# Patient Record
Sex: MALE | Race: BLACK OR AFRICAN AMERICAN | NOT HISPANIC OR LATINO | Employment: FULL TIME | URBAN - METROPOLITAN AREA
[De-identification: names, ages, dates, MRNs, and addresses within clinical notes are randomized per-mention and may not be internally consistent; named-entity substitution may affect disease eponyms.]

---

## 2017-04-11 ENCOUNTER — LAB VISIT (OUTPATIENT)
Dept: LAB | Facility: HOSPITAL | Age: 52
End: 2017-04-11
Attending: INTERNAL MEDICINE
Payer: COMMERCIAL

## 2017-04-11 ENCOUNTER — OFFICE VISIT (OUTPATIENT)
Dept: FAMILY MEDICINE | Facility: CLINIC | Age: 52
End: 2017-04-11
Payer: COMMERCIAL

## 2017-04-11 VITALS
DIASTOLIC BLOOD PRESSURE: 82 MMHG | SYSTOLIC BLOOD PRESSURE: 122 MMHG | HEART RATE: 76 BPM | HEIGHT: 74 IN | OXYGEN SATURATION: 98 % | BODY MASS INDEX: 29.42 KG/M2 | WEIGHT: 229.25 LBS | TEMPERATURE: 99 F

## 2017-04-11 DIAGNOSIS — M22.2X2 PATELLOFEMORAL PAIN SYNDROME OF LEFT KNEE: ICD-10-CM

## 2017-04-11 DIAGNOSIS — D64.9 ANEMIA, UNSPECIFIED TYPE: ICD-10-CM

## 2017-04-11 DIAGNOSIS — E78.5 HYPERLIPIDEMIA, UNSPECIFIED HYPERLIPIDEMIA TYPE: ICD-10-CM

## 2017-04-11 DIAGNOSIS — Z12.5 SCREENING FOR PROSTATE CANCER: ICD-10-CM

## 2017-04-11 DIAGNOSIS — Z00.00 ROUTINE MEDICAL EXAM: Primary | ICD-10-CM

## 2017-04-11 DIAGNOSIS — Z00.00 ROUTINE MEDICAL EXAM: ICD-10-CM

## 2017-04-11 DIAGNOSIS — Z86.010 HISTORY OF COLONIC POLYPS: ICD-10-CM

## 2017-04-11 PROBLEM — Z86.0100 HISTORY OF COLONIC POLYPS: Status: ACTIVE | Noted: 2017-04-11

## 2017-04-11 LAB
ALBUMIN SERPL BCP-MCNC: 4.1 G/DL
ALP SERPL-CCNC: 57 U/L
ALT SERPL W/O P-5'-P-CCNC: 16 U/L
ANION GAP SERPL CALC-SCNC: 10 MMOL/L
AST SERPL-CCNC: 26 U/L
BASOPHILS # BLD AUTO: 0.04 K/UL
BASOPHILS NFR BLD: 0.7 %
BILIRUB SERPL-MCNC: 0.8 MG/DL
BUN SERPL-MCNC: 10 MG/DL
CALCIUM SERPL-MCNC: 9.8 MG/DL
CHLORIDE SERPL-SCNC: 104 MMOL/L
CHOLEST/HDLC SERPL: 4.8 {RATIO}
CO2 SERPL-SCNC: 24 MMOL/L
COMPLEXED PSA SERPL-MCNC: 3.1 NG/ML
CREAT SERPL-MCNC: 1.3 MG/DL
DIFFERENTIAL METHOD: ABNORMAL
EOSINOPHIL # BLD AUTO: 0.1 K/UL
EOSINOPHIL NFR BLD: 2.4 %
ERYTHROCYTE [DISTWIDTH] IN BLOOD BY AUTOMATED COUNT: 17.9 %
EST. GFR  (AFRICAN AMERICAN): >60 ML/MIN/1.73 M^2
EST. GFR  (NON AFRICAN AMERICAN): >60 ML/MIN/1.73 M^2
FERRITIN SERPL-MCNC: 26 NG/ML
GLUCOSE SERPL-MCNC: 78 MG/DL
HCT VFR BLD AUTO: 42.9 %
HDL/CHOLESTEROL RATIO: 21.1 %
HDLC SERPL-MCNC: 228 MG/DL
HDLC SERPL-MCNC: 48 MG/DL
HGB BLD-MCNC: 13.6 G/DL
IRON SERPL-MCNC: 270 UG/DL
LDLC SERPL CALC-MCNC: 169 MG/DL
LYMPHOCYTES # BLD AUTO: 1.5 K/UL
LYMPHOCYTES NFR BLD: 26.5 %
MCH RBC QN AUTO: 25.1 PG
MCHC RBC AUTO-ENTMCNC: 31.7 %
MCV RBC AUTO: 79 FL
MONOCYTES # BLD AUTO: 0.4 K/UL
MONOCYTES NFR BLD: 7 %
NEUTROPHILS # BLD AUTO: 3.6 K/UL
NEUTROPHILS NFR BLD: 63.4 %
NONHDLC SERPL-MCNC: 180 MG/DL
PLATELET # BLD AUTO: 285 K/UL
PMV BLD AUTO: 10.9 FL
POTASSIUM SERPL-SCNC: 4.5 MMOL/L
PROT SERPL-MCNC: 7.8 G/DL
RBC # BLD AUTO: 5.41 M/UL
SATURATED IRON: 57 %
SODIUM SERPL-SCNC: 138 MMOL/L
TOTAL IRON BINDING CAPACITY: 477 UG/DL
TRANSFERRIN SERPL-MCNC: 322 MG/DL
TRIGL SERPL-MCNC: 55 MG/DL
TSH SERPL DL<=0.005 MIU/L-ACNC: 0.61 UIU/ML
WBC # BLD AUTO: 5.74 K/UL

## 2017-04-11 PROCEDURE — 80053 COMPREHEN METABOLIC PANEL: CPT

## 2017-04-11 PROCEDURE — 82728 ASSAY OF FERRITIN: CPT

## 2017-04-11 PROCEDURE — 84466 ASSAY OF TRANSFERRIN: CPT

## 2017-04-11 PROCEDURE — 36415 COLL VENOUS BLD VENIPUNCTURE: CPT | Mod: PO

## 2017-04-11 PROCEDURE — 85025 COMPLETE CBC W/AUTO DIFF WBC: CPT

## 2017-04-11 PROCEDURE — 84153 ASSAY OF PSA TOTAL: CPT

## 2017-04-11 PROCEDURE — 99999 PR PBB SHADOW E&M-EST. PATIENT-LVL III: CPT | Mod: PBBFAC,,, | Performed by: INTERNAL MEDICINE

## 2017-04-11 PROCEDURE — 80061 LIPID PANEL: CPT

## 2017-04-11 PROCEDURE — 3079F DIAST BP 80-89 MM HG: CPT | Mod: S$GLB,,, | Performed by: INTERNAL MEDICINE

## 2017-04-11 PROCEDURE — 84443 ASSAY THYROID STIM HORMONE: CPT

## 2017-04-11 PROCEDURE — 83540 ASSAY OF IRON: CPT

## 2017-04-11 PROCEDURE — 3074F SYST BP LT 130 MM HG: CPT | Mod: S$GLB,,, | Performed by: INTERNAL MEDICINE

## 2017-04-11 PROCEDURE — 99396 PREV VISIT EST AGE 40-64: CPT | Mod: S$GLB,,, | Performed by: INTERNAL MEDICINE

## 2017-04-11 NOTE — PROGRESS NOTES
Chief complaint,Physical exam       51-year-old black male here for his physical.  He is active.  He is watching his diet.  His cholesterol responded last year.  He had a colonoscopy showing polyps.  He was also slightly anemic and also was iron deficient when he tried to donate blood.  Last year he did donate 4 times but none this year and we discussed holding on donations until we figure this out.  No other bleeding.  Family history of prostate cancer we will continue yearly PSA checks.  He had 4 uncles with colon cancer    ROS:   CONST: weight stable. EYES: no vision change. ENT: no sore throat. CV: no chest pain w/ exertion. RESP: no shortness of breath. GI: no nausea, vomiting, diarrhea. No dysphagia. : no urinary issues. MUSCULOSKELETAL: no new myalgias or arthralgias. SKIN: no new changes. NEURO: no focal deficits. PSYCH: no new issues. ENDOCRINE: no polyuria. HEME: no lymph nodes. ALLERGY: no general pruritis.     PMH:   1.  in 2006, 200 in 2014  2. HTN  3. WANDA   4. 3 Polyps 3/15 --3-5 yrs   5.  Anemia, likely secondary to blood donations 4 times a year                                                                   PSH: dental/sinus surgery                                                                     SH:  Works as a  at [employer].  Does not smoke or drink.                                                                                         FMH: Father with prostate cancer at age 50.  Four uncles with colon cancer.  Mom with borderline DM.  One sister and four brothers with no problem.    Vitals as above  GGen: no distress  EYES: conjunctiva clear, non-icteric, PERRL  ENT: nose clear, nasal mucosa normal, oropharynx clear and moist, teeth good  NECK:supple, thyroid non-palpable  RESP: effort is good, lungs clear  CV: heart RRR w/o murmur, gallops or rubs; no carotid bruits, no edema  GI: abdomen soft, non-distended, non-tender, no hepatosplenomegaly  MS: gait normal, no clubbing  or cyanosis of the digits,   SKIN: no rashes, warm to touch   Chapo was seen today for annual exam.    Diagnoses and all orders for this visit:    Routine medical exam, continue his diet and exercise, up-to-date on colonoscopy, update PSA and other labs, reassess lipids and discussed the possibility may need medicine to control lipids in the future.  Hold on any blood donations until we assess resolution of the anemia  -     TSH; Future  -     Lipid panel; Future  -     CBC auto differential; Future  -     Comprehensive metabolic panel; Future  -     PSA, Screening; Future    Screening for prostate cancer  -     PSA, Screening; Future    Hyperlipidemia, unspecified hyperlipidemia type    History of colonic polyps    Patellofemoral pain syndrome of left knee    Anemia, unspecified type  -     Iron and TIBC; Future  -     Ferritin; Future

## 2018-04-11 ENCOUNTER — LAB VISIT (OUTPATIENT)
Dept: LAB | Facility: HOSPITAL | Age: 53
End: 2018-04-11
Attending: FAMILY MEDICINE
Payer: COMMERCIAL

## 2018-04-11 ENCOUNTER — OFFICE VISIT (OUTPATIENT)
Dept: FAMILY MEDICINE | Facility: CLINIC | Age: 53
End: 2018-04-11
Payer: COMMERCIAL

## 2018-04-11 VITALS
HEART RATE: 55 BPM | WEIGHT: 242.19 LBS | BODY MASS INDEX: 31.08 KG/M2 | TEMPERATURE: 98 F | HEIGHT: 74 IN | DIASTOLIC BLOOD PRESSURE: 88 MMHG | OXYGEN SATURATION: 99 % | SYSTOLIC BLOOD PRESSURE: 144 MMHG

## 2018-04-11 DIAGNOSIS — I10 HYPERTENSION, UNSPECIFIED TYPE: ICD-10-CM

## 2018-04-11 DIAGNOSIS — Z00.00 ROUTINE PHYSICAL EXAMINATION: Primary | ICD-10-CM

## 2018-04-11 DIAGNOSIS — Z00.00 ROUTINE PHYSICAL EXAMINATION: ICD-10-CM

## 2018-04-11 DIAGNOSIS — S61.412A LACERATION OF LEFT HAND WITHOUT FOREIGN BODY, INITIAL ENCOUNTER: ICD-10-CM

## 2018-04-11 DIAGNOSIS — Z23 NEED FOR TDAP VACCINATION: ICD-10-CM

## 2018-04-11 DIAGNOSIS — E78.5 HYPERLIPIDEMIA, UNSPECIFIED HYPERLIPIDEMIA TYPE: ICD-10-CM

## 2018-04-11 LAB
25(OH)D3+25(OH)D2 SERPL-MCNC: 15 NG/ML
ALBUMIN SERPL BCP-MCNC: 4.1 G/DL
ALP SERPL-CCNC: 52 U/L
ALT SERPL W/O P-5'-P-CCNC: 25 U/L
ANION GAP SERPL CALC-SCNC: 7 MMOL/L
AST SERPL-CCNC: 30 U/L
BASOPHILS # BLD AUTO: 0.04 K/UL
BASOPHILS NFR BLD: 0.8 %
BILIRUB SERPL-MCNC: 1 MG/DL
BUN SERPL-MCNC: 15 MG/DL
CALCIUM SERPL-MCNC: 9.9 MG/DL
CHLORIDE SERPL-SCNC: 101 MMOL/L
CHOLEST SERPL-MCNC: 309 MG/DL
CHOLEST/HDLC SERPL: 6.6 {RATIO}
CO2 SERPL-SCNC: 27 MMOL/L
COMPLEXED PSA SERPL-MCNC: 3.9 NG/ML
CREAT SERPL-MCNC: 1.1 MG/DL
DIFFERENTIAL METHOD: ABNORMAL
EOSINOPHIL # BLD AUTO: 0.1 K/UL
EOSINOPHIL NFR BLD: 2.4 %
ERYTHROCYTE [DISTWIDTH] IN BLOOD BY AUTOMATED COUNT: 14.3 %
EST. GFR  (AFRICAN AMERICAN): >60 ML/MIN/1.73 M^2
EST. GFR  (NON AFRICAN AMERICAN): >60 ML/MIN/1.73 M^2
ESTIMATED AVG GLUCOSE: 105 MG/DL
FERRITIN SERPL-MCNC: 90 NG/ML
GLUCOSE SERPL-MCNC: 79 MG/DL
HBA1C MFR BLD HPLC: 5.3 %
HCT VFR BLD AUTO: 49.2 %
HDLC SERPL-MCNC: 47 MG/DL
HDLC SERPL: 15.2 %
HGB BLD-MCNC: 15.7 G/DL
IMM GRANULOCYTES # BLD AUTO: 0.01 K/UL
IMM GRANULOCYTES NFR BLD AUTO: 0.2 %
IRON SERPL-MCNC: 135 UG/DL
LDLC SERPL CALC-MCNC: 248.4 MG/DL
LYMPHOCYTES # BLD AUTO: 1.1 K/UL
LYMPHOCYTES NFR BLD: 22.2 %
MCH RBC QN AUTO: 28.8 PG
MCHC RBC AUTO-ENTMCNC: 31.9 G/DL
MCV RBC AUTO: 90 FL
MONOCYTES # BLD AUTO: 0.5 K/UL
MONOCYTES NFR BLD: 10.4 %
NEUTROPHILS # BLD AUTO: 3.3 K/UL
NEUTROPHILS NFR BLD: 64 %
NONHDLC SERPL-MCNC: 262 MG/DL
NRBC BLD-RTO: 0 /100 WBC
PLATELET # BLD AUTO: 243 K/UL
PMV BLD AUTO: 10.3 FL
POTASSIUM SERPL-SCNC: 4.1 MMOL/L
PROT SERPL-MCNC: 7.4 G/DL
RBC # BLD AUTO: 5.46 M/UL
SATURATED IRON: 36 %
SODIUM SERPL-SCNC: 135 MMOL/L
T4 FREE SERPL-MCNC: 0.95 NG/DL
TOTAL IRON BINDING CAPACITY: 377 UG/DL
TRANSFERRIN SERPL-MCNC: 255 MG/DL
TRIGL SERPL-MCNC: 68 MG/DL
TSH SERPL DL<=0.005 MIU/L-ACNC: 0.71 UIU/ML
WBC # BLD AUTO: 5.09 K/UL

## 2018-04-11 PROCEDURE — 84443 ASSAY THYROID STIM HORMONE: CPT

## 2018-04-11 PROCEDURE — 82306 VITAMIN D 25 HYDROXY: CPT

## 2018-04-11 PROCEDURE — 82728 ASSAY OF FERRITIN: CPT

## 2018-04-11 PROCEDURE — 90715 TDAP VACCINE 7 YRS/> IM: CPT | Mod: S$GLB,,, | Performed by: FAMILY MEDICINE

## 2018-04-11 PROCEDURE — 84439 ASSAY OF FREE THYROXINE: CPT

## 2018-04-11 PROCEDURE — 83036 HEMOGLOBIN GLYCOSYLATED A1C: CPT

## 2018-04-11 PROCEDURE — 84153 ASSAY OF PSA TOTAL: CPT

## 2018-04-11 PROCEDURE — 36415 COLL VENOUS BLD VENIPUNCTURE: CPT | Mod: PO

## 2018-04-11 PROCEDURE — 99396 PREV VISIT EST AGE 40-64: CPT | Mod: 25,S$GLB,, | Performed by: FAMILY MEDICINE

## 2018-04-11 PROCEDURE — 85025 COMPLETE CBC W/AUTO DIFF WBC: CPT

## 2018-04-11 PROCEDURE — 90471 IMMUNIZATION ADMIN: CPT | Mod: S$GLB,,, | Performed by: FAMILY MEDICINE

## 2018-04-11 PROCEDURE — 80061 LIPID PANEL: CPT

## 2018-04-11 PROCEDURE — 99999 PR PBB SHADOW E&M-EST. PATIENT-LVL III: CPT | Mod: PBBFAC,,, | Performed by: FAMILY MEDICINE

## 2018-04-11 PROCEDURE — 83540 ASSAY OF IRON: CPT

## 2018-04-11 PROCEDURE — 80053 COMPREHEN METABOLIC PANEL: CPT

## 2018-04-11 NOTE — PROGRESS NOTES
Tdap vaccine administered, pierre well. Instructed to wait 15mins for observation, no reaction noted @ time of discharge.

## 2018-04-11 NOTE — PROGRESS NOTES
[facility]  Progress Note    SUBJECTIVE:     Chief Complaint   Patient presents with    Annual Exam       HPI   Chapo Ohara  is a 52 y.o. male here for annual exam. Doing well, been trying to diet better. Patient has no other new complaints/problems at this time.      Review of patient's allergies indicates:  No Known Allergies    Past Medical History:   Diagnosis Date    History of colonic polyps 4/11/2017    HTN (hypertension) 3/7/2014    Hyperlipidemia 3/7/2014    Sleep apnea 9/10/2012     Past Surgical History:   Procedure Laterality Date    COLONOSCOPY N/A 12/11/2015    Procedure: COLONOSCOPY;  Surgeon: Balta Farah MD;  Location: [facility];  Service: Endoscopy;  Laterality: N/A;     History reviewed. No pertinent family history.  Social History   Substance Use Topics    Smoking status: Never Smoker    Smokeless tobacco: Never Used    Alcohol use No        Review of Systems   Constitutional: Negative for chills, diaphoresis and fever.   HENT: Negative for congestion, ear pain and sore throat.    Eyes: Negative for photophobia and discharge.   Respiratory: Negative for cough, shortness of breath and wheezing.    Cardiovascular: Negative for chest pain and palpitations.   Gastrointestinal: Negative for abdominal pain, constipation, diarrhea, nausea and vomiting.   Genitourinary: Negative for dysuria and hematuria.   Musculoskeletal: Negative for back pain and myalgias.   Skin: Negative for itching and rash.   Neurological: Negative for dizziness, sensory change, focal weakness, weakness and headaches.   All other systems reviewed and are negative.    OBJECTIVE:     Vitals:    04/11/18 0804   BP: (!) 144/88   Pulse: (!) 55   Temp: 97.9 °F (36.6 °C)     Body mass index is 31.09 kg/m².    Physical Exam   Constitutional: He is oriented to person, place, and time and well-developed, well-nourished, and in no distress. No distress.   HENT:   Head: Normocephalic and atraumatic.   Right Ear: Tympanic  membrane is not perforated, not erythematous and not bulging. No hemotympanum.   Left Ear: Tympanic membrane is not perforated, not erythematous and not bulging. No hemotympanum.   Mouth/Throat: Oropharynx is clear and moist. No oropharyngeal exudate.   Eyes: Conjunctivae and EOM are normal. Pupils are equal, round, and reactive to light.   Neck: No thyromegaly present.   Cardiovascular: Normal rate, regular rhythm and normal heart sounds.  Exam reveals no gallop and no friction rub.    No murmur heard.  Pulmonary/Chest: Effort normal and breath sounds normal. No respiratory distress. He has no wheezes. He has no rales.   Abdominal: Soft. Bowel sounds are normal. He exhibits no distension. There is no tenderness. There is no rebound and no guarding.   Musculoskeletal: Normal range of motion. He exhibits no edema or tenderness.   Lymphadenopathy:     He has no cervical adenopathy.   Neurological: He is alert and oriented to person, place, and time.   Skin: Skin is warm. No rash noted. He is not diaphoretic. No erythema.   Well healing laceration on left hand. No evidence of infection, redness, or discharge.       Old records were reviewed. Labs and/or images were independently reviewed.    ASSESSMENT     1. Routine physical examination    2. Hypertension, unspecified type    3. Hyperlipidemia, unspecified hyperlipidemia type    4. Need for Tdap vaccination        PLAN:     Routine physical examination  -     CBC auto differential; Future  -     Comprehensive metabolic panel; Future  -     Hemoglobin A1c; Future  -     Lipid panel; Future  -     TSH; Future  -     T4, free; Future  -     PSA, Screening; Future; Expected date: 04/11/2018  -     Iron and TIBC; Future; Expected date: 04/11/2018  -     Ferritin; Future; Expected date: 04/11/2018  -     Vitamin D; Future; Expected date: 04/11/2018  -     We briefly discussed diet, exercise, and routine preventive exams. All questions and comments  addressed.    Hypertension, unspecified type   -     Educated patient to take medications as prescribed, and to record bp logs daily to bring along to next visit. Instructed patient to decrease salt intake. Also told patient to call if any new signs or symptoms develop.    Hyperlipidemia, unspecified hyperlipidemia type   -     Counseled patient about healthy diet, exercise habits, and to increase physical activity.    Need for Tdap vaccination/Laceration of left hand  -     Tdap Vaccine      RTC PRANICETO Damon MD  04/11/2018 8:21 AM

## 2018-08-07 ENCOUNTER — OFFICE VISIT (OUTPATIENT)
Dept: FAMILY MEDICINE | Facility: CLINIC | Age: 53
End: 2018-08-07
Payer: COMMERCIAL

## 2018-08-07 VITALS
WEIGHT: 244.94 LBS | BODY MASS INDEX: 31.43 KG/M2 | HEIGHT: 74 IN | OXYGEN SATURATION: 97 % | HEART RATE: 56 BPM | DIASTOLIC BLOOD PRESSURE: 100 MMHG | TEMPERATURE: 98 F | SYSTOLIC BLOOD PRESSURE: 140 MMHG

## 2018-08-07 DIAGNOSIS — L72.3 SEBACEOUS CYST: Primary | ICD-10-CM

## 2018-08-07 DIAGNOSIS — L91.0 KELOID: ICD-10-CM

## 2018-08-07 DIAGNOSIS — H61.20 WAX IN EAR: ICD-10-CM

## 2018-08-07 PROCEDURE — 99214 OFFICE O/P EST MOD 30 MIN: CPT | Mod: S$GLB,,, | Performed by: NURSE PRACTITIONER

## 2018-08-07 PROCEDURE — 99999 PR PBB SHADOW E&M-EST. PATIENT-LVL IV: CPT | Mod: PBBFAC,,, | Performed by: NURSE PRACTITIONER

## 2018-08-07 NOTE — PROGRESS NOTES
This dictation has been generated using Dragon Dictation some phonetic errors may occur.     Problem List Items Addressed This Visit     None      Visit Diagnoses     Sebaceous cyst    -  Primary    Relevant Orders    Ambulatory referral to Dermatology    Keloid        Relevant Orders    Ambulatory referral to Dermatology    Wax in ear            Orders Placed This Encounter    Ambulatory referral to Dermatology     Sebaceous cyst of the scalp.  Follow-up with Dermatology.  Keloid formation on the chest follow-up with Dermatology.  Use some hydrocortisone for the itching.  Wax in ear recommended Debrox.    Follow-up if symptoms worsen or fail to improve.    ________________________________________________________________  ________________________________________________________________      Chief Complaint   Patient presents with    cyst on head    spot on chest    ears itching     History of present illness  This 53 y.o. presents today for complaint of cyst on the back and head, spot on the chest, ears itching.  Patient notes a cyst on the back of the head.  It has not been present for very long.  He came up suddenly.  Does not seem to be inflamed or infected.  Patient notes a spot on his chest.  He has keloid formation on his chest wall.  He notes some itching.  Denies any trauma.  Patient notes itching in the ears.  He previously had some ringing.  He denies any pain.  Denies any drainage from the ear.  Does not have any sinus problems.  He does not swim.  He does job.  He works as a .  Complete review of systems otherwise negative    Past Medical History:   Diagnosis Date    History of colonic polyps 4/11/2017    HTN (hypertension) 3/7/2014    Hyperlipidemia 3/7/2014    Sleep apnea 9/10/2012       Past Surgical History:   Procedure Laterality Date    COLONOSCOPY N/A 12/11/2015    Procedure: COLONOSCOPY;  Surgeon: Balta Farah MD;  Location: [facility];  Service: Endoscopy;  Laterality: N/A;        History reviewed. No pertinent family history.    Social History     Social History    Marital status:      Spouse name: N/A    Number of children: N/A    Years of education: N/A     Occupational History     [employer]     Social History Main Topics    Smoking status: Never Smoker    Smokeless tobacco: Never Used    Alcohol use No    Drug use: No    Sexual activity: Yes     Partners: Female     Other Topics Concern    None     Social History Narrative    None       No current outpatient prescriptions on file.     No current facility-administered medications for this visit.        Review of patient's allergies indicates:  No Known Allergies    Physical examination  Vitals Reviewed  Gen. Well-dressed well-nourished   Skin warm dry and intact.  No rashes noted. Cyst on the back of the skull to the right side of the external occipital protuberance.  Keloid noted on the chest.  HEENT.  TM intact bilateral with normal light reflex on the left.  Some wax noted.  No mastoid tenderness during percussion.  Nares patent bilateral.  Pharynx is unremarkable.  No maxillary or frontal sinus tenderness when percussed.    Neck is supple without adenopathy.  No posterior cervical adenopathy.  Chest.  Respirations are even unlabored.  Lungs are clear to auscultation.  Cardiac regular rate and rhythm.  No chest wall adenopathy noted.  Neuro. Awake alert oriented x4.  Normal judgment and cognition noted.  Extremities no clubbing cyanosis or edema noted.     Call or return to clinic prn if these symptoms worsen or fail to improve as anticipated.

## 2018-08-08 ENCOUNTER — TELEPHONE (OUTPATIENT)
Dept: FAMILY MEDICINE | Facility: CLINIC | Age: 53
End: 2018-08-08

## 2018-09-18 ENCOUNTER — TELEPHONE (OUTPATIENT)
Dept: FAMILY MEDICINE | Facility: CLINIC | Age: 53
End: 2018-09-18

## 2018-09-18 DIAGNOSIS — Z86.010 HISTORY OF COLONIC POLYPS: Primary | ICD-10-CM

## 2018-09-18 NOTE — TELEPHONE ENCOUNTER
----- Message from Ching Reynoso sent at 9/18/2018  1:17 PM CDT -----  Contact: self   Please call pt back at [phone] he states he received a letter from [facility] stating it is time for him to have a Colonoscopy. He needs a referral from Dr Torres . Please call pt back to let him know this is done.

## 2018-10-09 ENCOUNTER — TELEPHONE (OUTPATIENT)
Dept: PLASTIC SURGERY | Facility: CLINIC | Age: 53
End: 2018-10-09

## 2018-10-09 NOTE — TELEPHONE ENCOUNTER
----- Message from Bernarda John LPN sent at 10/9/2018  3:30 PM CDT -----  Contact: Pt      ----- Message -----  From: Doug Stephens  Sent: 10/9/2018   1:49 PM  To: [facility] Clinical Support    Pt would like to be called back regarding having a soft tumor in the back of the head and needing Liposuction Surgery. Pt is looking for a specific Surgeon for this procedure.      Pt can be reached at [phone].    Thank You.

## 2018-10-15 ENCOUNTER — OFFICE VISIT (OUTPATIENT)
Dept: PLASTIC SURGERY | Facility: CLINIC | Age: 53
End: 2018-10-15
Payer: COMMERCIAL

## 2018-10-15 VITALS
HEART RATE: 71 BPM | TEMPERATURE: 99 F | HEIGHT: 74 IN | DIASTOLIC BLOOD PRESSURE: 96 MMHG | RESPIRATION RATE: 18 BRPM | WEIGHT: 243.19 LBS | BODY MASS INDEX: 31.21 KG/M2 | SYSTOLIC BLOOD PRESSURE: 148 MMHG

## 2018-10-15 DIAGNOSIS — R22.0 SCALP MASS: Primary | ICD-10-CM

## 2018-10-15 PROCEDURE — 99999 PR PBB SHADOW E&M-EST. PATIENT-LVL III: CPT | Mod: PBBFAC,,, | Performed by: SURGERY

## 2018-10-15 PROCEDURE — 99203 OFFICE O/P NEW LOW 30 MIN: CPT | Mod: S$GLB,,, | Performed by: SURGERY

## 2018-10-16 ENCOUNTER — PROCEDURE VISIT (OUTPATIENT)
Dept: PLASTIC SURGERY | Facility: CLINIC | Age: 53
End: 2018-10-16
Payer: COMMERCIAL

## 2018-10-16 VITALS
DIASTOLIC BLOOD PRESSURE: 110 MMHG | SYSTOLIC BLOOD PRESSURE: 169 MMHG | WEIGHT: 244.25 LBS | HEIGHT: 74 IN | BODY MASS INDEX: 31.35 KG/M2 | HEART RATE: 68 BPM

## 2018-10-16 DIAGNOSIS — R22.0 MASS OF SCALP: Primary | ICD-10-CM

## 2018-10-16 PROCEDURE — 13121 CMPLX RPR S/A/L 2.6-7.5 CM: CPT | Mod: 51,S$GLB,, | Performed by: SURGERY

## 2018-10-16 PROCEDURE — 21012 EXC FACE LES SBQ 2 CM/>: CPT | Mod: S$GLB,,, | Performed by: SURGERY

## 2018-10-16 PROCEDURE — 88304 TISSUE EXAM BY PATHOLOGIST: CPT | Mod: 26,,, | Performed by: PATHOLOGY

## 2018-10-16 PROCEDURE — 13122 CMPLX RPR S/A/L ADDL 5 CM/>: CPT | Mod: S$GLB,,, | Performed by: SURGERY

## 2018-10-16 PROCEDURE — 88304 TISSUE EXAM BY PATHOLOGIST: CPT | Performed by: PATHOLOGY

## 2018-10-16 RX ORDER — OXYCODONE HYDROCHLORIDE 5 MG/1
5 TABLET ORAL EVERY 8 HOURS PRN
Qty: 5 TABLET | Refills: 0 | Status: SHIPPED | OUTPATIENT
Start: 2018-10-16 | End: 2018-10-16

## 2018-10-16 RX ORDER — OXYCODONE HYDROCHLORIDE 5 MG/1
5 TABLET ORAL EVERY 8 HOURS PRN
Qty: 5 TABLET | Refills: 0 | Status: SHIPPED | OUTPATIENT
Start: 2018-10-16 | End: 2018-10-29 | Stop reason: ALTCHOICE

## 2018-10-16 NOTE — PATIENT INSTRUCTIONS
[facility]  c/o Tobi Jackman M.D.  [facility]  [address]nd floor [address]  [address] [place] [address]      Wound Care After Minor Procedure  You can remove the dressing placed in the procedure room after 24 hours to shower.  You may use shampoo at the scalp surgery site starting Post Op Day 3.  Avoid scrubbing the incision with shampoo until healed.  Pat the incisions dry.  Place thin layer of bacitracin to incision twice daily.  No tub soaking of incisions.  No lotions or creams on the incision otherwise.      You may feel some discomfort after the procedure.  You may experience some discomfort once the local anesthesia wears off in a few hours.  Take Tylenol for pain.  Avoid non-steroidal anti-inflammatory medications such as ibuprofen or Naprosyn for post-operative pain because they are associated with an increased risk of bleeding.  Take it only as directed and do not drive while taking this medication.      You may feel or notice bunching around the incision for a few weeks.  This should flatten over time.  These are from internal sutures used to prevent dead space fluid accumulation.    Avoid direct sun exposure whenever possible.  After you are cleared at your follow up visit, you may apply sun block around the incision to prevent it from becoming darkened with direct sun exposure.  Wear a hat or other form of sun blocking garment as often as possible to prevent direct sun light.      After you are cleared at your follow up visit, you may also be instructed to perform scar massage to allow for flattening of your incision.      You should avoid lifting anything heavier than a milk jug at least 3 weeks post-procedure if possible.  This will allow the wound adequate time to heal.  The longer you can avoid strenuous activity, the more likely you will be to have a well healed incision at your surgical site.  The tensile strength of your incision is maximum at 4-6  months post operatively.      It is best if you can sleep with your head of bed elevated.  Use cool compresses 10 minutes on, 10 minutes off to the surgery site to relieve post-operative swelling.  Avoid direct contact with your skin to avoid a cold burn.  Make sure to use a barrier like a piece of gauze or bandaid.  You may apply ice water-soaked gauze to your eyelids.  You can also put a cool pack over the dressing on your forehead.      Sleeping with your head of bed elevated above 45 degrees may help dependent edema from setting in during the first week after your procedure.    Follow up in 2-3 weeks for a wound check and to review the pathology report, or sooner if problems arise.    #[phone]    Feel free to call the office with any additional questions or concerns.

## 2018-10-16 NOTE — PROCEDURES
[facility] OFFICE PROCEDURE     Name: Chapo Ohara  MRN: [id number]  Date: 10/19/18    PRE-OP DIAGNOSIS:  Encounter Diagnoses   Name Primary?    Mass of scalp Yes       POST-OP DIAGNOSIS:  Same    PROCEDURES:  EXCISION, Occipital Scalp Mass 7 cm wide  Complex Closure 8 cm scalp wound    ANESTHESIA:  Lidocaine 1% with epinephrine 1:100,000 25 ml    FINDINGS: Adipose mass excised  SURGEON: Gasper Jackman MD  EBL: minimal  COMPLICATIONS: none  SPECIMENS: Occipital scalp mass as noted above  DISPOSITION: good condition, discharged to home.    The patient tolerated the procedure without adverse consequences    INDICATIONS:  The risks, benefits, alternatives, and expected outcomes were discussed with the patient's parents; the risks including but not limited to poor scarring, wound healing problem, infection, bleeding, keloid, hypertrophic scarring, incomplete removal, recurrence of mass, sensory nerve injury, discovery of unanticipated pathology. Informed consent was obtained.  All questions were answered.      PROCEDURE:  The patient was brought to the procedure room and placed in a supine position.  Time out and verification procedure were performed. 25 cc 1% lidocaine with 1:200,000 epinephrine solution was injected in a field block fashion.  After 15 minutes of transfer to procedure room and transfer to the procedure table, an 8 cm incision was marked transversely across the palpable middle point of the scalp. The patient was prepped and draped in sterile fashion. The mass was taken off under direct visualization with a combination of sharp and bovie dissection.  There was not a well defined capsul of this mass.  It went down to intact calvarium.  Hemostasis was achieved. The dimensions of the mass were listed above.  Conservative ndermining above was performed above the level of the calvarium in each direction, leaving soft tissue down to facilitate wound closure.The wound was irrigated copiously.  Complex closure of 8 cm was  performed with 2-0 vicryl quilting suture between the rishabh and the fascia over the calvarium, followed by deep dermal 3-0 monocryl buried and then running 5-0 chromic in the skin with good approximation. A dressing of bacitracin, abd pad, 3M medipore tape was placed.  The patient tolerating the procedure well without complication.    AFTERCARE  See Patient Instructions.    All of his questions were answered.    Can return to the clinic in 2-3 weeks for wound check.

## 2018-10-19 NOTE — PROGRESS NOTES
PLASTIC & RECONSTRUCTIVE CONSULTATION NOTE    CC  Chief Complaint   Patient presents with    Consult   Scalp mass    Referring Provider- Ferny Kelley NP Dermatology  PCP: Prashanth Torres MD    HPI  History from patient, chart, referring provider  Chapo Ohara is a 53 y.o. male presenting with scalp mass for the last 2 months.  He states that over this interval he has had an increase in the size of this mass, to the point that it has doubled in size.  It does not cause pain or discomfort but he is concerned about the appearance.  One of the providers who referred him states that it could be amenable to liposcution, and he is inquiring about that today.  He saw Brian for a primary care visit earlier this year.  His medications have not changed.  He is , does no smoke and does not have diabetes.  He has no known history of bleeding disorder.  He states that he has not had a cyst before.      With regard to wound healing, he has had thickening of a scar on his chest.  He has noticed that it is has become wide.  It itches and dermatology has recommended hydrocortisone cream to this area.  He has other scratches which are not thick and do not show evidence of keloiding.      He has a past medical history of HTN.  He is not currently taking medications.  Shawn Damon saw him and treated him for HTN.           [facility]  Patient Active Problem List    Diagnosis Date Noted    History of colonic polyps 04/11/2017    Urinary frequency 11/21/2014    Cardiac murmur 11/21/2014    Hyperlipidemia 03/07/2014    HTN (hypertension) 03/07/2014    Sleep apnea 09/10/2012   Reviewed  He does not check his BP at home    PSH  Past Surgical History:   Procedure Laterality Date    COLONOSCOPY N/A 12/11/2015    Procedure: COLONOSCOPY;  Surgeon: Balta Farah MD;  Location: [facility];  Service: Endoscopy;  Laterality: N/A;    COLONOSCOPY N/A 12/11/2015    Performed by Balta Farah MD at [facility]   As noted above.  No other  "surgeries    FH  No family history on file.  Not contributory    MEDICATIONS  No outpatient medications have been marked as taking for the 10/15/18 encounter (Office Visit) with Tobi Jackman MD.   His medications are not loaded currently.      ALLERGIES Review of patient's allergies indicates:  No Known Allergies    SOCIAL HISTORY  Tobacco:   Social History     Tobacco Use   Smoking Status Never Smoker   Smokeless Tobacco Never Used     EtOH:   Social History     Substance and Sexual Activity   Alcohol Use No   Does not use tobacco    ROS  Pertinent 12 point ROS negative except as listed above.  She denies history of weight change, fatigue, eye problems, ear problems, hoarseness/voice change, chest discomfort, palpitations, vein inflammation, swollen feet, breathing problems, chronic cough, indigestion, trouble swallowing, abdominal pain, blood in stool, urinary problems, prostate problems, sexual problems, joint problems, back pain, musculoskeletal pain, skin problems, dizziness, headaches, muscle weakness, trouble sleeping, abnormal bruising, abnormal thirst, abnormal sweating, depression, anxiety, or any prior psychiatry consultation.      PHYSICAL EXAM  BP (!) 148/96   Pulse 71   Temp 98.8 °F (37.1 °C)   Resp 18   Ht 6' 2" (1.88 m)   Wt 110.3 kg (243 lb 2.7 oz)   BMI 31.22 kg/m²     Vitals:    10/15/18 1102   BP: (!) 148/96   Pulse: 71   Resp: 18   Temp: 98.8 °F (37.1 °C)   Weight: 110.3 kg (243 lb 2.7 oz)   Height: 6' 2" (1.88 m)       Height:   Ht Readings from Last 1 Encounters:   10/16/18 6' 2" (1.88 m)       Weight:   Wt Readings from Last 1 Encounters:   10/16/18 110.8 kg (244 lb 4.3 oz)       Body mass index is 31.22 kg/m².    Gen: Physical examination reveals a well developed, well nourished male of good mood who is alert and is oriented to person, place, time.    HEENT: Head is normocephalic and atraumatic. Extraocular motion is intact. Mucosal membranes moist.  Facial nerve symmetric.  No " obvious paresthesias in the face.      Pulm: Breathing is non-labored, normal respiratory effort    CV: Palpable peripheral pulses.      Extremities: No cyanosis or edema    Musculoskeletal: Normal gate and stance    Skin: Normal turgor    GI: Abdomen Soft, Non-tender, Non-distended. No other lipodystrophy    Skin quality: Darker skin.  No keloiding noted.  Laceration on his chest and in other locations is not excessively raised.  Scars: Yes- chest and other locations    7 cm mobile mass with ill defined borders over the occiput of his scalp.  There is no punctum and it is compressible.  It does not transilluminate  There is a skin fold just caudal to the mass.        LABS  Lab Results   Component Value Date    WBC 5.09 04/11/2018    HGB 15.7 04/11/2018    HCT 49.2 04/11/2018    MCV 90 04/11/2018     04/11/2018     Lab Results   Component Value Date     (L) 04/11/2018    K 4.1 04/11/2018     04/11/2018    CO2 27 04/11/2018     Lab Results   Component Value Date    ALBUMIN 4.1 04/11/2018     Lab Results   Component Value Date    CREATININE 1.1 04/11/2018     Lab Results   Component Value Date    HGBA1C 5.3 04/11/2018       Component      Latest Ref Rng & Units 4/11/2018   WBC      3.90 - 12.70 K/uL 5.09   RBC      4.60 - 6.20 M/uL 5.46   Hemoglobin      14.0 - 18.0 g/dL 15.7   Hematocrit      40.0 - 54.0 % 49.2   MCV      82 - 98 fL 90   MCH      27.0 - 31.0 pg 28.8   MCHC      32.0 - 36.0 g/dL 31.9 (L)   RDW      11.5 - 14.5 % 14.3   Platelets      150 - 350 K/uL 243   MPV      9.2 - 12.9 fL 10.3   Immature Granulocytes      0.0 - 0.5 % 0.2   Gran # (ANC)      1.8 - 7.7 K/uL 3.3   Immature Grans (Abs)      0.00 - 0.04 K/uL 0.01   Lymph #      1.0 - 4.8 K/uL 1.1   Mono #      0.3 - 1.0 K/uL 0.5   Eos #      0.0 - 0.5 K/uL 0.1   Baso #      0.00 - 0.20 K/uL 0.04   nRBC      0 /100 WBC 0   Gran%      38.0 - 73.0 % 64.0   Lymph%      18.0 - 48.0 % 22.2   Mono%      4.0 - 15.0 % 10.4   Eosinophil%       0.0 - 8.0 % 2.4   Basophil%      0.0 - 1.9 % 0.8   Differential Method       Automated   Sodium      136 - 145 mmol/L 135 (L)   Potassium      3.5 - 5.1 mmol/L 4.1   Chloride      95 - 110 mmol/L 101   CO2      23 - 29 mmol/L 27   Glucose      70 - 110 mg/dL 79   BUN, Bld      6 - 20 mg/dL 15   Creatinine      0.5 - 1.4 mg/dL 1.1   Calcium      8.7 - 10.5 mg/dL 9.9   Total Protein      6.0 - 8.4 g/dL 7.4   Albumin      3.5 - 5.2 g/dL 4.1   Total Bilirubin      0.1 - 1.0 mg/dL 1.0   Alkaline Phosphatase      55 - 135 U/L 52 (L)   AST      10 - 40 U/L 30   ALT      10 - 44 U/L 25   Anion Gap      8 - 16 mmol/L 7 (L)   eGFR if African American      >60 mL/min/1.73 m:2 >60.0   eGFR if non African American      >60 mL/min/1.73 m:2 >60.0   Cholesterol      120 - 199 mg/dL 309 (H)   Triglycerides      30 - 150 mg/dL 68   HDL      40 - 75 mg/dL 47   LDL Cholesterol      63.0 - 159.0 mg/dL 248.4 (H)   HDL/Chol Ratio      20.0 - 50.0 % 15.2 (L)   Total Cholesterol/HDL Ratio      2.0 - 5.0 6.6 (H)   Non-HDL Cholesterol      mg/dL 262   Iron      45 - 160 ug/dL 135   Transferrin      200 - 375 mg/dL 255   TIBC      250 - 450 ug/dL 377   Saturated Iron      20 - 50 % 36   Hemoglobin A1C      4.0 - 5.6 % 5.3   Estimated Avg Glucose      68 - 131 mg/dL 105   TSH      0.400 - 4.000 uIU/mL 0.709   Free T4      0.71 - 1.51 ng/dL 0.95   PSA, SCREEN      0.00 - 4.00 ng/mL 3.9   Ferritin      20.0 - 300.0 ng/mL 90   Vit D, 25-Hydroxy      30 - 96 ng/mL 15 (L)     I personally reviewed the above labs.      ASSESSMENT  No diagnosis found.  54 yo M with   1.  HTN   2.  Probable lipoma of occipital region of his scalp.  Mass of history with recent rapid growth.      Not a good candidate for liposuction, given the need to perform a tissue diagnosis.    ?  INFORMED CONSENT FOR SURGERY  Risks, benefits, outcomes, possible complications, perioperative restrictions, recovery, and potential disability were reviewed. Permission to obtain  photographs before, during, and after surgery was also granted. Questions were answered. Written preoperative instructions and potential complications were given.  Other risks include bleeding, infection, altered sensation in operative sites, hematoma, hypertrophic scarring, seroma, wound dehiscence, delayed wound healing, discovery of unanticipated pathology, need to abort the procedure if there are unexpected abnormal findings, recurrence of mass, need for future surgeries.  We discussed the possibility of having this mass be a seroma.  I also discussed imaging to rule out association with any deeper anatomy.  I explained that I could feel the limits of the mass and it was soft and given the history, my decision was that it was unlikely to be of bony origin and it was not erosive in any way.  I explained that it is likely a lipoma but that these masses in the head and neck can have ill defined borders and because of that may be prone to recurrence.  He can also potentially have alopecia, altered sensation in his scalp, hypertrophic scarring or keloiding in this particular location.         PLAN  Will defer imaging at this time given the clear exam of the association of the mass with the surrounding soft tissues.   Book for procedure room excisional biopsy.  Written consent obtained.  All of his questions were answered  Explained to him the importance of ongoing management of his hypertension.  He can follow up with his primary care doctor for further management.  He is at slightly higher risk of bleeding event with this procedure given that his blood pressure is controlled.  I do not disagree with proceeding more urgently with excisional biopsy given the rapid growth of the lesion

## 2018-10-29 ENCOUNTER — OFFICE VISIT (OUTPATIENT)
Dept: PLASTIC SURGERY | Facility: CLINIC | Age: 53
End: 2018-10-29
Payer: COMMERCIAL

## 2018-10-29 VITALS
DIASTOLIC BLOOD PRESSURE: 115 MMHG | HEIGHT: 74 IN | BODY MASS INDEX: 31.51 KG/M2 | SYSTOLIC BLOOD PRESSURE: 168 MMHG | HEART RATE: 65 BPM | WEIGHT: 245.56 LBS

## 2018-10-29 DIAGNOSIS — D17.0 LIPOMA OF SCALP: Primary | ICD-10-CM

## 2018-10-29 PROCEDURE — 99024 POSTOP FOLLOW-UP VISIT: CPT | Mod: S$GLB,,, | Performed by: SURGERY

## 2018-10-29 PROCEDURE — 99999 PR PBB SHADOW E&M-EST. PATIENT-LVL III: CPT | Mod: PBBFAC,,, | Performed by: SURGERY

## 2018-10-29 NOTE — PROGRESS NOTES
Plastic & Reconstructive Surgery Progress Note    Chapo Ohara  53 y.o.    Hospital Day: 0  Post Op Day: 13    Subjective  53 y.o.yo M s/p biopsy of occipital subcutaneous mass.  Has noticed some drainage over the past week.  Non malodorous, no fevers, minimal to no pain.  He has been dressing with a large bandaid and has been wearing a clean skull cap over bandaid intermittently.      Objective  No fluid collection  No evidence of infection  Ballotable area under scalp  Incision in tact except for superficial scalp separation 2 mm over midline portion of incision    Pathology report reviewed- lipoma    Assessment/Plan  53 year old male 2 weeks s/p excisional biopsy of lipoma.  Healing well except for some superficial skin separation and question of seroma.  No evidence of infection.  Also has small seroma, not retained.    - Verbal consent obtained for incision and drainage.  I explained that any retained fluid could impede healing.  Prepped skin with betadine, used 11 blade to make 1mm incision through area of separation.  Expressed non purulent non malodorous fluid from wound.  Culture not indicated at this time.  After aspirate did not yield fluid collection, used 18 blade to aspirate 5 cc of serosanguinous fluid from wound.  Dressed with non stick barrier, folded gauze, abd pad, kerlix.  He placed his skull cap over the wound.  He tolerated this well  - I will see him again in 2 weeks  - Instructions in AVS  - I will see him again in 2 weeks or sooner if clinical condition changes  - Advised to seek care (can call us) if he experiences new pain, change in the quality of the drainage (odor), warmth or spreading redness from the incision.      Plastic & Reconstructive Surgery  Microsurgery  [facility]  c/o Tobi Jackman M.D.  [facility]  [address]  [address] [place] [address]    Work [phone]  Toll free [phone]  If no answer  [phone]

## 2018-10-29 NOTE — PATIENT INSTRUCTIONS
AFTER VISIT INSTRUCTIONS    Name: Chapo Ohara  Medical Record Number: [id number]  Allergies: Review of patient's allergies indicates:  No Known Allergies    FOLLOW-UP:  Dr. Jackman will see you again in 2 weeks.        CONTACT NUMBERS:    [facility]  [address] [place] [address] [phone]    Plastic & Reconstructive Surgery  Microsurgery  [facility]  c/o oTbi Jackman M.D.  [facility]  [address]  [address] [facility] [address]  Work [phone]  Toll free [phone]  If no answer [phone]    To schedule appointment:  For all life-threatening emergencies, please call [phone]  For all other concerns regarding your plastic surgery, you may call the office at: [phone], toll free [phone].  If there is no answer at these numbers, you may call [phone].    BATHING  No tub soaking, lotions of creams to surgical sites until cleared by your doctor.  No scrubbing or soaking of incisions.  Pat wounds dry.  Can shower in between dressing changes.      WOUND CARE   I advised him to continue external compression and daily wound care.  Antibiotics are not indicated at this time.  He can expect some drainage over the next few days but the external compression should allow this cavity to collapse if performed routinely.      SUTURES  Not applicable    ACTIVITY  Encourage po intake  You may resume light activity (walking, usual activities around the home).  Avoid heavy lifting, running, swimming, strenuous activity for an additional 3 weeks.    Avoid long-distance travel for at least 3 weeks.  If you have long car rides, hydrate yourself well.  You can wear compressive stockings to avoid the blood in your legs from sitting still.  Perform ankle circles while awake to prevent stasis in your legs.      MEDICATIONS  rosie pain medication as needed:  Tylenol (acetominophen) 650 mg every 6 hours is recommended for mild pain.  DO NOT  exceed 4 grams of Tylenol in a 24 h period  Continue your usual medications and vitamins     SCAR MANAGEMENT  Scars may take over 1 year to mature.  Some scars will remain pink, dark purple, and possibly raised for 6-9 months after surgery.  After one year, scars often become flatter, smoother, and may change color.  Avoid the sun and cover the incision whenever possible.  After removal of the tegaderm/tape, suture removal, or when glue was used, apply a thin layer of Aquaphor (available at any drugstore) or antibiotic ointment to the scars for another 2 weeks.    Begin silicone when scars smooth, generally starting about 6 weeks after surgery, when the wounds are healed.  Please discuss this with Dr. Jackman before proceeding.  Medical grade silicone gel is available on companies' web sites or on amazon.com  Brands recommended:  - Scarfade (scarfade.com)  - NeuGel ( )  - Kelocote (kelocote.com)    THINGS TO WATCH FOR:  If you notice new pain, redness, abnormal drainage, abnormal fluid collection, fever, wound separation please notify Dr. Jackman's office immediately.  If there are issues with your surgical sites, we would rather know about them early, so that an appropriate plan of action can be followed.  If notified in a timely manner, this kind of post op care should be coordinated by Dr. Jackman rather than a surgeon or emergency person you are not familiar with.

## 2018-11-06 ENCOUNTER — OFFICE VISIT (OUTPATIENT)
Dept: PLASTIC SURGERY | Facility: CLINIC | Age: 53
End: 2018-11-06
Payer: COMMERCIAL

## 2018-11-06 VITALS
DIASTOLIC BLOOD PRESSURE: 80 MMHG | HEART RATE: 78 BPM | SYSTOLIC BLOOD PRESSURE: 120 MMHG | BODY MASS INDEX: 31.45 KG/M2 | HEIGHT: 74 IN | WEIGHT: 245.06 LBS

## 2018-11-06 DIAGNOSIS — D17.0 LIPOMA OF SCALP: Primary | ICD-10-CM

## 2018-11-06 PROCEDURE — 99999 PR PBB SHADOW E&M-EST. PATIENT-LVL II: CPT | Mod: PBBFAC,,, | Performed by: SURGERY

## 2018-11-06 PROCEDURE — 99024 POSTOP FOLLOW-UP VISIT: CPT | Mod: S$GLB,,, | Performed by: SURGERY

## 2018-11-12 NOTE — PROGRESS NOTES
"Plastic & Reconstructive Surgery Progress Note    Chapo Ohara  53 y.o.    Post Op Day: 10/16    Subjective  53 y.o.yo M s/p biopsy of occipital subcutaneous mass 10/19.    He is doing rinses, changing band-aids and wearing skull caps for compression.  Using medical tape to secure it.     Says he feels "sloshing" in his scalp and he has to lean to one side to have the fluid drained.       Objective  Incision draining serous fluid  Wound dehiscence noted, 2 mm over left lateral aspect and a separate 3 mm width over central wound  He says he is showering  No cellulitis, malodor or foul smelling discharge    Labs  None peritnent    Assessment/Plan  53 y.o. Female who had excisional biopsy of scalp lipoma.  With seroma post operatively and delayed healing of incision  - Packing will help him from having retained fluid.  Provided him with 1 inch packing strips  - He can pack the wound (taking care not to bury any) using a mirror and a medical forceps daily  - Remove the packing to facilitate showering.    - Antibiotics not indicated at this time  - Wound not tub soak the wound, only shower  - Answered all of his questions  - Will follow up the wound in 2 weeks or sooner if clinical condition changes.      Plastic & Reconstructive Surgery  Microsurgery  [facility]  c/o Tobi Jackman M.D.  [facility]  [address]  [address] [place] [address]    Work [phone]  Toll free [phone]  If no answer [phone]    "

## 2018-11-26 ENCOUNTER — TELEPHONE (OUTPATIENT)
Dept: PLASTIC SURGERY | Facility: CLINIC | Age: 53
End: 2018-11-26

## 2018-11-26 ENCOUNTER — OFFICE VISIT (OUTPATIENT)
Dept: PLASTIC SURGERY | Facility: CLINIC | Age: 53
End: 2018-11-26
Payer: COMMERCIAL

## 2018-11-26 VITALS
HEART RATE: 56 BPM | WEIGHT: 249 LBS | TEMPERATURE: 98 F | HEIGHT: 74 IN | DIASTOLIC BLOOD PRESSURE: 114 MMHG | SYSTOLIC BLOOD PRESSURE: 174 MMHG | BODY MASS INDEX: 31.95 KG/M2

## 2018-11-26 DIAGNOSIS — D17.0 LIPOMA OF SCALP: Primary | ICD-10-CM

## 2018-11-26 PROCEDURE — 99024 POSTOP FOLLOW-UP VISIT: CPT | Mod: S$GLB,,, | Performed by: SURGERY

## 2018-11-26 PROCEDURE — 99999 PR PBB SHADOW E&M-EST. PATIENT-LVL III: CPT | Mod: PBBFAC,,, | Performed by: SURGERY

## 2018-11-26 NOTE — PROGRESS NOTES
Plastic & Reconstructive Surgery Progress Note     Chapo Ohara  53 y.o.     Procedure Date: 10/19     Subjective  53 y.o.yo M s/p biopsy of occipital subcutaneous mass 10/19.    He is doing rinses, changing band-aids and wearing skull caps for compression.  Doing packing  Has been having minimal drainage       Objective  Granulating wound 0.5 cm.  Collapse of seroma cavity  Contraction of scar  No cellulitis, malodor or foul smelling discharge  Good contour of scalp     Labs  None peritnent     Assessment/Plan  53 y.o. Female who had excisional biopsy of scalp lipoma.  With seroma post operatively and delayed healing of incision.  Now with resolution of seroma cavity with packing and wound contracture.  Wound clean and should heal uneventfully at this point.    - Ok for air travel to [facility].  Instructed him to keep wound clean and covered  - Continue packing the wound (taking care not to bury any) using a mirror and a medical forceps daily  - Antibiotics not indicated at this time  - Wound not tub soak the wound, only shower  - Will follow up the wound in 2 weeks or sooner if clinical condition changes.       Plastic & Reconstructive Surgery  Microsurgery  [facility]  c/o Tobi Jackman M.D.  [facility]  [address]  [address] [facility] [place] [address]     Work [phone]  Toll free [phone]  If no answer [phone]

## 2019-01-03 ENCOUNTER — TELEPHONE (OUTPATIENT)
Dept: PLASTIC SURGERY | Facility: CLINIC | Age: 54
End: 2019-01-03

## 2019-01-03 NOTE — TELEPHONE ENCOUNTER
Left message for patient regarding pt wanting to cancel his upcoming appt on 01/08. I gave him our office number to call to further discuss scheduling options.         ----- Message from Tatiana Dan sent at 1/3/2019  7:55 AM CST -----  Contact: self  Pt is calling in regards to cancelling his post op appt on 01/08.    Pt can be reached at [phone].    Thank you

## 2019-02-11 ENCOUNTER — TELEPHONE (OUTPATIENT)
Dept: PLASTIC SURGERY | Facility: CLINIC | Age: 54
End: 2019-02-11

## 2019-02-11 NOTE — TELEPHONE ENCOUNTER
I called and spoke with pt to remind them of their appt scheduled for 1:00 PM , 2/12/19. Pt verbalized understanding of time date and location of appt.

## 2019-02-12 ENCOUNTER — OFFICE VISIT (OUTPATIENT)
Dept: PLASTIC SURGERY | Facility: CLINIC | Age: 54
End: 2019-02-12
Payer: COMMERCIAL

## 2019-02-12 VITALS
SYSTOLIC BLOOD PRESSURE: 182 MMHG | BODY MASS INDEX: 30.9 KG/M2 | WEIGHT: 240.63 LBS | HEART RATE: 60 BPM | DIASTOLIC BLOOD PRESSURE: 107 MMHG

## 2019-02-12 DIAGNOSIS — D17.0 LIPOMA OF SCALP: Primary | ICD-10-CM

## 2019-02-12 PROCEDURE — 99999 PR PBB SHADOW E&M-EST. PATIENT-LVL II: ICD-10-PCS | Mod: PBBFAC,,, | Performed by: SURGERY

## 2019-02-12 PROCEDURE — 99212 PR OFFICE/OUTPT VISIT, EST, LEVL II, 10-19 MIN: ICD-10-PCS | Mod: S$GLB,,, | Performed by: SURGERY

## 2019-02-12 PROCEDURE — 99999 PR PBB SHADOW E&M-EST. PATIENT-LVL II: CPT | Mod: PBBFAC,,, | Performed by: SURGERY

## 2019-02-12 PROCEDURE — 99212 OFFICE O/P EST SF 10 MIN: CPT | Mod: S$GLB,,, | Performed by: SURGERY

## 2019-02-12 NOTE — PROGRESS NOTES
Subjective:    Chapo [facility] for a follow up visit s/p excision of scalp mass. He is doing well today with no issues since excision. He denies fever, chills, nausea, drainage, discharge or other systemic signs of infection.     He says that the incision line occasionally itches and burns.  He rubs on the incision.  He does notice some periodic swelling over his occipiut    HPI    Past Medical History:   Diagnosis Date    History of colonic polyps 4/11/2017    HTN (hypertension) 3/7/2014    Hyperlipidemia 3/7/2014    Sleep apnea 9/10/2012       Past Surgical History:   Procedure Laterality Date    COLONOSCOPY N/A 12/11/2015    Performed by Balta Farah MD at [facility] ENDO       Objective:    BP (!) 182/107   Pulse 60   Wt 109.1 kg (240 lb 10.1 oz)   BMI 30.90 kg/m²   Physical Exam   - No cellulitis, erythema, or discharge from incision site  - Incision clean, dry and intact  - Good contour of occiput  - Stable scar at the occipital incision  - No obvious fluid collection  - No obvious infection    Assessment:  53 y.o. male s/p excisional biopsy of scalp lipoma. With seroma post operatively and delayed healing of incision.      Plan:  - No signs of infection  - RTC PRN  - Counseled on scar massage for desensitization and to improve contour  - Educated about healing process.  Advised him to wear high SPF sun block and avoid the sun  - He can tub soak his wound  - It takes up to 1 year for the scar to mature.  I would not offer any revision surgeries for the scar at that point.  In my opinion, he has excellent contour and now has expected healing after seroma and delayed wound healing post procedure  - He says he is happy with his result  - He can follow up prn or call with any questions as they come up.      Plastic & Reconstructive Surgery  Microsurgery  [facility]  c/o Tobi Jackman M.D.  [facility]  [address]  [address]  [place] [address]    Work [phone]  Toll free [phone]  If no answer [phone]

## 2019-03-08 ENCOUNTER — OFFICE VISIT (OUTPATIENT)
Dept: FAMILY MEDICINE | Facility: CLINIC | Age: 54
End: 2019-03-08
Payer: COMMERCIAL

## 2019-03-08 VITALS
TEMPERATURE: 98 F | HEIGHT: 74 IN | OXYGEN SATURATION: 98 % | DIASTOLIC BLOOD PRESSURE: 110 MMHG | SYSTOLIC BLOOD PRESSURE: 170 MMHG | HEART RATE: 57 BPM | WEIGHT: 241.19 LBS | BODY MASS INDEX: 30.95 KG/M2

## 2019-03-08 DIAGNOSIS — M54.50 ACUTE RIGHT-SIDED LOW BACK PAIN WITHOUT SCIATICA: Primary | ICD-10-CM

## 2019-03-08 DIAGNOSIS — R30.0 DYSURIA: ICD-10-CM

## 2019-03-08 DIAGNOSIS — G47.33 OSA (OBSTRUCTIVE SLEEP APNEA): ICD-10-CM

## 2019-03-08 DIAGNOSIS — I10 ESSENTIAL HYPERTENSION: ICD-10-CM

## 2019-03-08 DIAGNOSIS — T14.8XXA MUSCLE STRAIN: ICD-10-CM

## 2019-03-08 PROCEDURE — 99999 PR PBB SHADOW E&M-EST. PATIENT-LVL III: CPT | Mod: PBBFAC,,, | Performed by: NURSE PRACTITIONER

## 2019-03-08 PROCEDURE — 99999 PR PBB SHADOW E&M-EST. PATIENT-LVL III: ICD-10-PCS | Mod: PBBFAC,,, | Performed by: NURSE PRACTITIONER

## 2019-03-08 PROCEDURE — 99214 PR OFFICE/OUTPT VISIT, EST, LEVL IV, 30-39 MIN: ICD-10-PCS | Mod: S$GLB,,, | Performed by: NURSE PRACTITIONER

## 2019-03-08 PROCEDURE — 99214 OFFICE O/P EST MOD 30 MIN: CPT | Mod: S$GLB,,, | Performed by: NURSE PRACTITIONER

## 2019-03-08 RX ORDER — TIZANIDINE 4 MG/1
4 TABLET ORAL NIGHTLY PRN
Qty: 30 TABLET | Refills: 0 | Status: SHIPPED | OUTPATIENT
Start: 2019-03-08 | End: 2019-03-18

## 2019-03-08 RX ORDER — AMLODIPINE BESYLATE 5 MG/1
5 TABLET ORAL DAILY
Qty: 90 TABLET | Refills: 0 | Status: SHIPPED | OUTPATIENT
Start: 2019-03-08 | End: 2020-06-30 | Stop reason: SDUPTHER

## 2019-03-08 RX ORDER — IBUPROFEN 800 MG/1
800 TABLET ORAL NIGHTLY PRN
Qty: 30 TABLET | Refills: 0 | Status: SHIPPED | OUTPATIENT
Start: 2019-03-08 | End: 2021-01-12 | Stop reason: CLARIF

## 2019-03-09 ENCOUNTER — LAB VISIT (OUTPATIENT)
Dept: LAB | Facility: HOSPITAL | Age: 54
End: 2019-03-09
Attending: NURSE PRACTITIONER
Payer: COMMERCIAL

## 2019-03-09 DIAGNOSIS — R30.0 DYSURIA: ICD-10-CM

## 2019-03-09 LAB
BILIRUB UR QL STRIP: NEGATIVE
CLARITY UR REFRACT.AUTO: CLEAR
COLOR UR AUTO: YELLOW
GLUCOSE UR QL STRIP: NEGATIVE
HGB UR QL STRIP: NEGATIVE
KETONES UR QL STRIP: NEGATIVE
LEUKOCYTE ESTERASE UR QL STRIP: NEGATIVE
NITRITE UR QL STRIP: NEGATIVE
PH UR STRIP: 7 [PH] (ref 5–8)
PROT UR QL STRIP: NEGATIVE
SP GR UR STRIP: 1.01 (ref 1–1.03)
URN SPEC COLLECT METH UR: NORMAL

## 2019-03-09 PROCEDURE — 87086 URINE CULTURE/COLONY COUNT: CPT

## 2019-03-09 PROCEDURE — 81003 URINALYSIS AUTO W/O SCOPE: CPT

## 2019-03-10 LAB — BACTERIA UR CULT: NO GROWTH

## 2019-04-13 NOTE — PROGRESS NOTES
Patient Name: Chapo Ohara    : 1965  MRN: [id number]    Subjective:  Chapo is a 53 y.o. male who presents today for     1.  Right Side stabbing pain started 1 day ago.  Started when he got out the car.  He has an [employer] .  Has recently been lifting weights.  He has been working in his garden picking mustard greens.  Noticed the pain during this time.  Slight discomfort with urination.    2.  Blood pressure has been running high at home around 170/109.  Not taking any medication for his blood pressure.  Denies chest pain or shortness of breath or headache.  He has WANDA but not being treated.  Has split night study    The overall AHI was 28.2 with an oxygen malcolm of 83.0% .       Past Medical History  Past Medical History:   Diagnosis Date    History of colonic polyps 2017    HTN (hypertension) 3/7/2014    Hyperlipidemia 3/7/2014    Sleep apnea 9/10/2012       Past Surgical History  Past Surgical History:   Procedure Laterality Date    COLONOSCOPY N/A 2015    Performed by Balta Farah MD at [facility] END[facility]       Family History  Family History   Problem Relation Age of Onset    No Known Problems Mother     Hypertension Father        Social History  Social History     Socioeconomic History    Marital status:      Spouse name: Not on file    Number of children: Not on file    Years of education: Not on file    Highest education level: Not on file   Occupational History     Employer: [employer]   Social Needs    Financial resource strain: Not on file    Food insecurity:     Worry: Not on file     Inability: Not on file    Transportation needs:     Medical: Not on file     Non-medical: Not on file   Tobacco Use    Smoking status: Never Smoker    Smokeless tobacco: Never Used   Substance and Sexual Activity    Alcohol use: No    Drug use: No    Sexual activity: Yes     Partners: Female   Lifestyle    Physical activity:     Days per week: Not on file     Minutes per  "session: Not on file    Stress: Not on file   Relationships    Social connections:     Talks on phone: Not on file     Gets together: Not on file     Attends Dru[employer] service: Not on file     Active member of club or organization: Not on file     Attends meetings of clubs or organizations: Not on file     Relationship status: Not on file   Other Topics Concern    Not on file   Social History Narrative    Not on file       Allergies  Review of patient's allergies indicates:  No Known Allergies -reviewed and updated      Medications  Reviewed and updated.   Current Outpatient Medications   Medication Sig Dispense Refill    amLODIPine (NORVASC) 5 MG tablet Take 1 tablet (5 mg total) by mouth once daily. 90 tablet 0    ibuprofen (ADVIL,MOTRIN) 800 MG tablet Take 1 tablet (800 mg total) by mouth nightly as needed for Pain. 30 tablet 0     No current facility-administered medications for this visit.          Review of Systems   Constitutional: Positive for malaise/fatigue. Negative for chills and fever.   Respiratory: Negative for cough and shortness of breath.    Cardiovascular: Negative for chest pain and palpitations.   Gastrointestinal: Negative for abdominal pain.   Genitourinary: Positive for dysuria. Negative for frequency and urgency.   Musculoskeletal: Positive for back pain.   Neurological: Negative for headaches.         Physical Exam  Blood Pressure (Abnormal) 170/110 (BP Location: Left arm, Patient Position: Sitting, BP Method: Large (Manual))   Pulse (Abnormal) 57   Temperature 97.8 °F (36.6 °C) (Oral)   Height 6' 2" (1.88 m)   Weight 109.4 kg (241 lb 2.9 oz)   Oxygen Saturation 98%   Body Mass Index 30.97 kg/m²   Physical Exam   Constitutional: He is oriented to person, place, and time. He appears well-developed. No distress.   HENT:   Head: Normocephalic.   Nose: Nose normal.   Mouth/Throat: Oropharynx is clear and moist.   Cardiovascular: Normal rate and regular rhythm.   Murmur " heard.  Pulmonary/Chest: Effort normal and breath sounds normal.   Musculoskeletal: He exhibits no edema.        Lumbar back: He exhibits tenderness and pain (Right flexion). He exhibits no bony tenderness.   Neurological: He is alert and oriented to person, place, and time.   Skin: He is not diaphoretic.   Psychiatric: He has a normal mood and affect. His behavior is normal.         Assessment/Plan:  Chapo Ohara is a 53 y.o. male who presents today for :    Acute right-sided low back pain without sciatica/Muscle strain  Advised hydration, NSAID, muscle relaxant, light activity, rest from straining, prolonged sitting  -     tiZANidine (ZANAFLEX) 4 MG tablet; Take 1 tablet (4 mg total) by mouth nightly as needed.  Dispense: 30 tablet; Refill: 0  -     ibuprofen (ADVIL,MOTRIN) 800 MG tablet; Take 1 tablet (800 mg total) by mouth nightly as needed for Pain.  Dispense: 30 tablet; Refill: 0    Dysuria  Labs obtained to rule out urinary tract infection or kidney stone due to right-sided back pain  -     Urinalysis; Future; Expected date: 03/08/2019  -     Urine culture; Future; Expected date: 03/08/2019    Essential hypertension  Patient started on amlodipine, recommend baseline labs and follow up with primary care provider  -     amLODIPine (NORVASC) 5 MG tablet; Take 1 tablet (5 mg total) by mouth once daily.  Dispense: 90 tablet; Refill: 0  -     Comprehensive metabolic panel; Future; Expected date: 03/08/2019  -     Lipid panel; Future; Expected date: 03/08/2019  -     CBC auto differential; Future; Expected date: 03/08/2019  -     TSH; Future; Expected date: 03/08/2019  -     Vitamin D; Future; Expected date: 03/08/2019    WANDA (obstructive sleep apnea)  The patient has symptoms of fatigue with finding of hypertension, obesity.  Split night study and passed confirmed sleep apnea.  Recommend patient obtains treatment for this.  After discussion of all option patient agreed to APAP.  Counseled to follow up in 6 weeks  after in usage to assess treatment effectiveness  -     CPAP FOR HOME USE        Follow-up follow up with pcp for annual.

## 2020-06-02 ENCOUNTER — NURSE TRIAGE (OUTPATIENT)
Dept: ADMINISTRATIVE | Facility: CLINIC | Age: 55
End: 2020-06-02

## 2020-06-02 ENCOUNTER — TELEPHONE (OUTPATIENT)
Dept: FAMILY MEDICINE | Facility: CLINIC | Age: 55
End: 2020-06-02

## 2020-06-02 NOTE — TELEPHONE ENCOUNTER
Went to dentist and was told BP too high. 190/120. . Was able to jog 4 miles today and feels good. Denies any s/s. Just left dentist office. Advised pt to go to Ed for elevated heart rate with BP- pt prefers MDV at clinic.     Reason for Disposition   Heart beating very rapidly (e.g., > 140 / minute) and present now (EXCEPTION: during exercise)   Systolic BP >= 160 OR Diastolic >= 100, and any cardiac or neurologic symptoms (e.g., chest pain, difficulty breathing, unsteady gait, blurred vision)    Additional Information   Negative: Sounds like a life-threatening emergency to the triager   Negative: Passed out (i.e., fainted, collapsed and was not responding)   Negative: Shock suspected (e.g., cold/pale/clammy skin, too weak to stand, low BP, rapid pulse)   Negative: Difficult to awaken or acting confused (e.g., disoriented, slurred speech)   Negative: Visible sweat on face or sweat dripping down face   Negative: Unable to walk, or can only walk with assistance (e.g., requires support)   Negative: Received SHOCK from implantable cardiac defibrillator and has persisting symptoms (i.e., palpitations, lightheadedness)   Negative: Sounds like a life-threatening emergency to the triager   Negative: Difficulty breathing   Negative: Dizziness, lightheadedness, or weakness    Protocols used: HEART RATE AND HEARTBEAT [id number], HIGH BLOOD PRESSURE-A-OH

## 2020-06-02 NOTE — TELEPHONE ENCOUNTER
Patient said that he has an appointment in 2 days and is Ok with that.  Patient also said that he is asymptomatic and will continue monitoring his B/P and if he starts feeling symptomatic, he will go to the ED.

## 2020-06-02 NOTE — TELEPHONE ENCOUNTER
----- Message from Malissa Pillai sent at 6/2/2020  3:02 PM CDT -----  Contact: BALAJI SOTO [[id number]]  Type: Patient Call Back    Who called: BALAJI SOTO [[id number]]    What is the request in detail: Patient is requesting a call back. He states that he needs to be seen today. He spoke with the nurse on call and his BP was 190/120. He states that he was advised to schedule an appointment.   Please advise.    Can the clinic reply by [facility]? No    Best call back number: [phone]    Additional Information: N/A

## 2020-06-02 NOTE — TELEPHONE ENCOUNTER
Follow-up in two days is okay but to make him feel better, let us give him a call on Wednesday morning and ask how the blood pressure is doing.  He also has not been seen by me in three years nor by anyone in one year so let us call and make sure he has his amlodipine     amlodipine 5 mg a day    If so and if blood pressure still running high he can take 10 mg a day.    If he is out, he definitely needs to restart, authorize a refill and so forth

## 2020-06-03 NOTE — TELEPHONE ENCOUNTER
Spoke to patient about below message. Patient states he is not taking medications at this time and the medication that was given to him was on a trail basis and only had one refill. Patient states his blood pressure fluctuates due to taking it all the time. Instructed patient to take his blood pressure first thing in the morning to keep a record of consistent readings. Verbalized understanding. Patient does not need any thing for his blood pressure at this time. Will wait to see the NP tomorrow.

## 2020-06-04 ENCOUNTER — OFFICE VISIT (OUTPATIENT)
Dept: FAMILY MEDICINE | Facility: CLINIC | Age: 55
End: 2020-06-04
Payer: COMMERCIAL

## 2020-06-04 VITALS
TEMPERATURE: 98 F | WEIGHT: 242.94 LBS | BODY MASS INDEX: 31.18 KG/M2 | OXYGEN SATURATION: 96 % | HEIGHT: 74 IN | DIASTOLIC BLOOD PRESSURE: 100 MMHG | SYSTOLIC BLOOD PRESSURE: 158 MMHG | HEART RATE: 68 BPM

## 2020-06-04 DIAGNOSIS — N40.1 BENIGN PROSTATIC HYPERPLASIA WITH URINARY FREQUENCY: ICD-10-CM

## 2020-06-04 DIAGNOSIS — I10 ESSENTIAL HYPERTENSION: Primary | ICD-10-CM

## 2020-06-04 DIAGNOSIS — G47.09 OTHER INSOMNIA: ICD-10-CM

## 2020-06-04 DIAGNOSIS — R35.0 BENIGN PROSTATIC HYPERPLASIA WITH URINARY FREQUENCY: ICD-10-CM

## 2020-06-04 PROCEDURE — 99999 PR PBB SHADOW E&M-EST. PATIENT-LVL III: ICD-10-PCS | Mod: PBBFAC,,, | Performed by: NURSE PRACTITIONER

## 2020-06-04 PROCEDURE — 99214 OFFICE O/P EST MOD 30 MIN: CPT | Mod: S$GLB,,, | Performed by: NURSE PRACTITIONER

## 2020-06-04 PROCEDURE — 99214 PR OFFICE/OUTPT VISIT, EST, LEVL IV, 30-39 MIN: ICD-10-PCS | Mod: S$GLB,,, | Performed by: NURSE PRACTITIONER

## 2020-06-04 PROCEDURE — 99999 PR PBB SHADOW E&M-EST. PATIENT-LVL III: CPT | Mod: PBBFAC,,, | Performed by: NURSE PRACTITIONER

## 2020-06-04 RX ORDER — TAMSULOSIN HYDROCHLORIDE 0.4 MG/1
0.4 CAPSULE ORAL DAILY
Qty: 90 CAPSULE | Refills: 1 | Status: SHIPPED | OUTPATIENT
Start: 2020-06-04 | End: 2021-01-12 | Stop reason: CLARIF

## 2020-06-04 RX ORDER — TRAZODONE HYDROCHLORIDE 50 MG/1
50 TABLET ORAL NIGHTLY PRN
Qty: 90 TABLET | Refills: 1 | Status: SHIPPED | OUTPATIENT
Start: 2020-06-04 | End: 2021-01-12 | Stop reason: CLARIF

## 2020-06-04 NOTE — PROGRESS NOTES
Subjective:       Patient ID: Chapo Ohara is a 55 y.o. male.    Chief Complaint: Hypertension (F/U)    55-year-old male presents to the clinic today for blood pressure checkup.  His blood pressure today is 158/100.  He does not take his amlodipine 5 mg as prescribed.  He is convinced that he does not have hypertension.  He says the reason his blood pressure is elevated is because he does not get enough sleep.  He is a  and works 16 hr a day 5 days a week.  He also gets up a lot during the night to go to the bathroom and contributes to loss of sleep.  He denies any headaches, dizziness, or blurred vision.  He denies any cardiac chest pain, heart palpitations, shortness breath, or swelling to lower extremities.  The he says his home blood pressures run anywhere from 140-150/80-90.  I stressed the importance of uncontrolled hypertension can cause cerebrovascular accidents and chronic kidney disease which could eventually lead to dialysis.    Past Medical History:   Diagnosis Date    History of colonic polyps 4/11/2017    HTN (hypertension) 3/7/2014    Hyperlipidemia 3/7/2014    Sleep apnea 9/10/2012     Past Surgical History:   Procedure Laterality Date    COLONOSCOPY N/A 12/11/2015    Procedure: COLONOSCOPY;  Surgeon: Balta Farah MD;  [facility];  Service: Endoscopy;  Laterality: N/A;      reports that he has never smoked. He has never used smokeless tobacco. He reports that he does not drink alcohol or use drugs.  Review of Systems   Constitutional: Negative for activity change and fatigue.   Respiratory: Negative for cough, chest tightness, shortness of breath and wheezing.    Cardiovascular: Negative for chest pain, palpitations and leg swelling.   Gastrointestinal: Negative for abdominal pain, diarrhea, nausea and vomiting.   Genitourinary: Positive for frequency.        Frequency at night    Musculoskeletal: Negative for back pain and gait problem.   Skin: Negative for color change.    Neurological: Negative for dizziness, light-headedness and headaches.   Psychiatric/Behavioral: Positive for sleep disturbance.       Objective:      Physical Exam   Constitutional: He is oriented to person, place, and time. He appears well-developed and well-nourished. No distress.   Eyes: Pupils are equal, round, and reactive to light. Conjunctivae and EOM are normal. Right eye exhibits no discharge. Left eye exhibits no discharge. No scleral icterus.   Neck: Normal range of motion. Neck supple. No JVD present.   No bruits noted    Cardiovascular: Normal rate, regular rhythm and normal heart sounds.   No murmur heard.  Pulmonary/Chest: Effort normal and breath sounds normal. No respiratory distress. He has no wheezes. He has no rales.   Abdominal: Soft. Bowel sounds are normal. There is no tenderness.   Musculoskeletal: Normal range of motion. He exhibits no edema.   Neurological: He is alert and oriented to person, place, and time.   Skin: Skin is warm and dry. He is not diaphoretic.   Psychiatric: He has a normal mood and affect.       Assessment:       1. Essential hypertension    2. Benign prostatic hyperplasia with urinary frequency    3. Other insomnia        Plan:         Essential hypertension  - start checking blood pressure twice and day and keep record of readings and bring blood pressure machine to visit with Dr. Torres on 6/30/2020    Benign prostatic hyperplasia with urinary frequency  -     tamsulosin (FLOMAX) 0.4 mg Cap; Take 1 capsule (0.4 mg total) by mouth once daily.  Dispense: 90 capsule; Refill: 1    Other insomnia  -     traZODone (DESYREL) 50 MG tablet; Take 1 tablet (50 mg total) by mouth nightly as needed for Insomnia.  Dispense: 90 tablet; Refill: 1

## 2020-06-04 NOTE — PATIENT INSTRUCTIONS
Start Flomax .4mg every day  Take Trazodone 50 mg one every night as needed for sleep when you can sleep 8 hours  Take blood pressure twice a day keep readings and blood pressure to physical with Dr. Torres  Goal is below 140/90

## 2020-06-16 ENCOUNTER — PATIENT OUTREACH (OUTPATIENT)
Dept: ADMINISTRATIVE | Facility: HOSPITAL | Age: 55
End: 2020-06-16

## 2020-06-16 ENCOUNTER — TELEPHONE (OUTPATIENT)
Dept: FAMILY MEDICINE | Facility: CLINIC | Age: 55
End: 2020-06-16

## 2020-06-16 DIAGNOSIS — E78.5 HYPERLIPIDEMIA, UNSPECIFIED HYPERLIPIDEMIA TYPE: Primary | ICD-10-CM

## 2020-06-16 DIAGNOSIS — Z00.00 ROUTINE MEDICAL EXAM: Primary | ICD-10-CM

## 2020-06-16 DIAGNOSIS — I10 ESSENTIAL HYPERTENSION: ICD-10-CM

## 2020-06-16 NOTE — TELEPHONE ENCOUNTER
----- Message from Mariella Galvan LPN sent at 6/16/2020 11:00 AM CDT -----  Regarding: Additional Labs / Appt 06/26/20  Good morning Dr. Torres,    The patient has an appointment scheduled for 06/30/20. I have ordered a cmp and lipid profile. Please order any additional labs.     Thanks,    Mariella

## 2020-06-17 NOTE — TELEPHONE ENCOUNTER
Ok, pt had strange affect with appt w NP so confirm he is coming to appt w me    Ask to record BP and bring all readings    In future, if seen that yearly labs needed, perhaps they can be pended and I can sign    Looks like needs repeat Vit D, TSH, CBC, and last PSA was 2018    I will put those in but when lasb are yearly its easier to see what might be needed

## 2020-06-26 ENCOUNTER — LAB VISIT (OUTPATIENT)
Dept: LAB | Facility: HOSPITAL | Age: 55
End: 2020-06-26
Attending: INTERNAL MEDICINE
Payer: COMMERCIAL

## 2020-06-26 DIAGNOSIS — Z00.00 ROUTINE MEDICAL EXAM: ICD-10-CM

## 2020-06-26 DIAGNOSIS — I10 ESSENTIAL HYPERTENSION: ICD-10-CM

## 2020-06-26 DIAGNOSIS — E78.5 HYPERLIPIDEMIA, UNSPECIFIED HYPERLIPIDEMIA TYPE: ICD-10-CM

## 2020-06-26 LAB
25(OH)D3+25(OH)D2 SERPL-MCNC: 22 NG/ML (ref 30–96)
ALBUMIN SERPL BCP-MCNC: 4 G/DL (ref 3.5–5.2)
ALP SERPL-CCNC: 44 U/L (ref 55–135)
ALT SERPL W/O P-5'-P-CCNC: 23 U/L (ref 10–44)
ANION GAP SERPL CALC-SCNC: 6 MMOL/L (ref 8–16)
AST SERPL-CCNC: 30 U/L (ref 10–40)
BASOPHILS # BLD AUTO: 0.05 K/UL (ref 0–0.2)
BASOPHILS NFR BLD: 1.1 % (ref 0–1.9)
BILIRUB SERPL-MCNC: 0.8 MG/DL (ref 0.1–1)
BUN SERPL-MCNC: 13 MG/DL (ref 6–20)
CALCIUM SERPL-MCNC: 9.6 MG/DL (ref 8.7–10.5)
CHLORIDE SERPL-SCNC: 102 MMOL/L (ref 95–110)
CHOLEST SERPL-MCNC: 261 MG/DL (ref 120–199)
CHOLEST/HDLC SERPL: 4.9 {RATIO} (ref 2–5)
CO2 SERPL-SCNC: 28 MMOL/L (ref 23–29)
COMPLEXED PSA SERPL-MCNC: 5.1 NG/ML (ref 0–4)
CREAT SERPL-MCNC: 1.3 MG/DL (ref 0.5–1.4)
DIFFERENTIAL METHOD: ABNORMAL
EOSINOPHIL # BLD AUTO: 0.2 K/UL (ref 0–0.5)
EOSINOPHIL NFR BLD: 5.2 % (ref 0–8)
ERYTHROCYTE [DISTWIDTH] IN BLOOD BY AUTOMATED COUNT: 14.1 % (ref 11.5–14.5)
EST. GFR  (AFRICAN AMERICAN): >60 ML/MIN/1.73 M^2
EST. GFR  (NON AFRICAN AMERICAN): >60 ML/MIN/1.73 M^2
GLUCOSE SERPL-MCNC: 89 MG/DL (ref 70–110)
HCT VFR BLD AUTO: 51.4 % (ref 40–54)
HDLC SERPL-MCNC: 53 MG/DL (ref 40–75)
HDLC SERPL: 20.3 % (ref 20–50)
HGB BLD-MCNC: 16 G/DL (ref 14–18)
IMM GRANULOCYTES # BLD AUTO: 0.01 K/UL (ref 0–0.04)
IMM GRANULOCYTES NFR BLD AUTO: 0.2 % (ref 0–0.5)
LDLC SERPL CALC-MCNC: 193.2 MG/DL (ref 63–159)
LYMPHOCYTES # BLD AUTO: 1.2 K/UL (ref 1–4.8)
LYMPHOCYTES NFR BLD: 25.9 % (ref 18–48)
MCH RBC QN AUTO: 28.9 PG (ref 27–31)
MCHC RBC AUTO-ENTMCNC: 31.1 G/DL (ref 32–36)
MCV RBC AUTO: 93 FL (ref 82–98)
MONOCYTES # BLD AUTO: 0.5 K/UL (ref 0.3–1)
MONOCYTES NFR BLD: 11.3 % (ref 4–15)
NEUTROPHILS # BLD AUTO: 2.6 K/UL (ref 1.8–7.7)
NEUTROPHILS NFR BLD: 56.3 % (ref 38–73)
NONHDLC SERPL-MCNC: 208 MG/DL
NRBC BLD-RTO: 0 /100 WBC
PLATELET # BLD AUTO: 216 K/UL (ref 150–350)
PMV BLD AUTO: 11 FL (ref 9.2–12.9)
POTASSIUM SERPL-SCNC: 4.3 MMOL/L (ref 3.5–5.1)
PROT SERPL-MCNC: 7.3 G/DL (ref 6–8.4)
RBC # BLD AUTO: 5.53 M/UL (ref 4.6–6.2)
SODIUM SERPL-SCNC: 136 MMOL/L (ref 136–145)
TRIGL SERPL-MCNC: 74 MG/DL (ref 30–150)
TSH SERPL DL<=0.005 MIU/L-ACNC: 1.23 UIU/ML (ref 0.4–4)
WBC # BLD AUTO: 4.59 K/UL (ref 3.9–12.7)

## 2020-06-26 PROCEDURE — 80053 COMPREHEN METABOLIC PANEL: CPT

## 2020-06-26 PROCEDURE — 85025 COMPLETE CBC W/AUTO DIFF WBC: CPT

## 2020-06-26 PROCEDURE — 36415 COLL VENOUS BLD VENIPUNCTURE: CPT | Mod: PO

## 2020-06-26 PROCEDURE — 84443 ASSAY THYROID STIM HORMONE: CPT

## 2020-06-26 PROCEDURE — 80061 LIPID PANEL: CPT

## 2020-06-26 PROCEDURE — 82306 VITAMIN D 25 HYDROXY: CPT

## 2020-06-26 PROCEDURE — 84153 ASSAY OF PSA TOTAL: CPT

## 2020-06-30 ENCOUNTER — OFFICE VISIT (OUTPATIENT)
Dept: FAMILY MEDICINE | Facility: CLINIC | Age: 55
End: 2020-06-30
Payer: COMMERCIAL

## 2020-06-30 VITALS
RESPIRATION RATE: 20 BRPM | TEMPERATURE: 98 F | OXYGEN SATURATION: 97 % | HEART RATE: 77 BPM | SYSTOLIC BLOOD PRESSURE: 136 MMHG | DIASTOLIC BLOOD PRESSURE: 80 MMHG | BODY MASS INDEX: 30.62 KG/M2 | WEIGHT: 238.56 LBS | HEIGHT: 74 IN

## 2020-06-30 DIAGNOSIS — E78.5 HYPERLIPIDEMIA, UNSPECIFIED HYPERLIPIDEMIA TYPE: ICD-10-CM

## 2020-06-30 DIAGNOSIS — G47.33 OSA (OBSTRUCTIVE SLEEP APNEA): ICD-10-CM

## 2020-06-30 DIAGNOSIS — N40.1 BENIGN PROSTATIC HYPERPLASIA WITH URINARY FREQUENCY: ICD-10-CM

## 2020-06-30 DIAGNOSIS — I10 ESSENTIAL HYPERTENSION: ICD-10-CM

## 2020-06-30 DIAGNOSIS — R97.20 ELEVATED PSA: ICD-10-CM

## 2020-06-30 DIAGNOSIS — Z86.010 HISTORY OF COLONIC POLYPS: ICD-10-CM

## 2020-06-30 DIAGNOSIS — R35.0 BENIGN PROSTATIC HYPERPLASIA WITH URINARY FREQUENCY: ICD-10-CM

## 2020-06-30 DIAGNOSIS — G47.09 OTHER INSOMNIA: ICD-10-CM

## 2020-06-30 DIAGNOSIS — Z00.00 ROUTINE MEDICAL EXAM: Primary | ICD-10-CM

## 2020-06-30 PROCEDURE — 99999 PR PBB SHADOW E&M-EST. PATIENT-LVL III: ICD-10-PCS | Mod: PBBFAC,,, | Performed by: INTERNAL MEDICINE

## 2020-06-30 PROCEDURE — 99999 PR PBB SHADOW E&M-EST. PATIENT-LVL III: CPT | Mod: PBBFAC,,, | Performed by: INTERNAL MEDICINE

## 2020-06-30 PROCEDURE — 99396 PR PREVENTIVE VISIT,EST,40-64: ICD-10-PCS | Mod: S$GLB,,, | Performed by: INTERNAL MEDICINE

## 2020-06-30 PROCEDURE — 99396 PREV VISIT EST AGE 40-64: CPT | Mod: S$GLB,,, | Performed by: INTERNAL MEDICINE

## 2020-06-30 RX ORDER — ROSUVASTATIN CALCIUM 10 MG/1
10 TABLET, COATED ORAL DAILY
Qty: 90 TABLET | Refills: 3 | Status: SHIPPED | OUTPATIENT
Start: 2020-06-30 | End: 2020-07-20

## 2020-06-30 RX ORDER — AMLODIPINE BESYLATE 5 MG/1
5 TABLET ORAL DAILY
Qty: 90 TABLET | Refills: 0 | Status: SHIPPED | OUTPATIENT
Start: 2020-06-30 | End: 2020-09-27

## 2020-06-30 NOTE — PROGRESS NOTES
Chief complaint,Physical exam       55-year-old black male here for his physical.  He is active.  He is watching his diet.  His cholesterol responded last year.  He had a colonoscopy showing polyps.  He was also slightly anemic and also was iron deficient when he tried to donate blood.  .  No other bleeding.  Family history of prostate cancer we will continue yearly PSA checks.  He had 4 uncles with colon cancer.  He currently does not have insurance coverage for medications and apparently does not cover his colonoscopy.  We discussed looking into the cost and I will put in order.    He was given Flomax for nocturia and it helped but caused retrograde ejaculation so he quit.  We discussed his elevated PSA and the need to see Urology.    He brings in his blood pressure readings and essentially for greater than 20 days they are all high.  There were in the 150-160 range but now persistently in the 130s.  We discussed that this also is hypertension and explained him that he does need to take a medication every day.  We also discussed his severe hyperlipidemia and the benefits of statin and so will try Crestor and he can look on the good Rx card and if Lipitor is cheaper we will call in accordingly.    We discussed his vitamin-D deficiency and he is taking a multivitamin but probably should add 1000 units.    Apparently he has sleep apnea by remote test but never had CPAP.  Apparently when he took the sleep apnea test he had some dental infections and was told by the dentist that might have affected his sleep study.  He only sleeps about 5 hr per day but is not sleepy during the day or as symptoms of sleep apnea.    Seen and discussed with NP:  Chief Complaint: Hypertension (F/U)  55-year-old male presents to the clinic today for blood pressure checkup.  His blood pressure today is 158/100.  He does not take his amlodipine 5 mg as prescribed.  He is convinced that he does not have hypertension.  He says the reason his  blood pressure is elevated is because he does not get enough sleep.  He is a  and works 16 hr a day 5 days a week.  He also gets up a lot during the night to go to the bathroom and contributes to loss of sleep.  He denies any headaches, dizziness, or blurred vision.  He denies any cardiac chest pain, heart palpitations, shortness breath, or swelling to lower extremities.  The he says his home blood pressures run anywhere from 140-150/80-90.  I stressed the importance of uncontrolled hypertension can cause cerebrovascular accidents and chronic kidney disease which could eventually lead to dialysis.    ROS:   CONST: weight stable. EYES: no vision change. ENT: no sore throat. CV: no chest pain w/ exertion. RESP: no shortness of breath. GI: no nausea, vomiting, diarrhea. No dysphagia. : no urinary issues. MUSCULOSKELETAL: no new myalgias or arthralgias. SKIN: no new changes. NEURO: no focal deficits. PSYCH: no new issues. ENDOCRINE: no polyuria. HEME: no lymph nodes. ALLERGY: no general pruritis.     PMH:   1.  in 2006, 200 in 2014  2. HTN  3. WANDA , not on CPAP  4. 3 Polyps 3/15 --3-5 yrs   5.  Anemia, likely secondary to blood donations 4 times a year  6.  Vitamin-D deficiency                                                                   PSH: dental/sinus surgery                                                                     SH:  Works as a  at [employer].  Does not smoke or drink.                                                                                         FMH: Father with prostate cancer at age 50.  Four uncles with colon cancer.  Mom with borderline DM.  One sister and four brothers with no problem.    Vitals as above  GGen: no distress  EYES: conjunctiva clear, non-icteric, PERRL  ENT: nose clear, nasal mucosa normal, oropharynx clear and moist, teeth good  NECK:supple, thyroid non-palpable  RESP: effort is good, lungs clear  CV: heart RRR w/o murmur, gallops or  "rubs; no carotid bruits, no edema  GI: abdomen soft, non-distended, non-tender, no hepatosplenomegaly  MS: gait normal, no clubbing or cyanosis of the digits,   SKIN: no rashes, warm to touch     Chapo was seen today for follow-up and discuss colonoscopy.    Diagnoses and all orders for this visit:    Routine medical exam, due for colonoscopy in explained the importance now that he is at five years he really needs to look into getting it done.  We discussed his elevated PSA and needs to see Urology.  We discussed hypertension hyperlipidemia and how they will increase significantly his risk of stroke and heart attack and that we need to treat both.    Essential hypertension  -     amLODIPine (NORVASC) 5 MG tablet; Take 1 tablet (5 mg total) by mouth once daily.    Hyperlipidemia, unspecified hyperlipidemia type    WANDA (obstructive sleep apnea) presumably asymptomatic per patient he does not really wish to pursue it any further    History of colonic polyps  -     Case request GI: COLONOSCOPY    Benign prostatic hyperplasia with urinary frequency    Other insomnia    Elevated PSA  -     Ambulatory referral/consult to Urology; Future    Other orders  -     rosuvastatin (CRESTOR) 10 MG tablet; Take 1 tablet (10 mg total) by mouth once daily.       Based on anxiety expressed referable to the above issues access would not be therapeutic"This note will not be shared with the patient."  "

## 2020-07-06 ENCOUNTER — TELEPHONE (OUTPATIENT)
Dept: FAMILY MEDICINE | Facility: CLINIC | Age: 55
End: 2020-07-06

## 2020-07-06 NOTE — TELEPHONE ENCOUNTER
----- Message from Amirah Loja sent at 7/6/2020 12:02 PM CDT -----  Regarding: Self/  [phone]  Type: Patient Call Back    Who called:  Patient    What is the request in detail:  Patient is needing colonoscopy scheduled.  He stated it's been 5 years now.  He would also like staff to give him a call once it's been taken care of.  He's taken care of the cost part.  Thank you    Would the patient rather a call back or a response via [facility]?  Call back    Best call back number:   [phone]

## 2020-07-07 ENCOUNTER — TELEPHONE (OUTPATIENT)
Dept: FAMILY MEDICINE | Facility: CLINIC | Age: 55
End: 2020-07-07

## 2020-07-08 DIAGNOSIS — Z12.11 COLON CANCER SCREENING: ICD-10-CM

## 2020-07-20 RX ORDER — ATORVASTATIN CALCIUM 40 MG/1
40 TABLET, FILM COATED ORAL DAILY
Qty: 30 TABLET | Refills: 3 | Status: SHIPPED | OUTPATIENT
Start: 2020-07-20 | End: 2021-01-12 | Stop reason: CLARIF

## 2020-07-24 DIAGNOSIS — Z12.11 COLON CANCER SCREENING: Primary | ICD-10-CM

## 2020-08-03 ENCOUNTER — PATIENT OUTREACH (OUTPATIENT)
Dept: ADMINISTRATIVE | Facility: OTHER | Age: 55
End: 2020-08-03

## 2020-08-03 NOTE — PROGRESS NOTES
Requested updates within Care Everywhere.  Patient's chart was reviewed for overdue LIDIA topics.  Immunizations reconciled.

## 2020-08-04 ENCOUNTER — OFFICE VISIT (OUTPATIENT)
Dept: UROLOGY | Facility: CLINIC | Age: 55
End: 2020-08-04
Payer: COMMERCIAL

## 2020-08-04 VITALS — WEIGHT: 237.88 LBS | BODY MASS INDEX: 30.53 KG/M2 | TEMPERATURE: 98 F | HEIGHT: 74 IN

## 2020-08-04 DIAGNOSIS — N40.1 BPH WITH OBSTRUCTION/LOWER URINARY TRACT SYMPTOMS: ICD-10-CM

## 2020-08-04 DIAGNOSIS — R97.20 ELEVATED PSA: Primary | ICD-10-CM

## 2020-08-04 DIAGNOSIS — R35.1 NOCTURIA MORE THAN TWICE PER NIGHT: ICD-10-CM

## 2020-08-04 DIAGNOSIS — N13.8 BPH WITH OBSTRUCTION/LOWER URINARY TRACT SYMPTOMS: ICD-10-CM

## 2020-08-04 PROCEDURE — 81001 PR  URINALYSIS, AUTO, W/SCOPE: ICD-10-PCS | Mod: S$GLB,,, | Performed by: UROLOGY

## 2020-08-04 PROCEDURE — 99999 PR PBB SHADOW E&M-EST. PATIENT-LVL IV: ICD-10-PCS | Mod: PBBFAC,,, | Performed by: UROLOGY

## 2020-08-04 PROCEDURE — 99999 PR PBB SHADOW E&M-EST. PATIENT-LVL IV: CPT | Mod: PBBFAC,,, | Performed by: UROLOGY

## 2020-08-04 PROCEDURE — 81001 URINALYSIS AUTO W/SCOPE: CPT | Mod: S$GLB,,, | Performed by: UROLOGY

## 2020-08-04 PROCEDURE — 99244 PR OFFICE CONSULTATION,LEVEL IV: ICD-10-PCS | Mod: 25,S$GLB,, | Performed by: UROLOGY

## 2020-08-04 PROCEDURE — 99244 OFF/OP CNSLTJ NEW/EST MOD 40: CPT | Mod: 25,S$GLB,, | Performed by: UROLOGY

## 2020-08-04 RX ORDER — CIPROFLOXACIN 500 MG/1
500 TABLET ORAL 2 TIMES DAILY
Qty: 6 TABLET | Refills: 0 | Status: SHIPPED | OUTPATIENT
Start: 2020-08-04 | End: 2020-08-07

## 2020-08-04 NOTE — LETTER
August 4, 2020      Prashanth Torres MD  [address] [facility]  [place] [address]           [facility]  [address]   [place] [address]  Phone: [phone]  Fax: [phone]          Patient: Chapo Ohara   MR Number: [id number]   YOB: 1965   Date of Visit: 8/4/2020       Dear Dr. Prashanth Torres:    Thank you for referring Chapo Ohara to me for evaluation. Attached you will find relevant portions of my assessment and plan of care.    If you have questions, please do not hesitate to call me. I look forward to following Chapo Ohara along with you.    Sincerely,    BOOGIE Hammond MD    Enclosure  CC:  No Recipients    If you would like to receive this communication electronically, please contact [email or web address] or [phone] to request more information on [facility] access.    For providers and/or their staff who would like to refer a patient to [facility], please contact us through our one-stop-shop provider referral line, [facility], at [phone].    If you feel you have received this communication in error or would no longer like to receive these types of communications, please e-mail [email or web address]

## 2020-08-04 NOTE — PROGRESS NOTES
Subjective:       Patient ID: Chapo Ohara is a 55 y.o. male who was referred by Prashanth Torres MD    Chief Complaint:   Chief Complaint   Patient presents with    Elevated PSA     new pt coming in for elevated psa an urinary frequecy pt states he gets up at least twice at night    Urinary Frequency       Elevated PSA  Patient is here with an elevated PSA. He has no personal history and a family history of prostate cancer with his brother and possibly his father.  He has a prior genitourinary history of urinary frequency.  Previous PSA values are :  Lab Results   Component Value Date    PSA 5.1 (H) 06/26/2020    PSA 3.9 04/11/2018    PSA 3.1 04/11/2017     Benign Prostatic Hyperplasia  He patient reports nocturia two times a night. He denies urgency and weak stream. The patient states symptoms are of moderate severity. Onset of symptoms was several years ago and was gradual in onset.  He has no personal history and a family history of prostate cancer with his brother and possibly his father. He reports a history of no complicating symptoms. He denies flank pain, gross hematuria, kidney stones and recurrent UTI.  He is currently taking no prostate medications.  He took Flomax for a week and had some improvement in his nocturia.  He also had retrograde ejaculation and stopped.        ACTIVE MEDICAL ISSUES:  Patient Active Problem List   Diagnosis    Sleep apnea    Hyperlipidemia    HTN (hypertension)    Benign prostatic hyperplasia with urinary frequency    Cardiac murmur    History of colonic polyps    Other insomnia       PAST MEDICAL HISTORY  Past Medical History:   Diagnosis Date    History of colonic polyps 4/11/2017    HTN (hypertension) 3/7/2014    Hyperlipidemia 3/7/2014    Sleep apnea 9/10/2012       PAST SURGICAL HISTORY:  Past Surgical History:   Procedure Laterality Date    COLONOSCOPY N/A 12/11/2015    Procedure: COLONOSCOPY;  Surgeon: Balta Farah MD;  Location: [facility];  Service:  Endoscopy;  Laterality: N/A;       SOCIAL HISTORY:  Social History     Tobacco Use    Smoking status: Never Smoker    Smokeless tobacco: Never Used   Substance Use Topics    Alcohol use: No    Drug use: No       FAMILY HISTORY:  Family History   Problem Relation Age of Onset    No Known Problems Mother     Hypertension Father        ALLERGIES AND MEDICATIONS: updated and reviewed.  Review of patient's allergies indicates:  No Known Allergies  Current Outpatient Medications   Medication Sig    amLODIPine (NORVASC) 5 MG tablet Take 1 tablet (5 mg total) by mouth once daily. (Patient not taking: Reported on 8/4/2020)    atorvastatin (LIPITOR) 40 MG tablet Take 1 tablet (40 mg total) by mouth once daily. (Patient not taking: Reported on 8/4/2020)    ciprofloxacin HCl (CIPRO) 500 MG tablet Take 1 tablet (500 mg total) by mouth 2 (two) times daily. for 6 doses    ibuprofen (ADVIL,MOTRIN) 800 MG tablet Take 1 tablet (800 mg total) by mouth nightly as needed for Pain. (Patient not taking: Reported on 6/4/2020)    multivit-min-folic-vit K-lycop (ONE-A-DAY MEN'S 50 PLUS) 400- mcg Tab Take 1 tablet by mouth once daily.    [START ON 8/23/2020] polyethylene glycol (COLYTE) 240-22.72-6.72 -5.84 gram SolR Take 4,000 mLs (4 L total) by mouth once. for 1 dose (Patient not taking: Reported on 8/4/2020)    tamsulosin (FLOMAX) 0.4 mg Cap Take 1 capsule (0.4 mg total) by mouth once daily. (Patient not taking: Reported on 8/4/2020)    traZODone (DESYREL) 50 MG tablet Take 1 tablet (50 mg total) by mouth nightly as needed for Insomnia. (Patient not taking: Reported on 8/4/2020)     No current facility-administered medications for this visit.        Review of Systems   Constitutional: Negative for activity change, fatigue, fever and unexpected weight change.   HENT: Negative for congestion.    Eyes: Negative for redness.   Respiratory: Negative for chest tightness and shortness of breath.    Cardiovascular: Negative for  "chest pain and leg swelling.   Gastrointestinal: Negative for abdominal pain, constipation, diarrhea, nausea and vomiting.   Genitourinary: Negative for dysuria, flank pain, frequency, hematuria, penile pain, penile swelling, scrotal swelling, testicular pain and urgency.   Musculoskeletal: Negative for arthralgias and back pain.   Neurological: Negative for dizziness and light-headedness.   Psychiatric/Behavioral: Negative for behavioral problems and confusion. The patient is not nervous/anxious.    All other systems reviewed and are negative.      Objective:      Vitals:    08/04/20 1125   Temp: 98 °F (36.7 °C)   Weight: 107.9 kg (237 lb 14 oz)   Height: 6' 2" (1.88 m)     Physical Exam   Nursing note and vitals reviewed.  Constitutional: He is oriented to person, place, and time. He appears well-developed.   HENT:   Head: Normocephalic.   Eyes: Conjunctivae are normal.   Neck: Normal range of motion. No tracheal deviation present. No thyromegaly present.   Cardiovascular: Normal rate and normal heart sounds.    Pulmonary/Chest: Effort normal and breath sounds normal. No respiratory distress. He has no wheezes.   Abdominal: Soft. He exhibits no distension and no mass. There is no abdominal tenderness. There is no rebound and no guarding. No hernia. Hernia confirmed negative in the right inguinal area and confirmed negative in the left inguinal area.   Genitourinary:    Testes, penis and rectum normal.   Rectum:      No rectal mass, tenderness or external hemorrhoid.   Prostate is enlarged. Prostate is not tender. Right testis shows no mass and no tenderness. Left testis shows no mass and no tenderness.       Musculoskeletal: No tenderness.   Lymphadenopathy: No inguinal adenopathy noted on the right or left side.   Neurological: He is alert and oriented to person, place, and time.   Skin: Skin is warm and dry. No rash noted. No erythema.     Psychiatric: His behavior is normal. Judgment and thought content normal. "       Urine dipstick shows negative for all components.  Micro exam: negative for WBC's or RBC's.    Assessment:       1. Elevated PSA    2. BPH with obstruction/lower urinary tract symptoms    3. Nocturia more than twice per night          Plan:       1. Elevated PSA    - Ambulatory referral/consult to Urology  - ciprofloxacin HCl (CIPRO) 500 MG tablet; Take 1 tablet (500 mg total) by mouth 2 (two) times daily. for 6 doses  Dispense: 6 tablet; Refill: 0  - US Biopsy Prostate Singl Multi Specimens; Future    2. BPH with obstruction/lower urinary tract symptoms  He does not want prescription medication.  He will try [place]    3. Nocturia more than twice per night  Limit evening fluids            Follow up for Prostate Biopsy.

## 2020-08-14 DIAGNOSIS — Z11.59 NEED FOR HEPATITIS C SCREENING TEST: ICD-10-CM

## 2020-09-11 ENCOUNTER — TELEPHONE (OUTPATIENT)
Dept: UROLOGY | Facility: CLINIC | Age: 55
End: 2020-09-11

## 2020-09-11 NOTE — TELEPHONE ENCOUNTER
----- Message from Orestes Aggarwal sent at 9/11/2020 10:20 AM CDT -----  Regarding: call back/ question abt appt  Type: Patient Call Back    Who called:Chapo     What is the request in detail: The patient is requesting a call back from the staff. He has some questions to ask about his upcoming appt on Monday    Can the clinic reply by [facility]?no    Would the patient rather a call back or a response via [facility]? Call back    Best call back number:[phone]

## 2020-09-14 ENCOUNTER — PROCEDURE VISIT (OUTPATIENT)
Dept: UROLOGY | Facility: CLINIC | Age: 55
End: 2020-09-14
Attending: UROLOGY
Payer: COMMERCIAL

## 2020-09-14 DIAGNOSIS — R97.20 ELEVATED PSA: Primary | ICD-10-CM

## 2020-09-14 PROCEDURE — 76872 TRUS: ICD-10-PCS | Mod: S$GLB,,, | Performed by: UROLOGY

## 2020-09-14 PROCEDURE — 96372 PR INJECTION,THERAP/PROPH/DIAG2ST, IM OR SUBCUT: ICD-10-PCS | Mod: 59,S$GLB,, | Performed by: UROLOGY

## 2020-09-14 PROCEDURE — 55700 TRUS: ICD-10-PCS | Mod: S$GLB,,, | Performed by: UROLOGY

## 2020-09-14 PROCEDURE — 55700 TRUS: CPT | Mod: S$GLB,,, | Performed by: UROLOGY

## 2020-09-14 PROCEDURE — 96372 THER/PROPH/DIAG INJ SC/IM: CPT | Mod: 59,S$GLB,, | Performed by: UROLOGY

## 2020-09-14 PROCEDURE — 88305 TISSUE EXAM BY PATHOLOGIST: CPT | Mod: 26,,, | Performed by: PATHOLOGY

## 2020-09-14 PROCEDURE — 88305 TISSUE EXAM BY PATHOLOGIST: CPT | Performed by: PATHOLOGY

## 2020-09-14 PROCEDURE — 76872 US TRANSRECTAL: CPT | Mod: S$GLB,,, | Performed by: UROLOGY

## 2020-09-14 PROCEDURE — 88305 TISSUE EXAM BY PATHOLOGIST: ICD-10-PCS | Mod: 26,,, | Performed by: PATHOLOGY

## 2020-09-14 RX ORDER — GENTAMICIN SULFATE 40 MG/ML
80 INJECTION, SOLUTION INTRAMUSCULAR; INTRAVENOUS
Status: COMPLETED | OUTPATIENT
Start: 2020-09-14 | End: 2020-09-14

## 2020-09-14 RX ADMIN — GENTAMICIN SULFATE 80 MG: 40 INJECTION, SOLUTION INTRAMUSCULAR; INTRAVENOUS at 01:09

## 2020-09-14 NOTE — PROCEDURES
"TRUS    Date/Time: 9/14/2020 1:00 PM  Performed by: BOOGIE Hammond MD  Authorized by: BOOGIE Hammond MD     Consent Done?:  Yes (Written)  Time out: Immediately prior to procedure a "time out" was called to verify the correct patient, procedure, equipment, support staff and site/side marked as required.    Indications: Elevated PSA    Preparation: Patient was prepped and draped in usual sterile fashion    Position:  Left lateral  Anesthesia:  10cc's 1% Lidocaine and Lidocaine jelly  Patient sedated: No    Prostate Size:  43 g  Lesions:: No    Left Base Biopsies: 2  Left Mid Biopsies: 2  Left [place] Biopsies: 2  Right Base Biopsies: 2  Right Mid Biopsies: 2  Right [place] Biopsies: 2  Transitional zone: No    Total Biopsies:  12    Patient tolerance:  Patient tolerated the procedure well with no immediate complications     PSA 5.1      "

## 2020-09-21 LAB
FINAL PATHOLOGIC DIAGNOSIS: NORMAL
GROSS: NORMAL

## 2020-09-27 DIAGNOSIS — I10 ESSENTIAL HYPERTENSION: ICD-10-CM

## 2020-09-27 RX ORDER — AMLODIPINE BESYLATE 5 MG/1
TABLET ORAL
Qty: 90 TABLET | Refills: 12 | Status: SHIPPED | OUTPATIENT
Start: 2020-09-27 | End: 2021-08-25

## 2020-09-28 ENCOUNTER — OFFICE VISIT (OUTPATIENT)
Dept: UROLOGY | Facility: CLINIC | Age: 55
End: 2020-09-28
Payer: COMMERCIAL

## 2020-09-28 VITALS — TEMPERATURE: 99 F | WEIGHT: 241.63 LBS | BODY MASS INDEX: 31.01 KG/M2 | HEIGHT: 74 IN

## 2020-09-28 DIAGNOSIS — R35.1 NOCTURIA MORE THAN TWICE PER NIGHT: ICD-10-CM

## 2020-09-28 DIAGNOSIS — C61 PROSTATE CANCER: Primary | ICD-10-CM

## 2020-09-28 LAB
BILIRUB SERPL-MCNC: NORMAL MG/DL
BLOOD URINE, POC: NORMAL
COLOR, POC UA: YELLOW
GLUCOSE UR QL STRIP: NORMAL
KETONES UR QL STRIP: NORMAL
LEUKOCYTE ESTERASE URINE, POC: NORMAL
NITRITE, POC UA: NORMAL
PH, POC UA: 8
PROTEIN, POC: NORMAL
SPECIFIC GRAVITY, POC UA: 1000
UROBILINOGEN, POC UA: NORMAL

## 2020-09-28 PROCEDURE — 99214 OFFICE O/P EST MOD 30 MIN: CPT | Mod: 25,S$GLB,, | Performed by: UROLOGY

## 2020-09-28 PROCEDURE — 81001 URINALYSIS AUTO W/SCOPE: CPT | Mod: S$GLB,,, | Performed by: UROLOGY

## 2020-09-28 PROCEDURE — 99214 PR OFFICE/OUTPT VISIT, EST, LEVL IV, 30-39 MIN: ICD-10-PCS | Mod: 25,S$GLB,, | Performed by: UROLOGY

## 2020-09-28 PROCEDURE — 81001 POCT URINALYSIS, DIPSTICK OR TABLET REAGENT, AUTOMATED, WITH MICROSCOP: ICD-10-PCS | Mod: S$GLB,,, | Performed by: UROLOGY

## 2020-09-28 PROCEDURE — 99999 PR PBB SHADOW E&M-EST. PATIENT-LVL III: ICD-10-PCS | Mod: PBBFAC,,, | Performed by: UROLOGY

## 2020-09-28 PROCEDURE — 99999 PR PBB SHADOW E&M-EST. PATIENT-LVL III: CPT | Mod: PBBFAC,,, | Performed by: UROLOGY

## 2020-09-28 NOTE — PROGRESS NOTES
Subjective:       Patient ID: Chapo Ohara is a 55 y.o. male who was last seen in this office 9/14/2020    Chief Complaint:   Chief Complaint   Patient presents with    Post-op Evaluation     post op from trus/bx     Follow Up Prostate Biopsy  He underwent a prostate needle biopsy on 9/14/2020.  His biopsy was indicated due to: Elevated PSA.  Afterwards he experienced: Gross Hematuria, Dysuria, Blood in stool and Hematospermia.  These symptoms have not resolved.  His PSA prior to biopsy was 5.1.  His prostate size was 49 grams.  The ultrasound did not show a median lobe.  He currently does not have erectile dysfunction.  His pathology showed: prostate cancer.        ACTIVE MEDICAL ISSUES:  Patient Active Problem List   Diagnosis    Sleep apnea    Hyperlipidemia    HTN (hypertension)    Benign prostatic hyperplasia with urinary frequency    Cardiac murmur    History of colonic polyps    Other insomnia       ALLERGIES AND MEDICATIONS: updated and reviewed.  Review of patient's allergies indicates:  No Known Allergies  Current Outpatient Medications   Medication Sig    amLODIPine (NORVASC) 5 MG tablet TAKE 1 TABLET(5 MG) BY MOUTH EVERY DAY    traZODone (DESYREL) 50 MG tablet Take 1 tablet (50 mg total) by mouth nightly as needed for Insomnia.    atorvastatin (LIPITOR) 40 MG tablet Take 1 tablet (40 mg total) by mouth once daily. (Patient not taking: Reported on 9/28/2020)    ibuprofen (ADVIL,MOTRIN) 800 MG tablet Take 1 tablet (800 mg total) by mouth nightly as needed for Pain. (Patient not taking: Reported on 9/28/2020)    multivit-min-folic-vit K-lycop (ONE-A-DAY MEN'S 50 PLUS) 400- mcg Tab Take 1 tablet by mouth once daily.    tamsulosin (FLOMAX) 0.4 mg Cap Take 1 capsule (0.4 mg total) by mouth once daily. (Patient not taking: Reported on 9/28/2020)     No current facility-administered medications for this visit.        Review of Systems   Constitutional: Negative for activity change, fatigue,  "fever and unexpected weight change.   HENT: Negative for congestion.    Eyes: Negative for redness.   Respiratory: Negative for chest tightness and shortness of breath.    Cardiovascular: Negative for chest pain and leg swelling.   Gastrointestinal: Negative for abdominal pain, constipation, diarrhea, nausea and vomiting.   Genitourinary: Negative for dysuria, flank pain, frequency, hematuria, penile pain, penile swelling, scrotal swelling, testicular pain and urgency.   Musculoskeletal: Negative for arthralgias and back pain.   Neurological: Negative for dizziness and light-headedness.   Psychiatric/Behavioral: Negative for behavioral problems and confusion. The patient is not nervous/anxious.    All other systems reviewed and are negative.      Objective:      Vitals:    09/28/20 1420   Temp: 98.6 °F (37 °C)   Weight: 109.6 kg (241 lb 10 oz)   Height: 6' 2" (1.88 m)     Physical Exam   Nursing note and vitals reviewed.  Constitutional: He is oriented to person, place, and time. He appears well-developed.   HENT:   Head: Normocephalic.   Eyes: Conjunctivae are normal.   Neck: Normal range of motion. Neck supple. No tracheal deviation present. No thyromegaly present.   Cardiovascular: Normal rate and normal heart sounds.    Pulmonary/Chest: Effort normal and breath sounds normal. No respiratory distress. He has no wheezes.   Abdominal: Soft. Bowel sounds are normal. There is no abdominal tenderness. There is no rebound. No hernia.   Musculoskeletal: Normal range of motion. No tenderness.   Lymphadenopathy:     He has no cervical adenopathy.   Neurological: He is alert and oriented to person, place, and time.   Skin: Skin is warm and dry. No rash noted. No erythema.     Psychiatric: His behavior is normal. Judgment and thought content normal.       Urine dipstick shows negative for all components.  Micro exam: negative for WBC's or RBC's.    Part 1   Prostate needle biopsy (1, submitted as left base lateral): "   -Adenocarcinoma, [place] patterns 3-3, pattern score 6, group 1, occupying   10% of a 12 mm biopsy   Part 2   Prostate needle biopsy (1, submitted as left base medial):   -Adenocarcinoma, [place] patterns 3-3, pattern score 6, group 1, occupying   18% of a 15 mm biopsy   Part 3   Prostate needle biopsy (1, submitted as left mid lateral):   -Adenocarcinoma, [place] patterns 3-3, pattern score 6, group 1, occupying   less than 1% of a 13 mm biopsy   Part 4   Prostate needle biopsy (1, submitted as left mid medial):   -Adenocarcinoma, [place] patterns 3-3, pattern score 6, group 1, occupying   10% of a 15 mm biopsy   Part 5   Prostate needle biopsy (1, submitted as left apex lateral):   -Adenocarcinoma, [place] patterns 3-3, pattern score 6, group 1, occupying   12% of a 14 mm biopsy   Part 6   Prostate needle biopsy (1, submitted as left apex medial):   -Adenocarcinoma, [place] patterns 3-3, pattern score 6, group 1, occupying 3%   of a 12 mm biopsy   Part 7   Prostate needle biopsy (1, submitted as right base lateral):   -No evidence of malignancy   Part 8   Prostate needle biopsy (1, submitted as right base medial):   -No evidence of malignancy   Part 9   Prostate needle biopsy (1, submitted as right mid lateral):   -No evidence of malignancy   Part 10   Prostate needle biopsy (1, submitted as right mid medial):   -No evidence of malignancy   Part 11   Prostate needle biopsy (1, submitted as right apex lateral):   -No evidence of malignancy   Part 12   Prostate needle biopsy (1, submitted as right apex medial):   -No evidence of malignancy    Assessment:       1. Prostate cancer    2. Nocturia more than twice per night          Plan:       1. Prostate cancer  We discussed options: active surveillance, surgery, and XRT.  He should not need ADT given his number.    - POCT urinalysis, dipstick or tablet reag    2. Nocturia more than twice per night              Follow up in about 2 weeks (around 10/12/2020) for  Follow up.

## 2020-10-15 ENCOUNTER — OFFICE VISIT (OUTPATIENT)
Dept: UROLOGY | Facility: CLINIC | Age: 55
End: 2020-10-15
Payer: COMMERCIAL

## 2020-10-15 VITALS — HEIGHT: 74 IN | WEIGHT: 241.63 LBS | BODY MASS INDEX: 31.01 KG/M2

## 2020-10-15 DIAGNOSIS — R35.1 NOCTURIA MORE THAN TWICE PER NIGHT: ICD-10-CM

## 2020-10-15 DIAGNOSIS — C61 PROSTATE CANCER: Primary | ICD-10-CM

## 2020-10-15 PROCEDURE — 99999 PR PBB SHADOW E&M-EST. PATIENT-LVL IV: ICD-10-PCS | Mod: PBBFAC,,, | Performed by: UROLOGY

## 2020-10-15 PROCEDURE — 99214 OFFICE O/P EST MOD 30 MIN: CPT | Mod: S$PBB,,, | Performed by: UROLOGY

## 2020-10-15 PROCEDURE — 99214 PR OFFICE/OUTPT VISIT, EST, LEVL IV, 30-39 MIN: ICD-10-PCS | Mod: S$PBB,,, | Performed by: UROLOGY

## 2020-10-15 PROCEDURE — 99999 PR PBB SHADOW E&M-EST. PATIENT-LVL IV: CPT | Mod: PBBFAC,,, | Performed by: UROLOGY

## 2020-10-15 NOTE — PROGRESS NOTES
Subjective:       Patient ID: Chapo Ohara is a 55 y.o. male who was last seen in this office 9/14/2020    Chief Complaint:   Chief Complaint   Patient presents with    Follow-up     pt here to discuss treatment     Follow Up Prostate Biopsy  He underwent a prostate needle biopsy on 9/14/2020.  His biopsy was indicated due to: Elevated PSA.  Afterwards he experienced: Gross Hematuria, Dysuria, Blood in stool and Hematospermia.  These symptoms have not resolved.  His PSA prior to biopsy was 5.1.  His prostate size was 49 grams.  The ultrasound did not show a median lobe.  He currently does not have erectile dysfunction.  His pathology showed: prostate cancer.        ACTIVE MEDICAL ISSUES:  Patient Active Problem List   Diagnosis    Sleep apnea    Hyperlipidemia    HTN (hypertension)    Benign prostatic hyperplasia with urinary frequency    Cardiac murmur    History of colonic polyps    Other insomnia     Past Medical History:   Diagnosis Date    History of colonic polyps 4/11/2017    HTN (hypertension) 3/7/2014    Hyperlipidemia 3/7/2014    Sleep apnea 9/10/2012     Past Surgical History:   Procedure Laterality Date    COLONOSCOPY N/A 12/11/2015    Procedure: COLONOSCOPY;  Surgeon: Balta Farah MD;  Location: [facility];  Service: Endoscopy;  Laterality: N/A;    trus/bx 2020           ALLERGIES AND MEDICATIONS: updated and reviewed.  Review of patient's allergies indicates:  No Known Allergies  Current Outpatient Medications   Medication Sig    amLODIPine (NORVASC) 5 MG tablet TAKE 1 TABLET(5 MG) BY MOUTH EVERY DAY    traZODone (DESYREL) 50 MG tablet Take 1 tablet (50 mg total) by mouth nightly as needed for Insomnia.    atorvastatin (LIPITOR) 40 MG tablet Take 1 tablet (40 mg total) by mouth once daily. (Patient not taking: Reported on 9/28/2020)    ibuprofen (ADVIL,MOTRIN) 800 MG tablet Take 1 tablet (800 mg total) by mouth nightly as needed for Pain. (Patient not taking: Reported on 9/28/2020)  "   multivit-min-folic-vit K-lycop (ONE-A-DAY MEN'S 50 PLUS) 400- mcg Tab Take 1 tablet by mouth once daily.    tamsulosin (FLOMAX) 0.4 mg Cap Take 1 capsule (0.4 mg total) by mouth once daily. (Patient not taking: Reported on 9/28/2020)     No current facility-administered medications for this visit.        Review of Systems   Constitutional: Negative for activity change, fatigue, fever and unexpected weight change.   HENT: Negative for congestion.    Eyes: Negative for redness.   Respiratory: Negative for chest tightness and shortness of breath.    Cardiovascular: Negative for chest pain and leg swelling.   Gastrointestinal: Negative for abdominal pain, constipation, diarrhea, nausea and vomiting.   Genitourinary: Negative for dysuria, flank pain, frequency, hematuria, penile pain, penile swelling, scrotal swelling, testicular pain and urgency.   Musculoskeletal: Negative for arthralgias and back pain.   Neurological: Negative for dizziness and light-headedness.   Psychiatric/Behavioral: Negative for behavioral problems and confusion. The patient is not nervous/anxious.    All other systems reviewed and are negative.      Objective:      Vitals:    10/15/20 1115   Weight: 109.6 kg (241 lb 10 oz)   Height: 6' 2" (1.88 m)     Physical Exam   Nursing note and vitals reviewed.  Constitutional: He is oriented to person, place, and time. He appears well-developed.   HENT:   Head: Normocephalic.   Eyes: Conjunctivae are normal.   Neck: Normal range of motion. Neck supple. No tracheal deviation present. No thyromegaly present.   Cardiovascular: Normal rate and normal heart sounds.    Pulmonary/Chest: Effort normal and breath sounds normal. No respiratory distress. He has no wheezes.   Abdominal: Soft. Bowel sounds are normal. There is no abdominal tenderness. There is no rebound. No hernia.   Musculoskeletal: Normal range of motion. No tenderness.   Lymphadenopathy:     He has no cervical adenopathy. "   Neurological: He is alert and oriented to person, place, and time.   Skin: Skin is warm and dry. No rash noted. No erythema.     Psychiatric: His behavior is normal. Judgment and thought content normal.       Urine dipstick shows negative for all components.  Micro exam: negative for WBC's or RBC's.    Part 1   Prostate needle biopsy (1, submitted as left base lateral):   -Adenocarcinoma, [place] patterns 3-3, pattern score 6, group 1, occupying   10% of a 12 mm biopsy   Part 2   Prostate needle biopsy (1, submitted as left base medial):   -Adenocarcinoma, [place] patterns 3-3, pattern score 6, group 1, occupying   18% of a 15 mm biopsy   Part 3   Prostate needle biopsy (1, submitted as left mid lateral):   -Adenocarcinoma, [place] patterns 3-3, pattern score 6, group 1, occupying   less than 1% of a 13 mm biopsy   Part 4   Prostate needle biopsy (1, submitted as left mid medial):   -Adenocarcinoma, [place] patterns 3-3, pattern score 6, group 1, occupying   10% of a 15 mm biopsy   Part 5   Prostate needle biopsy (1, submitted as left apex lateral):   -Adenocarcinoma, [place] patterns 3-3, pattern score 6, group 1, occupying   12% of a 14 mm biopsy   Part 6   Prostate needle biopsy (1, submitted as left apex medial):   -Adenocarcinoma, [place] patterns 3-3, pattern score 6, group 1, occupying 3%   of a 12 mm biopsy   Part 7   Prostate needle biopsy (1, submitted as right base lateral):   -No evidence of malignancy   Part 8   Prostate needle biopsy (1, submitted as right base medial):   -No evidence of malignancy   Part 9   Prostate needle biopsy (1, submitted as right mid lateral):   -No evidence of malignancy   Part 10   Prostate needle biopsy (1, submitted as right mid medial):   -No evidence of malignancy   Part 11   Prostate needle biopsy (1, submitted as right apex lateral):   -No evidence of malignancy   Part 12   Prostate needle biopsy (1, submitted as right apex medial):   -No evidence of  malignancy    Assessment:       1. Prostate cancer    2. Nocturia more than twice per night          Plan:       1. Prostate cancer  Plan for a Robotic Assisted Laparoscopic Prostatectomy on Wednesday 11/11/2020    We discussed his grade and stage of disease, life expectancy and active surveillance vs treatment.  We discussed the roles of surgery, radiation (external and brachytherapy), HIFU and Cryosurgery, hormonal therapy and chemotherapy in the treatment of prostate cancer.  We discussed brachytherapy and external radiation therapy and open and robotic surgery.  We discussed the risks and benefits of each. We discussed erectile dysfunction, urinary incontinence and bowel issues.  We discussed referral to radiation oncology to further discuss options.  I answered his questions.      - POCT urinalysis, dipstick or tablet reag    2. Nocturia more than twice per night              No follow-ups on file.

## 2020-10-15 NOTE — H&P
Subjective:       Patient ID: Chapo Ohara is a 55 y.o. male who was last seen in this office 9/14/2020    Chief Complaint:   Chief Complaint   Patient presents with    Follow-up     pt here to discuss treatment     Follow Up Prostate Biopsy  He underwent a prostate needle biopsy on 9/14/2020.  His biopsy was indicated due to: Elevated PSA.  Afterwards he experienced: Gross Hematuria, Dysuria, Blood in stool and Hematospermia.  These symptoms have not resolved.  His PSA prior to biopsy was 5.1.  His prostate size was 49 grams.  The ultrasound did not show a median lobe.  He currently does not have erectile dysfunction.  His pathology showed: prostate cancer.        ACTIVE MEDICAL ISSUES:  Patient Active Problem List   Diagnosis    Sleep apnea    Hyperlipidemia    HTN (hypertension)    Benign prostatic hyperplasia with urinary frequency    Cardiac murmur    History of colonic polyps    Other insomnia     Past Medical History:   Diagnosis Date    History of colonic polyps 4/11/2017    HTN (hypertension) 3/7/2014    Hyperlipidemia 3/7/2014    Sleep apnea 9/10/2012     Past Surgical History:   Procedure Laterality Date    COLONOSCOPY N/A 12/11/2015    Procedure: COLONOSCOPY;  Surgeon: Balta Farah MD;  Location: [facility];  Service: Endoscopy;  Laterality: N/A;    trus/bx 2020           ALLERGIES AND MEDICATIONS: updated and reviewed.  Review of patient's allergies indicates:  No Known Allergies  Current Outpatient Medications   Medication Sig    amLODIPine (NORVASC) 5 MG tablet TAKE 1 TABLET(5 MG) BY MOUTH EVERY DAY    traZODone (DESYREL) 50 MG tablet Take 1 tablet (50 mg total) by mouth nightly as needed for Insomnia.    atorvastatin (LIPITOR) 40 MG tablet Take 1 tablet (40 mg total) by mouth once daily. (Patient not taking: Reported on 9/28/2020)    ibuprofen (ADVIL,MOTRIN) 800 MG tablet Take 1 tablet (800 mg total) by mouth nightly as needed for Pain. (Patient not taking: Reported on 9/28/2020)  "   multivit-min-folic-vit K-lycop (ONE-A-DAY MEN'S 50 PLUS) 400- mcg Tab Take 1 tablet by mouth once daily.    tamsulosin (FLOMAX) 0.4 mg Cap Take 1 capsule (0.4 mg total) by mouth once daily. (Patient not taking: Reported on 9/28/2020)     No current facility-administered medications for this visit.        Review of Systems   Constitutional: Negative for activity change, fatigue, fever and unexpected weight change.   HENT: Negative for congestion.    Eyes: Negative for redness.   Respiratory: Negative for chest tightness and shortness of breath.    Cardiovascular: Negative for chest pain and leg swelling.   Gastrointestinal: Negative for abdominal pain, constipation, diarrhea, nausea and vomiting.   Genitourinary: Negative for dysuria, flank pain, frequency, hematuria, penile pain, penile swelling, scrotal swelling, testicular pain and urgency.   Musculoskeletal: Negative for arthralgias and back pain.   Neurological: Negative for dizziness and light-headedness.   Psychiatric/Behavioral: Negative for behavioral problems and confusion. The patient is not nervous/anxious.    All other systems reviewed and are negative.      Objective:      Vitals:    10/15/20 1115   Weight: 109.6 kg (241 lb 10 oz)   Height: 6' 2" (1.88 m)     Physical Exam   Nursing note and vitals reviewed.  Constitutional: He is oriented to person, place, and time. He appears well-developed.   HENT:   Head: Normocephalic.   Eyes: Conjunctivae are normal.   Neck: Normal range of motion. Neck supple. No tracheal deviation present. No thyromegaly present.   Cardiovascular: Normal rate and normal heart sounds.    Pulmonary/Chest: Effort normal and breath sounds normal. No respiratory distress. He has no wheezes.   Abdominal: Soft. Bowel sounds are normal. There is no abdominal tenderness. There is no rebound. No hernia.   Musculoskeletal: Normal range of motion. No tenderness.   Lymphadenopathy:     He has no cervical adenopathy. "   Neurological: He is alert and oriented to person, place, and time.   Skin: Skin is warm and dry. No rash noted. No erythema.     Psychiatric: His behavior is normal. Judgment and thought content normal.       Urine dipstick shows negative for all components.  Micro exam: negative for WBC's or RBC's.    Part 1   Prostate needle biopsy (1, submitted as left base lateral):   -Adenocarcinoma, [place] patterns 3-3, pattern score 6, group 1, occupying   10% of a 12 mm biopsy   Part 2   Prostate needle biopsy (1, submitted as left base medial):   -Adenocarcinoma, [place] patterns 3-3, pattern score 6, group 1, occupying   18% of a 15 mm biopsy   Part 3   Prostate needle biopsy (1, submitted as left mid lateral):   -Adenocarcinoma, [place] patterns 3-3, pattern score 6, group 1, occupying   less than 1% of a 13 mm biopsy   Part 4   Prostate needle biopsy (1, submitted as left mid medial):   -Adenocarcinoma, [place] patterns 3-3, pattern score 6, group 1, occupying   10% of a 15 mm biopsy   Part 5   Prostate needle biopsy (1, submitted as left apex lateral):   -Adenocarcinoma, [place] patterns 3-3, pattern score 6, group 1, occupying   12% of a 14 mm biopsy   Part 6   Prostate needle biopsy (1, submitted as left apex medial):   -Adenocarcinoma, [place] patterns 3-3, pattern score 6, group 1, occupying 3%   of a 12 mm biopsy   Part 7   Prostate needle biopsy (1, submitted as right base lateral):   -No evidence of malignancy   Part 8   Prostate needle biopsy (1, submitted as right base medial):   -No evidence of malignancy   Part 9   Prostate needle biopsy (1, submitted as right mid lateral):   -No evidence of malignancy   Part 10   Prostate needle biopsy (1, submitted as right mid medial):   -No evidence of malignancy   Part 11   Prostate needle biopsy (1, submitted as right apex lateral):   -No evidence of malignancy   Part 12   Prostate needle biopsy (1, submitted as right apex medial):   -No evidence of  malignancy    Assessment:       1. Prostate cancer    2. Nocturia more than twice per night          Plan:       1. Prostate cancer  Plan for a Robotic Assisted Laparoscopic Prostatectomy on Wednesday 11/11/2020    We discussed his grade and stage of disease, life expectancy and active surveillance vs treatment.  We discussed the roles of surgery, radiation (external and brachytherapy), HIFU and Cryosurgery, hormonal therapy and chemotherapy in the treatment of prostate cancer.  We discussed brachytherapy and external radiation therapy and open and robotic surgery.  We discussed the risks and benefits of each. We discussed erectile dysfunction, urinary incontinence and bowel issues.  We discussed referral to radiation oncology to further discuss options.  I answered his questions.      - POCT urinalysis, dipstick or tablet reag    2. Nocturia more than twice per night              No follow-ups on file.

## 2020-10-15 NOTE — H&P (VIEW-ONLY)
Subjective:       Patient ID: Chapo Ohara is a 55 y.o. male who was last seen in this office 9/14/2020    Chief Complaint:   Chief Complaint   Patient presents with    Follow-up     pt here to discuss treatment     Follow Up Prostate Biopsy  He underwent a prostate needle biopsy on 9/14/2020.  His biopsy was indicated due to: Elevated PSA.  Afterwards he experienced: Gross Hematuria, Dysuria, Blood in stool and Hematospermia.  These symptoms have not resolved.  His PSA prior to biopsy was 5.1.  His prostate size was 49 grams.  The ultrasound did not show a median lobe.  He currently does not have erectile dysfunction.  His pathology showed: prostate cancer.        ACTIVE MEDICAL ISSUES:  Patient Active Problem List   Diagnosis    Sleep apnea    Hyperlipidemia    HTN (hypertension)    Benign prostatic hyperplasia with urinary frequency    Cardiac murmur    History of colonic polyps    Other insomnia     Past Medical History:   Diagnosis Date    History of colonic polyps 4/11/2017    HTN (hypertension) 3/7/2014    Hyperlipidemia 3/7/2014    Sleep apnea 9/10/2012     Past Surgical History:   Procedure Laterality Date    COLONOSCOPY N/A 12/11/2015    Procedure: COLONOSCOPY;  Surgeon: Balta Farah MD;  Location: [facility];  Service: Endoscopy;  Laterality: N/A;    trus/bx 2020           ALLERGIES AND MEDICATIONS: updated and reviewed.  Review of patient's allergies indicates:  No Known Allergies  Current Outpatient Medications   Medication Sig    amLODIPine (NORVASC) 5 MG tablet TAKE 1 TABLET(5 MG) BY MOUTH EVERY DAY    traZODone (DESYREL) 50 MG tablet Take 1 tablet (50 mg total) by mouth nightly as needed for Insomnia.    atorvastatin (LIPITOR) 40 MG tablet Take 1 tablet (40 mg total) by mouth once daily. (Patient not taking: Reported on 9/28/2020)    ibuprofen (ADVIL,MOTRIN) 800 MG tablet Take 1 tablet (800 mg total) by mouth nightly as needed for Pain. (Patient not taking: Reported on 9/28/2020)  "   multivit-min-folic-vit K-lycop (ONE-A-DAY MEN'S 50 PLUS) 400- mcg Tab Take 1 tablet by mouth once daily.    tamsulosin (FLOMAX) 0.4 mg Cap Take 1 capsule (0.4 mg total) by mouth once daily. (Patient not taking: Reported on 9/28/2020)     No current facility-administered medications for this visit.        Review of Systems   Constitutional: Negative for activity change, fatigue, fever and unexpected weight change.   HENT: Negative for congestion.    Eyes: Negative for redness.   Respiratory: Negative for chest tightness and shortness of breath.    Cardiovascular: Negative for chest pain and leg swelling.   Gastrointestinal: Negative for abdominal pain, constipation, diarrhea, nausea and vomiting.   Genitourinary: Negative for dysuria, flank pain, frequency, hematuria, penile pain, penile swelling, scrotal swelling, testicular pain and urgency.   Musculoskeletal: Negative for arthralgias and back pain.   Neurological: Negative for dizziness and light-headedness.   Psychiatric/Behavioral: Negative for behavioral problems and confusion. The patient is not nervous/anxious.    All other systems reviewed and are negative.      Objective:      Vitals:    10/15/20 1115   Weight: 109.6 kg (241 lb 10 oz)   Height: 6' 2" (1.88 m)     Physical Exam   Nursing note and vitals reviewed.  Constitutional: He is oriented to person, place, and time. He appears well-developed.   HENT:   Head: Normocephalic.   Eyes: Conjunctivae are normal.   Neck: Normal range of motion. Neck supple. No tracheal deviation present. No thyromegaly present.   Cardiovascular: Normal rate and normal heart sounds.    Pulmonary/Chest: Effort normal and breath sounds normal. No respiratory distress. He has no wheezes.   Abdominal: Soft. Bowel sounds are normal. There is no abdominal tenderness. There is no rebound. No hernia.   Musculoskeletal: Normal range of motion. No tenderness.   Lymphadenopathy:     He has no cervical adenopathy. "   Neurological: He is alert and oriented to person, place, and time.   Skin: Skin is warm and dry. No rash noted. No erythema.     Psychiatric: His behavior is normal. Judgment and thought content normal.       Urine dipstick shows negative for all components.  Micro exam: negative for WBC's or RBC's.    Part 1   Prostate needle biopsy (1, submitted as left base lateral):   -Adenocarcinoma, [place] patterns 3-3, pattern score 6, group 1, occupying   10% of a 12 mm biopsy   Part 2   Prostate needle biopsy (1, submitted as left base medial):   -Adenocarcinoma, [place] patterns 3-3, pattern score 6, group 1, occupying   18% of a 15 mm biopsy   Part 3   Prostate needle biopsy (1, submitted as left mid lateral):   -Adenocarcinoma, [place] patterns 3-3, pattern score 6, group 1, occupying   less than 1% of a 13 mm biopsy   Part 4   Prostate needle biopsy (1, submitted as left mid medial):   -Adenocarcinoma, [place] patterns 3-3, pattern score 6, group 1, occupying   10% of a 15 mm biopsy   Part 5   Prostate needle biopsy (1, submitted as left apex lateral):   -Adenocarcinoma, [place] patterns 3-3, pattern score 6, group 1, occupying   12% of a 14 mm biopsy   Part 6   Prostate needle biopsy (1, submitted as left apex medial):   -Adenocarcinoma, [place] patterns 3-3, pattern score 6, group 1, occupying 3%   of a 12 mm biopsy   Part 7   Prostate needle biopsy (1, submitted as right base lateral):   -No evidence of malignancy   Part 8   Prostate needle biopsy (1, submitted as right base medial):   -No evidence of malignancy   Part 9   Prostate needle biopsy (1, submitted as right mid lateral):   -No evidence of malignancy   Part 10   Prostate needle biopsy (1, submitted as right mid medial):   -No evidence of malignancy   Part 11   Prostate needle biopsy (1, submitted as right apex lateral):   -No evidence of malignancy   Part 12   Prostate needle biopsy (1, submitted as right apex medial):   -No evidence of  malignancy    Assessment:       1. Prostate cancer    2. Nocturia more than twice per night          Plan:       1. Prostate cancer  Plan for a Robotic Assisted Laparoscopic Prostatectomy on Wednesday 11/11/2020    We discussed his grade and stage of disease, life expectancy and active surveillance vs treatment.  We discussed the roles of surgery, radiation (external and brachytherapy), HIFU and Cryosurgery, hormonal therapy and chemotherapy in the treatment of prostate cancer.  We discussed brachytherapy and external radiation therapy and open and robotic surgery.  We discussed the risks and benefits of each. We discussed erectile dysfunction, urinary incontinence and bowel issues.  We discussed referral to radiation oncology to further discuss options.  I answered his questions.      - POCT urinalysis, dipstick or tablet reag    2. Nocturia more than twice per night              No follow-ups on file.

## 2020-11-06 ENCOUNTER — HOSPITAL ENCOUNTER (OUTPATIENT)
Dept: PREADMISSION TESTING | Facility: OTHER | Age: 55
Discharge: HOME | End: 2020-11-06
Attending: UROLOGY
Payer: COMMERCIAL

## 2020-11-06 VITALS
HEIGHT: 74 IN | BODY MASS INDEX: 30.16 KG/M2 | BODY MASS INDEX: 30.16 KG/M2 | OXYGEN SATURATION: 98 % | DIASTOLIC BLOOD PRESSURE: 88 MMHG | SYSTOLIC BLOOD PRESSURE: 151 MMHG | TEMPERATURE: 97 F | HEIGHT: 74 IN | WEIGHT: 235 LBS | RESPIRATION RATE: 16 BRPM | WEIGHT: 235 LBS | HEART RATE: 78 BPM

## 2020-11-06 DIAGNOSIS — C61 PROSTATE CANCER: ICD-10-CM

## 2020-11-06 LAB
ABO + RH BLD: NORMAL
ANION GAP SERPL CALC-SCNC: 8 MMOL/L (ref 8–16)
BASOPHILS # BLD AUTO: 0.04 K/UL (ref 0–0.2)
BASOPHILS NFR BLD: 0.8 % (ref 0–1.9)
BLD GP AB SCN CELLS X3 SERPL QL: NORMAL
BUN SERPL-MCNC: 12 MG/DL (ref 6–20)
CALCIUM SERPL-MCNC: 9.6 MG/DL (ref 8.7–10.5)
CHLORIDE SERPL-SCNC: 102 MMOL/L (ref 95–110)
CO2 SERPL-SCNC: 28 MMOL/L (ref 23–29)
CREAT SERPL-MCNC: 1.1 MG/DL (ref 0.5–1.4)
DIFFERENTIAL METHOD: ABNORMAL
EOSINOPHIL # BLD AUTO: 0.2 K/UL (ref 0–0.5)
EOSINOPHIL NFR BLD: 4.1 % (ref 0–8)
ERYTHROCYTE [DISTWIDTH] IN BLOOD BY AUTOMATED COUNT: 14 % (ref 11.5–14.5)
EST. GFR  (AFRICAN AMERICAN): >60 ML/MIN/1.73 M^2
EST. GFR  (NON AFRICAN AMERICAN): >60 ML/MIN/1.73 M^2
GLUCOSE SERPL-MCNC: 75 MG/DL (ref 70–110)
HCT VFR BLD AUTO: 49.5 % (ref 40–54)
HGB BLD-MCNC: 15.8 G/DL (ref 14–18)
IMM GRANULOCYTES # BLD AUTO: 0 K/UL (ref 0–0.04)
IMM GRANULOCYTES NFR BLD AUTO: 0 % (ref 0–0.5)
LYMPHOCYTES # BLD AUTO: 1.1 K/UL (ref 1–4.8)
LYMPHOCYTES NFR BLD: 22.8 % (ref 18–48)
MCH RBC QN AUTO: 28.5 PG (ref 27–31)
MCHC RBC AUTO-ENTMCNC: 31.9 G/DL (ref 32–36)
MCV RBC AUTO: 89 FL (ref 82–98)
MONOCYTES # BLD AUTO: 0.5 K/UL (ref 0.3–1)
MONOCYTES NFR BLD: 10.8 % (ref 4–15)
NEUTROPHILS # BLD AUTO: 3 K/UL (ref 1.8–7.7)
NEUTROPHILS NFR BLD: 61.5 % (ref 38–73)
NRBC BLD-RTO: 0 /100 WBC
PLATELET # BLD AUTO: 268 K/UL (ref 150–350)
PMV BLD AUTO: 10.2 FL (ref 9.2–12.9)
POTASSIUM SERPL-SCNC: 4.5 MMOL/L (ref 3.5–5.1)
RBC # BLD AUTO: 5.55 M/UL (ref 4.6–6.2)
SODIUM SERPL-SCNC: 138 MMOL/L (ref 136–145)
WBC # BLD AUTO: 4.83 K/UL (ref 3.9–12.7)

## 2020-11-06 PROCEDURE — 93010 EKG 12-LEAD: ICD-10-PCS | Mod: ,,, | Performed by: INTERNAL MEDICINE

## 2020-11-06 PROCEDURE — 80048 BASIC METABOLIC PNL TOTAL CA: CPT

## 2020-11-06 PROCEDURE — 85025 COMPLETE CBC W/AUTO DIFF WBC: CPT

## 2020-11-06 PROCEDURE — 93005 ELECTROCARDIOGRAM TRACING: CPT

## 2020-11-06 PROCEDURE — 86901 BLOOD TYPING SEROLOGIC RH(D): CPT

## 2020-11-06 PROCEDURE — 93010 ELECTROCARDIOGRAM REPORT: CPT | Mod: ,,, | Performed by: INTERNAL MEDICINE

## 2020-11-06 PROCEDURE — 87086 URINE CULTURE/COLONY COUNT: CPT

## 2020-11-06 PROCEDURE — 36415 COLL VENOUS BLD VENIPUNCTURE: CPT

## 2020-11-06 RX ORDER — SODIUM CHLORIDE, SODIUM LACTATE, POTASSIUM CHLORIDE, CALCIUM CHLORIDE 600; 310; 30; 20 MG/100ML; MG/100ML; MG/100ML; MG/100ML
INJECTION, SOLUTION INTRAVENOUS CONTINUOUS
Status: CANCELLED | OUTPATIENT
Start: 2020-11-06

## 2020-11-06 RX ORDER — FAMOTIDINE 20 MG/1
20 TABLET, FILM COATED ORAL
Status: CANCELLED | OUTPATIENT
Start: 2020-11-06 | End: 2020-11-06

## 2020-11-06 RX ORDER — ACETAMINOPHEN 500 MG
1000 TABLET ORAL
Status: CANCELLED | OUTPATIENT
Start: 2020-11-06 | End: 2020-11-06

## 2020-11-06 RX ORDER — CELECOXIB 200 MG/1
400 CAPSULE ORAL
Status: CANCELLED | OUTPATIENT
Start: 2020-11-06 | End: 2020-11-06

## 2020-11-06 RX ORDER — LIDOCAINE HYDROCHLORIDE 10 MG/ML
0.5 INJECTION, SOLUTION EPIDURAL; INFILTRATION; INTRACAUDAL; PERINEURAL ONCE
Status: CANCELLED | OUTPATIENT
Start: 2020-11-06 | End: 2020-11-06

## 2020-11-06 NOTE — DISCHARGE INSTRUCTIONS
Information to Prepare you for your Surgery    PRE-ADMIT TESTING -  [phone]    [address]  [place] [facility]          Your surgery has been scheduled at [facility]. We are pleased to have the opportunity to serve you. For Further Information please call [phone].    On the day of surgery please report to the Information Desk on the 1st floor.    · CONTACT YOUR PHYSICIAN'S OFFICE THE DAY PRIOR TO YOUR SURGERY TO OBTAIN YOUR ARRIVAL TIME.     · The evening before surgery do not eat anything after 9 p.m. ( this includes hard candy, chewing gum and mints).  You may only have GATORADE, POWERADE AND WATER  from 9 p.m. until you leave your home.   DO NOT DRINK ANY LIQUIDS ON THE WAY TO THE HOSPITAL.      SPECIAL MEDICATION INSTRUCTIONS: TAKE medications checked off by the Anesthesiologist on your Medication List.    Angiogram Patients: Take medications as instructed by your physician, including aspirin.     Surgery Patients:    If you take ASPIRIN - Your PHYSICIAN/SURGEON will need to inform you IF/OR when you need to stop taking aspirin prior to your surgery.     Do Not take any medications containing IBUPROFEN.  Do Not Wear any make-up or dark nail polish   (especially eye make-up) to surgery. If you come to surgery with makeup on you will be required to remove the makeup or nail polish.    Do not shave your surgical area at least 5 days prior to your surgery. The surgical prep will be performed at the hospital according to Infection Control regulations.    Leave all valuables at home.   Do Not wear any jewelry or watches, including any metal in body piercings. Jewelry must be removed prior to coming to the hospital.  There is a possibility that rings that are unable to be removed may be cut off if they are on the surgical extremity.    Contact Lens must be removed before surgery. Either do not wear the contact lens or bring a case and solution for  storage.  Please bring a container for eyeglasses or dentures as required.  Bring any paperwork your physician has provided, such as consent forms,  history and physicals, doctor's orders, etc.   Bring comfortable clothes that are loose fitting to wear upon discharge. Take into consideration the type of surgery being performed.  Maintain your diet as advised per your physician the day prior to surgery.      Adequate rest the night before surgery is advised.   Park in the Parking lot behind the hospital or in the [facility] across the street from the parking lot. Parking is complimentary.  If you will be discharged the same day as your procedure, please arrange for a responsible adult to drive you home or to accompany you if traveling by taxi.   YOU WILL NOT BE PERMITTED TO DRIVE OR TO LEAVE THE HOSPITAL ALONE AFTER SURGERY.   If you are being discharged the same day, it is strongly recommended that you arrange for someone to remain with you for the first 24 hrs following your surgery.    The Surgeon will speak to your family/visitor after your surgery regarding the outcome of your surgery and post op care.  The Surgeon may speak to you after your surgery, but there is a possibility you may not remember the details.  Please check with your family members regarding the conversation with the Surgeon.    We strongly recommend whoever is bringing you home be present for discharge instructions.  This will ensure a thorough understanding for your post op home care.    ALL CHILDREN MUST ALWAYS BE ACCOMPANIED BY AN ADULT.    Visitors-Refer to current Visitor policy handouts.    Thank you for your cooperation.  The Staff of [facility].                Bathing Instructions with Hibiclens     Shower the evening before and morning of your procedure with Hibiclens:   Wash your face with water and your regular face wash/soap   Apply Hibiclens directly on your skin or on a wet washcloth and wash  gently. When showering: Move away from the shower stream when applying Hibiclens to avoid rinsing off too soon.   Rinse thoroughly with warm water   Do not dilute Hibiclens         Dry off as usual, do not use any deodorant, powder, body lotions, perfume, after shave or cologne.

## 2020-11-08 ENCOUNTER — LAB VISIT (OUTPATIENT)
Dept: URGENT CARE | Facility: CLINIC | Age: 55
End: 2020-11-08
Payer: COMMERCIAL

## 2020-11-08 DIAGNOSIS — Z12.11 COLON CANCER SCREENING: ICD-10-CM

## 2020-11-08 LAB
BACTERIA UR CULT: NO GROWTH
SARS-COV-2 RNA RESP QL NAA+PROBE: NOT DETECTED

## 2020-11-08 PROCEDURE — U0003 INFECTIOUS AGENT DETECTION BY NUCLEIC ACID (DNA OR RNA); SEVERE ACUTE RESPIRATORY SYNDROME CORONAVIRUS 2 (SARS-COV-2) (CORONAVIRUS DISEASE [COVID-19]), AMPLIFIED PROBE TECHNIQUE, MAKING USE OF HIGH THROUGHPUT TECHNOLOGIES AS DESCRIBED BY CMS-2020-01-R: HCPCS

## 2020-11-10 ENCOUNTER — TELEPHONE (OUTPATIENT)
Dept: UROLOGY | Facility: CLINIC | Age: 55
End: 2020-11-10

## 2020-11-11 ENCOUNTER — HOSPITAL ENCOUNTER (OUTPATIENT)
Facility: OTHER | Age: 55
LOS: 1 days | Discharge: HOME | End: 2020-11-11
Attending: UROLOGY | Admitting: UROLOGY

## 2020-11-11 DIAGNOSIS — C61 PROSTATE CANCER: ICD-10-CM

## 2020-11-11 LAB
BLD PROD TYP BPU: NORMAL
BLD PROD TYP BPU: NORMAL
BLOOD UNIT EXPIRATION DATE: NORMAL
BLOOD UNIT EXPIRATION DATE: NORMAL
BLOOD UNIT TYPE CODE: 5100
BLOOD UNIT TYPE CODE: 5100
BLOOD UNIT TYPE: NORMAL
BLOOD UNIT TYPE: NORMAL
CODING SYSTEM: NORMAL
CODING SYSTEM: NORMAL
DISPENSE STATUS: NORMAL
DISPENSE STATUS: NORMAL
TRANS ERYTHROCYTES VOL PATIENT: NORMAL ML
TRANS ERYTHROCYTES VOL PATIENT: NORMAL ML

## 2020-11-11 PROCEDURE — 86920 COMPATIBILITY TEST SPIN: CPT

## 2020-11-11 RX ORDER — OXYCODONE HYDROCHLORIDE 5 MG/1
5 TABLET ORAL
Status: CANCELLED | OUTPATIENT
Start: 2020-11-11

## 2020-11-11 RX ORDER — MEPERIDINE HYDROCHLORIDE 50 MG/ML
12.5 INJECTION INTRAMUSCULAR; INTRAVENOUS; SUBCUTANEOUS ONCE AS NEEDED
Status: CANCELLED | OUTPATIENT
Start: 2020-11-11 | End: 2020-11-12

## 2020-11-11 RX ORDER — LIDOCAINE HYDROCHLORIDE 10 MG/ML
0.5 INJECTION, SOLUTION EPIDURAL; INFILTRATION; INTRACAUDAL; PERINEURAL ONCE
Status: DISCONTINUED | OUTPATIENT
Start: 2020-11-11 | End: 2020-11-13 | Stop reason: HOSPADM

## 2020-11-11 RX ORDER — MIDAZOLAM HYDROCHLORIDE 1 MG/ML
5 INJECTION INTRAMUSCULAR; INTRAVENOUS ONCE AS NEEDED
Status: DISCONTINUED | OUTPATIENT
Start: 2020-11-11 | End: 2020-11-13 | Stop reason: HOSPADM

## 2020-11-11 RX ORDER — CELECOXIB 200 MG/1
400 CAPSULE ORAL
Status: DISCONTINUED | OUTPATIENT
Start: 2020-11-11 | End: 2020-11-11 | Stop reason: HOSPADM

## 2020-11-11 RX ORDER — CEFAZOLIN SODIUM 1 G/3ML
2 INJECTION, POWDER, FOR SOLUTION INTRAMUSCULAR; INTRAVENOUS
Status: DISCONTINUED | OUTPATIENT
Start: 2020-11-11 | End: 2020-11-13 | Stop reason: HOSPADM

## 2020-11-11 RX ORDER — SODIUM CHLORIDE 0.9 % (FLUSH) 0.9 %
3 SYRINGE (ML) INJECTION
Status: DISCONTINUED | OUTPATIENT
Start: 2020-11-11 | End: 2020-11-13 | Stop reason: HOSPADM

## 2020-11-11 RX ORDER — FENTANYL CITRATE 50 UG/ML
100 INJECTION, SOLUTION INTRAMUSCULAR; INTRAVENOUS EVERY 5 MIN PRN
Status: DISCONTINUED | OUTPATIENT
Start: 2020-11-11 | End: 2020-11-13 | Stop reason: HOSPADM

## 2020-11-11 RX ORDER — SODIUM CHLORIDE, SODIUM LACTATE, POTASSIUM CHLORIDE, CALCIUM CHLORIDE 600; 310; 30; 20 MG/100ML; MG/100ML; MG/100ML; MG/100ML
INJECTION, SOLUTION INTRAVENOUS CONTINUOUS
Status: DISCONTINUED | OUTPATIENT
Start: 2020-11-11 | End: 2020-11-13 | Stop reason: HOSPADM

## 2020-11-11 RX ORDER — DIPHENHYDRAMINE HYDROCHLORIDE 50 MG/ML
25 INJECTION INTRAMUSCULAR; INTRAVENOUS EVERY 6 HOURS PRN
Status: CANCELLED | OUTPATIENT
Start: 2020-11-11

## 2020-11-11 RX ORDER — FAMOTIDINE 20 MG/1
20 TABLET, FILM COATED ORAL
Status: DISCONTINUED | OUTPATIENT
Start: 2020-11-11 | End: 2020-11-11 | Stop reason: HOSPADM

## 2020-11-11 RX ORDER — ACETAMINOPHEN 500 MG
1000 TABLET ORAL
Status: DISCONTINUED | OUTPATIENT
Start: 2020-11-11 | End: 2020-11-11 | Stop reason: HOSPADM

## 2020-11-11 RX ORDER — ONDANSETRON 2 MG/ML
4 INJECTION INTRAMUSCULAR; INTRAVENOUS DAILY PRN
Status: CANCELLED | OUTPATIENT
Start: 2020-11-11

## 2020-11-11 RX ORDER — HYDROMORPHONE HYDROCHLORIDE 2 MG/ML
0.4 INJECTION, SOLUTION INTRAMUSCULAR; INTRAVENOUS; SUBCUTANEOUS EVERY 5 MIN PRN
Status: CANCELLED | OUTPATIENT
Start: 2020-11-11

## 2020-11-12 ENCOUNTER — TELEPHONE (OUTPATIENT)
Dept: UROLOGY | Facility: CLINIC | Age: 55
End: 2020-11-12

## 2020-11-12 NOTE — TELEPHONE ENCOUNTER
Call returned to pt no answer.   Send letter to pt. Urine test positive for infection, but no bacterial growth on urine culture. Urine microalbumin is negligible. Thyroid level is normal, stable. Blood cells are normal. Laura Fajardo PA-C

## 2020-11-12 NOTE — TELEPHONE ENCOUNTER
----- Message from Nicol Rice sent at 11/12/2020 11:12 AM CST -----  Regarding: Returned call  Contact: Chapo  Type: Patient Call Back    Who called:Chapo    What is the request in detail:Patient returned call to the office. Please call    Can the clinic reply by [facility]?    Would the patient rather a call back or a response via [facility]? Call    Best call back number:[phone]

## 2020-11-20 ENCOUNTER — TELEPHONE (OUTPATIENT)
Dept: UROLOGY | Facility: CLINIC | Age: 55
End: 2020-11-20

## 2020-11-20 NOTE — TELEPHONE ENCOUNTER
----- Message from BOOGIE Hammond MD sent at 11/20/2020 10:07 AM CST -----  Regarding: RE: call back/ reschedule surgery  He wants to wait until after January 1.    Please make an appointment early January to be seen and set a date and consent again.    Thanks  ----- Message -----  From: Gail Harvey MA  Sent: 11/20/2020   9:47 AM CST  To: BOOGIE Hammond MD  Subject: FW: call back/ reschedule surgery                  ----- Message -----  From: Orestes Aggarwal  Sent: 11/20/2020   9:37 AM CST  To: [facility] Staff  Subject: call back/ reschedule surgery                    Type: Patient Call Back    Who called:Chapo     What is the request in detail: the patient is returning a call back to Dr. Hammond and the staff in regards to rescheduling his procedure.    Can the clinic reply by [facility]?no    Would the patient rather a call back or a response via [facility]? Call back    Best call back number:[phone]

## 2020-11-25 ENCOUNTER — TELEPHONE (OUTPATIENT)
Dept: UROLOGY | Facility: HOSPITAL | Age: 55
End: 2020-11-25

## 2020-11-25 DIAGNOSIS — C61 PROSTATE CANCER: Primary | ICD-10-CM

## 2020-11-25 NOTE — H&P
Chief Complaint:        Chief Complaint   Patient presents with    Follow-up       pt here to discuss treatment      Prostate Cancer  He underwent a prostate needle biopsy on 9/14/2020.  His biopsy was indicated due to: Elevated PSA.  Afterwards he experienced: Gross Hematuria, Dysuria, Blood in stool and Hematospermia.  These symptoms have not resolved.  His PSA prior to biopsy was 5.1.  His prostate size was 49 grams.  The ultrasound did not show a median lobe.  He currently does not have erectile dysfunction.  His pathology showed: prostate cancer.    He was scheduled in November 2020, but he had last minute insurance issues had to be rescheduled.             ACTIVE MEDICAL ISSUES:      Patient Active Problem List   Diagnosis    Sleep apnea    Hyperlipidemia    HTN (hypertension)    Benign prostatic hyperplasia with urinary frequency    Cardiac murmur    History of colonic polyps    Other insomnia           Past Medical History:   Diagnosis Date    History of colonic polyps 4/11/2017    HTN (hypertension) 3/7/2014    Hyperlipidemia 3/7/2014    Sleep apnea 9/10/2012            Past Surgical History:   Procedure Laterality Date    COLONOSCOPY N/A 12/11/2015     Procedure: COLONOSCOPY;  Surgeon: Balta Farah MD;  Location: [facility];  Service: Endoscopy;  Laterality: N/A;    trus/bx 2020                ALLERGIES AND MEDICATIONS: updated and reviewed.  Review of patient's allergies indicates:  No Known Allergies       Current Outpatient Medications   Medication Sig    amLODIPine (NORVASC) 5 MG tablet TAKE 1 TABLET(5 MG) BY MOUTH EVERY DAY    traZODone (DESYREL) 50 MG tablet Take 1 tablet (50 mg total) by mouth nightly as needed for Insomnia.    atorvastatin (LIPITOR) 40 MG tablet Take 1 tablet (40 mg total) by mouth once daily. (Patient not taking: Reported on 9/28/2020)    ibuprofen (ADVIL,MOTRIN) 800 MG tablet Take 1 tablet (800 mg total) by mouth nightly as needed for Pain. (Patient not taking:  "Reported on 9/28/2020)    multivit-min-folic-vit K-lycop (ONE-A-DAY MEN'S 50 PLUS) 400- mcg Tab Take 1 tablet by mouth once daily.    tamsulosin (FLOMAX) 0.4 mg Cap Take 1 capsule (0.4 mg total) by mouth once daily. (Patient not taking: Reported on 9/28/2020)      No current facility-administered medications for this visit.          Review of Systems   Constitutional: Negative for activity change, fatigue, fever and unexpected weight change.   HENT: Negative for congestion.    Eyes: Negative for redness.   Respiratory: Negative for chest tightness and shortness of breath.    Cardiovascular: Negative for chest pain and leg swelling.   Gastrointestinal: Negative for abdominal pain, constipation, diarrhea, nausea and vomiting.   Genitourinary: Negative for dysuria, flank pain, frequency, hematuria, penile pain, penile swelling, scrotal swelling, testicular pain and urgency.   Musculoskeletal: Negative for arthralgias and back pain.   Neurological: Negative for dizziness and light-headedness.   Psychiatric/Behavioral: Negative for behavioral problems and confusion. The patient is not nervous/anxious.    All other systems reviewed and are negative.      Objective:      Vitals:     10/15/20 1115   Weight: 109.6 kg (241 lb 10 oz)   Height: 6' 2" (1.88 m)      Physical Exam   Nursing note and vitals reviewed.  Constitutional: He is oriented to person, place, and time. He appears well-developed.   HENT:   Head: Normocephalic.   Eyes: Conjunctivae are normal.   Neck: Normal range of motion. Neck supple. No tracheal deviation present. No thyromegaly present.   Cardiovascular: Normal rate and normal heart sounds.    Pulmonary/Chest: Effort normal and breath sounds normal. No respiratory distress. He has no wheezes.   Abdominal: Soft. Bowel sounds are normal. There is no abdominal tenderness. There is no rebound. No hernia.   Musculoskeletal: Normal range of motion. No tenderness.   Lymphadenopathy:     He has no " cervical adenopathy.   Neurological: He is alert and oriented to person, place, and time.   Skin: Skin is warm and dry. No rash noted. No erythema.     Psychiatric: His behavior is normal. Judgment and thought content normal.       Urine dipstick shows negative for all components.  Micro exam: negative for WBC's or RBC's.     Part 1   Prostate needle biopsy (1, submitted as left base lateral):   -Adenocarcinoma, [place] patterns 3-3, pattern score 6, group 1, occupying   10% of a 12 mm biopsy   Part 2   Prostate needle biopsy (1, submitted as left base medial):   -Adenocarcinoma, [place] patterns 3-3, pattern score 6, group 1, occupying   18% of a 15 mm biopsy   Part 3   Prostate needle biopsy (1, submitted as left mid lateral):   -Adenocarcinoma, [place] patterns 3-3, pattern score 6, group 1, occupying   less than 1% of a 13 mm biopsy   Part 4   Prostate needle biopsy (1, submitted as left mid medial):   -Adenocarcinoma, Paolo patterns 3-3, pattern score 6, group 1, occupying   10% of a 15 mm biopsy   Part 5   Prostate needle biopsy (1, submitted as left apex lateral):   -Adenocarcinoma, [place] patterns 3-3, pattern score 6, group 1, occupying   12% of a 14 mm biopsy   Part 6   Prostate needle biopsy (1, submitted as left apex medial):   -Adenocarcinoma, [place] patterns 3-3, pattern score 6, group 1, occupying 3%   of a 12 mm biopsy   Part 7   Prostate needle biopsy (1, submitted as right base lateral):   -No evidence of malignancy   Part 8   Prostate needle biopsy (1, submitted as right base medial):   -No evidence of malignancy   Part 9   Prostate needle biopsy (1, submitted as right mid lateral):   -No evidence of malignancy   Part 10   Prostate needle biopsy (1, submitted as right mid medial):   -No evidence of malignancy   Part 11   Prostate needle biopsy (1, submitted as right apex lateral):   -No evidence of malignancy   Part 12   Prostate needle biopsy (1, submitted as right apex medial):   -No  evidence of malignancy     Assessment:       1. Prostate cancer    2. Nocturia more than twice per night           Plan:       1. Prostate cancer  Plan for a Robotic Assisted Laparoscopic Prostatectomy on Wednesday January 20, 2021     We discussed his grade and stage of disease, life expectancy and active surveillance vs treatment.  We discussed the roles of surgery, radiation (external and brachytherapy), HIFU and Cryosurgery, hormonal therapy and chemotherapy in the treatment of prostate cancer.  We discussed brachytherapy and external radiation therapy and open and robotic surgery.  We discussed the risks and benefits of each. We discussed erectile dysfunction, urinary incontinence and bowel issues.  We discussed referral to radiation oncology to further discuss options.  I answered his questions.        - POCT urinalysis, dipstick or tablet reag     2. Nocturia more than twice per night

## 2020-11-25 NOTE — TELEPHONE ENCOUNTER
A VM was left for the pt. Notifying him to keep his appt. And let him know he will be signing consents on that day..

## 2020-11-25 NOTE — TELEPHONE ENCOUNTER
----- Message from Gail Harvey MA sent at 11/25/2020 10:21 AM CST -----  Regarding: FW: Surgery  Contact: Lev    ----- Message -----  From: Nicol Rice  Sent: 11/25/2020  10:18 AM CST  To: [facility] Staff  Subject: Surgery                                          Type: Patient Call Back    Who called:Chapo    What is the request in detail:Patient called to schedule surgery. Please call    Can the clinic reply by [facility]?    Would the patient rather a call back or a response via [facility]? Call    Best call back number:[phone]

## 2020-11-25 NOTE — TELEPHONE ENCOUNTER
Patient chose January 20 for his rescheduled RALP.  Please have him keep his January 6th visit and we will redo consents.

## 2020-11-30 DIAGNOSIS — C61 PROSTATE CANCER: Primary | ICD-10-CM

## 2021-01-06 ENCOUNTER — OFFICE VISIT (OUTPATIENT)
Dept: UROLOGY | Facility: CLINIC | Age: 56
End: 2021-01-06
Payer: COMMERCIAL

## 2021-01-06 VITALS — WEIGHT: 235.25 LBS | HEIGHT: 74 IN | BODY MASS INDEX: 30.19 KG/M2

## 2021-01-06 DIAGNOSIS — R35.1 NOCTURIA MORE THAN TWICE PER NIGHT: ICD-10-CM

## 2021-01-06 DIAGNOSIS — C61 PROSTATE CANCER: Primary | ICD-10-CM

## 2021-01-06 PROCEDURE — 99214 OFFICE O/P EST MOD 30 MIN: CPT | Mod: S$GLB,,, | Performed by: UROLOGY

## 2021-01-06 PROCEDURE — 99214 PR OFFICE/OUTPT VISIT, EST, LEVL IV, 30-39 MIN: ICD-10-PCS | Mod: S$GLB,,, | Performed by: UROLOGY

## 2021-01-06 PROCEDURE — 99999 PR PBB SHADOW E&M-EST. PATIENT-LVL III: CPT | Mod: PBBFAC,,, | Performed by: UROLOGY

## 2021-01-06 PROCEDURE — 99999 PR PBB SHADOW E&M-EST. PATIENT-LVL III: ICD-10-PCS | Mod: PBBFAC,,, | Performed by: UROLOGY

## 2021-01-12 ENCOUNTER — ANESTHESIA EVENT (OUTPATIENT)
Dept: SURGERY | Facility: OTHER | Age: 56
End: 2021-01-12
Payer: COMMERCIAL

## 2021-01-12 ENCOUNTER — HOSPITAL ENCOUNTER (OUTPATIENT)
Dept: PREADMISSION TESTING | Facility: OTHER | Age: 56
Discharge: HOME | End: 2021-01-12
Attending: UROLOGY
Payer: COMMERCIAL

## 2021-01-12 VITALS
OXYGEN SATURATION: 99 % | HEART RATE: 72 BPM | WEIGHT: 235 LBS | HEIGHT: 74 IN | DIASTOLIC BLOOD PRESSURE: 82 MMHG | SYSTOLIC BLOOD PRESSURE: 113 MMHG | BODY MASS INDEX: 30.16 KG/M2 | TEMPERATURE: 97 F

## 2021-01-12 DIAGNOSIS — C61 PROSTATE CANCER: ICD-10-CM

## 2021-01-12 LAB
ABO + RH BLD: NORMAL
ANION GAP SERPL CALC-SCNC: 7 MMOL/L (ref 8–16)
BASOPHILS # BLD AUTO: 0.04 K/UL (ref 0–0.2)
BASOPHILS NFR BLD: 0.6 % (ref 0–1.9)
BLD GP AB SCN CELLS X3 SERPL QL: NORMAL
BUN SERPL-MCNC: 8 MG/DL (ref 6–20)
CALCIUM SERPL-MCNC: 9.7 MG/DL (ref 8.7–10.5)
CHLORIDE SERPL-SCNC: 103 MMOL/L (ref 95–110)
CO2 SERPL-SCNC: 29 MMOL/L (ref 23–29)
CREAT SERPL-MCNC: 1.1 MG/DL (ref 0.5–1.4)
DIFFERENTIAL METHOD: NORMAL
EOSINOPHIL # BLD AUTO: 0.2 K/UL (ref 0–0.5)
EOSINOPHIL NFR BLD: 3.2 % (ref 0–8)
ERYTHROCYTE [DISTWIDTH] IN BLOOD BY AUTOMATED COUNT: 13.5 % (ref 11.5–14.5)
EST. GFR  (AFRICAN AMERICAN): >60 ML/MIN/1.73 M^2
EST. GFR  (NON AFRICAN AMERICAN): >60 ML/MIN/1.73 M^2
GLUCOSE SERPL-MCNC: 85 MG/DL (ref 70–110)
HCT VFR BLD AUTO: 48.1 % (ref 40–54)
HGB BLD-MCNC: 15.6 G/DL (ref 14–18)
IMM GRANULOCYTES # BLD AUTO: 0.01 K/UL (ref 0–0.04)
IMM GRANULOCYTES NFR BLD AUTO: 0.2 % (ref 0–0.5)
LYMPHOCYTES # BLD AUTO: 1.7 K/UL (ref 1–4.8)
LYMPHOCYTES NFR BLD: 27 % (ref 18–48)
MCH RBC QN AUTO: 28.7 PG (ref 27–31)
MCHC RBC AUTO-ENTMCNC: 32.4 G/DL (ref 32–36)
MCV RBC AUTO: 89 FL (ref 82–98)
MONOCYTES # BLD AUTO: 0.6 K/UL (ref 0.3–1)
MONOCYTES NFR BLD: 9.2 % (ref 4–15)
NEUTROPHILS # BLD AUTO: 3.7 K/UL (ref 1.8–7.7)
NEUTROPHILS NFR BLD: 59.8 % (ref 38–73)
NRBC BLD-RTO: 0 /100 WBC
PLATELET # BLD AUTO: 222 K/UL (ref 150–350)
PMV BLD AUTO: 9.9 FL (ref 9.2–12.9)
POTASSIUM SERPL-SCNC: 4 MMOL/L (ref 3.5–5.1)
RBC # BLD AUTO: 5.43 M/UL (ref 4.6–6.2)
SODIUM SERPL-SCNC: 139 MMOL/L (ref 136–145)
WBC # BLD AUTO: 6.19 K/UL (ref 3.9–12.7)

## 2021-01-12 PROCEDURE — 80048 BASIC METABOLIC PNL TOTAL CA: CPT

## 2021-01-12 PROCEDURE — 85025 COMPLETE CBC W/AUTO DIFF WBC: CPT

## 2021-01-12 PROCEDURE — 87086 URINE CULTURE/COLONY COUNT: CPT

## 2021-01-12 PROCEDURE — 86900 BLOOD TYPING SEROLOGIC ABO: CPT

## 2021-01-12 PROCEDURE — 36415 COLL VENOUS BLD VENIPUNCTURE: CPT

## 2021-01-12 RX ORDER — MIDAZOLAM HYDROCHLORIDE 1 MG/ML
2 INJECTION INTRAMUSCULAR; INTRAVENOUS
Status: CANCELLED | OUTPATIENT
Start: 2021-01-12 | End: 2021-01-12

## 2021-01-12 RX ORDER — SODIUM CHLORIDE, SODIUM LACTATE, POTASSIUM CHLORIDE, CALCIUM CHLORIDE 600; 310; 30; 20 MG/100ML; MG/100ML; MG/100ML; MG/100ML
INJECTION, SOLUTION INTRAVENOUS CONTINUOUS
Status: CANCELLED | OUTPATIENT
Start: 2021-01-12

## 2021-01-12 RX ORDER — ACETAMINOPHEN 500 MG
1000 TABLET ORAL
Status: CANCELLED | OUTPATIENT
Start: 2021-01-12 | End: 2021-01-12

## 2021-01-12 RX ORDER — CELECOXIB 200 MG/1
400 CAPSULE ORAL
Status: CANCELLED | OUTPATIENT
Start: 2021-01-12 | End: 2021-01-12

## 2021-01-12 RX ORDER — PREGABALIN 50 MG/1
50 CAPSULE ORAL
Status: CANCELLED | OUTPATIENT
Start: 2021-01-12 | End: 2021-01-12

## 2021-01-12 RX ORDER — LIDOCAINE HYDROCHLORIDE 10 MG/ML
0.5 INJECTION, SOLUTION EPIDURAL; INFILTRATION; INTRACAUDAL; PERINEURAL ONCE
Status: CANCELLED | OUTPATIENT
Start: 2021-01-12 | End: 2021-01-12

## 2021-01-14 LAB — BACTERIA UR CULT: NO GROWTH

## 2021-01-15 ENCOUNTER — TELEPHONE (OUTPATIENT)
Dept: UROLOGY | Facility: CLINIC | Age: 56
End: 2021-01-15

## 2021-01-17 ENCOUNTER — LAB VISIT (OUTPATIENT)
Dept: URGENT CARE | Facility: CLINIC | Age: 56
End: 2021-01-17
Payer: COMMERCIAL

## 2021-01-17 DIAGNOSIS — C61 PROSTATE CANCER: ICD-10-CM

## 2021-01-17 PROCEDURE — U0003 INFECTIOUS AGENT DETECTION BY NUCLEIC ACID (DNA OR RNA); SEVERE ACUTE RESPIRATORY SYNDROME CORONAVIRUS 2 (SARS-COV-2) (CORONAVIRUS DISEASE [COVID-19]), AMPLIFIED PROBE TECHNIQUE, MAKING USE OF HIGH THROUGHPUT TECHNOLOGIES AS DESCRIBED BY CMS-2020-01-R: HCPCS

## 2021-01-18 LAB — SARS-COV-2 RNA RESP QL NAA+PROBE: NOT DETECTED

## 2021-01-20 ENCOUNTER — HOSPITAL ENCOUNTER (OUTPATIENT)
Facility: OTHER | Age: 56
Discharge: HOME | End: 2021-01-21
Attending: UROLOGY | Admitting: UROLOGY
Payer: COMMERCIAL

## 2021-01-20 ENCOUNTER — ANESTHESIA (OUTPATIENT)
Dept: SURGERY | Facility: OTHER | Age: 56
End: 2021-01-20
Payer: COMMERCIAL

## 2021-01-20 DIAGNOSIS — C61 PROSTATE CANCER: Primary | ICD-10-CM

## 2021-01-20 LAB
ANION GAP SERPL CALC-SCNC: 9 MMOL/L (ref 8–16)
BASOPHILS # BLD AUTO: 0.03 K/UL (ref 0–0.2)
BASOPHILS NFR BLD: 0.3 % (ref 0–1.9)
BUN SERPL-MCNC: 12 MG/DL (ref 6–20)
CALCIUM SERPL-MCNC: 8.6 MG/DL (ref 8.7–10.5)
CHLORIDE SERPL-SCNC: 107 MMOL/L (ref 95–110)
CO2 SERPL-SCNC: 24 MMOL/L (ref 23–29)
CREAT SERPL-MCNC: 1 MG/DL (ref 0.5–1.4)
DIFFERENTIAL METHOD: ABNORMAL
EOSINOPHIL # BLD AUTO: 0 K/UL (ref 0–0.5)
EOSINOPHIL NFR BLD: 0.3 % (ref 0–8)
ERYTHROCYTE [DISTWIDTH] IN BLOOD BY AUTOMATED COUNT: 14.1 % (ref 11.5–14.5)
EST. GFR  (AFRICAN AMERICAN): >60 ML/MIN/1.73 M^2
EST. GFR  (NON AFRICAN AMERICAN): >60 ML/MIN/1.73 M^2
GLUCOSE SERPL-MCNC: 116 MG/DL (ref 70–110)
HCT VFR BLD AUTO: 40.4 % (ref 40–54)
HGB BLD-MCNC: 13.1 G/DL (ref 14–18)
IMM GRANULOCYTES # BLD AUTO: 0.04 K/UL (ref 0–0.04)
IMM GRANULOCYTES NFR BLD AUTO: 0.4 % (ref 0–0.5)
LYMPHOCYTES # BLD AUTO: 1.1 K/UL (ref 1–4.8)
LYMPHOCYTES NFR BLD: 11.4 % (ref 18–48)
MCH RBC QN AUTO: 28.9 PG (ref 27–31)
MCHC RBC AUTO-ENTMCNC: 32.4 G/DL (ref 32–36)
MCV RBC AUTO: 89 FL (ref 82–98)
MONOCYTES # BLD AUTO: 0.3 K/UL (ref 0.3–1)
MONOCYTES NFR BLD: 3.2 % (ref 4–15)
NEUTROPHILS # BLD AUTO: 8 K/UL (ref 1.8–7.7)
NEUTROPHILS NFR BLD: 84.4 % (ref 38–73)
NRBC BLD-RTO: 0 /100 WBC
PLATELET # BLD AUTO: 182 K/UL (ref 150–350)
PMV BLD AUTO: 10.3 FL (ref 9.2–12.9)
POTASSIUM SERPL-SCNC: 4 MMOL/L (ref 3.5–5.1)
RBC # BLD AUTO: 4.54 M/UL (ref 4.6–6.2)
SODIUM SERPL-SCNC: 140 MMOL/L (ref 136–145)
WBC # BLD AUTO: 9.49 K/UL (ref 3.9–12.7)

## 2021-01-20 PROCEDURE — 64488 TAP BLOCK BI INJECTION: CPT | Performed by: ANESTHESIOLOGY

## 2021-01-20 PROCEDURE — 36000712 HC OR TIME LEV V 1ST 15 MIN: Performed by: UROLOGY

## 2021-01-20 PROCEDURE — 25000003 PHARM REV CODE 250: Performed by: ANESTHESIOLOGY

## 2021-01-20 PROCEDURE — 71000039 HC RECOVERY, EACH ADD'L HOUR: Performed by: UROLOGY

## 2021-01-20 PROCEDURE — 85025 COMPLETE CBC W/AUTO DIFF WBC: CPT

## 2021-01-20 PROCEDURE — C9290 INJ, BUPIVACAINE LIPOSOME: HCPCS | Performed by: ANESTHESIOLOGY

## 2021-01-20 PROCEDURE — 63600175 PHARM REV CODE 636 W HCPCS: Performed by: ANESTHESIOLOGY

## 2021-01-20 PROCEDURE — 25000003 PHARM REV CODE 250: Performed by: NURSE ANESTHETIST, CERTIFIED REGISTERED

## 2021-01-20 PROCEDURE — 63600175 PHARM REV CODE 636 W HCPCS: Performed by: UROLOGY

## 2021-01-20 PROCEDURE — 63600175 PHARM REV CODE 636 W HCPCS: Performed by: NURSE ANESTHETIST, CERTIFIED REGISTERED

## 2021-01-20 PROCEDURE — 94761 N-INVAS EAR/PLS OXIMETRY MLT: CPT

## 2021-01-20 PROCEDURE — 38571 LAPAROSCOPY LYMPHADENECTOMY: CPT | Mod: 51,,, | Performed by: UROLOGY

## 2021-01-20 PROCEDURE — 27201423 OPTIME MED/SURG SUP & DEVICES STERILE SUPPLY: Performed by: UROLOGY

## 2021-01-20 PROCEDURE — 88307 TISSUE EXAM BY PATHOLOGIST: CPT | Mod: 26,,, | Performed by: STUDENT IN AN ORGANIZED HEALTH CARE EDUCATION/TRAINING PROGRAM

## 2021-01-20 PROCEDURE — 36000713 HC OR TIME LEV V EA ADD 15 MIN: Performed by: UROLOGY

## 2021-01-20 PROCEDURE — 88309 TISSUE EXAM BY PATHOLOGIST: CPT | Mod: 26,,, | Performed by: STUDENT IN AN ORGANIZED HEALTH CARE EDUCATION/TRAINING PROGRAM

## 2021-01-20 PROCEDURE — C1729 CATH, DRAINAGE: HCPCS | Performed by: UROLOGY

## 2021-01-20 PROCEDURE — 37000009 HC ANESTHESIA EA ADD 15 MINS: Performed by: UROLOGY

## 2021-01-20 PROCEDURE — 88307 PR  SURG PATH,LEVEL V: ICD-10-PCS | Mod: 26,,, | Performed by: STUDENT IN AN ORGANIZED HEALTH CARE EDUCATION/TRAINING PROGRAM

## 2021-01-20 PROCEDURE — 25000003 PHARM REV CODE 250: Performed by: UROLOGY

## 2021-01-20 PROCEDURE — A4216 STERILE WATER/SALINE, 10 ML: HCPCS | Performed by: NURSE ANESTHETIST, CERTIFIED REGISTERED

## 2021-01-20 PROCEDURE — 80048 BASIC METABOLIC PNL TOTAL CA: CPT

## 2021-01-20 PROCEDURE — 37000008 HC ANESTHESIA 1ST 15 MINUTES: Performed by: UROLOGY

## 2021-01-20 PROCEDURE — 38571 PR LAP,PELVIC LYMPHADENECTOMY: ICD-10-PCS | Mod: 51,,, | Performed by: UROLOGY

## 2021-01-20 PROCEDURE — 55866 LAPS SURG PRST8ECT RPBIC RAD: CPT | Mod: ,,, | Performed by: UROLOGY

## 2021-01-20 PROCEDURE — 88309 PR  SURG PATH,LEVEL VI: ICD-10-PCS | Mod: 26,,, | Performed by: STUDENT IN AN ORGANIZED HEALTH CARE EDUCATION/TRAINING PROGRAM

## 2021-01-20 PROCEDURE — 88307 TISSUE EXAM BY PATHOLOGIST: CPT | Performed by: STUDENT IN AN ORGANIZED HEALTH CARE EDUCATION/TRAINING PROGRAM

## 2021-01-20 PROCEDURE — 36415 COLL VENOUS BLD VENIPUNCTURE: CPT

## 2021-01-20 PROCEDURE — 71000033 HC RECOVERY, INTIAL HOUR: Performed by: UROLOGY

## 2021-01-20 PROCEDURE — 55866 PR LAP,PROSTATECTOMY,RADICAL,W/NERVE SPARE,INCL ROBOTIC: ICD-10-PCS | Mod: ,,, | Performed by: UROLOGY

## 2021-01-20 PROCEDURE — 88309 TISSUE EXAM BY PATHOLOGIST: CPT | Performed by: STUDENT IN AN ORGANIZED HEALTH CARE EDUCATION/TRAINING PROGRAM

## 2021-01-20 RX ORDER — ONDANSETRON 2 MG/ML
4 INJECTION INTRAMUSCULAR; INTRAVENOUS DAILY PRN
Status: DISCONTINUED | OUTPATIENT
Start: 2021-01-20 | End: 2021-01-20

## 2021-01-20 RX ORDER — BUPIVACAINE HYDROCHLORIDE 2.5 MG/ML
INJECTION, SOLUTION EPIDURAL; INFILTRATION; INTRACAUDAL
Status: DISCONTINUED | OUTPATIENT
Start: 2021-01-20 | End: 2021-01-20

## 2021-01-20 RX ORDER — DOCUSATE SODIUM 100 MG/1
100 CAPSULE, LIQUID FILLED ORAL 2 TIMES DAILY
Status: DISCONTINUED | OUTPATIENT
Start: 2021-01-20 | End: 2021-01-21 | Stop reason: HOSPADM

## 2021-01-20 RX ORDER — SODIUM CHLORIDE 0.9 % (FLUSH) 0.9 %
3 SYRINGE (ML) INJECTION
Status: DISCONTINUED | OUTPATIENT
Start: 2021-01-20 | End: 2021-01-21 | Stop reason: HOSPADM

## 2021-01-20 RX ORDER — PHENYLEPHRINE HYDROCHLORIDE 10 MG/ML
INJECTION INTRAVENOUS
Status: DISCONTINUED | OUTPATIENT
Start: 2021-01-20 | End: 2021-01-20

## 2021-01-20 RX ORDER — OXYCODONE HYDROCHLORIDE 5 MG/1
5 TABLET ORAL
Status: DISCONTINUED | OUTPATIENT
Start: 2021-01-20 | End: 2021-01-20 | Stop reason: HOSPADM

## 2021-01-20 RX ORDER — ONDANSETRON 2 MG/ML
4 INJECTION INTRAMUSCULAR; INTRAVENOUS DAILY PRN
Status: DISCONTINUED | OUTPATIENT
Start: 2021-01-20 | End: 2021-01-20 | Stop reason: HOSPADM

## 2021-01-20 RX ORDER — FENTANYL CITRATE 50 UG/ML
100 INJECTION, SOLUTION INTRAMUSCULAR; INTRAVENOUS EVERY 5 MIN PRN
Status: COMPLETED | OUTPATIENT
Start: 2021-01-20 | End: 2021-01-20

## 2021-01-20 RX ORDER — LIDOCAINE HYDROCHLORIDE 20 MG/ML
INJECTION INTRAVENOUS
Status: DISCONTINUED | OUTPATIENT
Start: 2021-01-20 | End: 2021-01-20

## 2021-01-20 RX ORDER — DIPHENHYDRAMINE HYDROCHLORIDE 50 MG/ML
12.5 INJECTION INTRAMUSCULAR; INTRAVENOUS EVERY 4 HOURS PRN
Status: DISCONTINUED | OUTPATIENT
Start: 2021-01-20 | End: 2021-01-21 | Stop reason: HOSPADM

## 2021-01-20 RX ORDER — PROPOFOL 10 MG/ML
VIAL (ML) INTRAVENOUS
Status: DISCONTINUED | OUTPATIENT
Start: 2021-01-20 | End: 2021-01-20

## 2021-01-20 RX ORDER — ACETAMINOPHEN 10 MG/ML
1000 INJECTION, SOLUTION INTRAVENOUS ONCE
Status: COMPLETED | OUTPATIENT
Start: 2021-01-20 | End: 2021-01-20

## 2021-01-20 RX ORDER — CELECOXIB 200 MG/1
400 CAPSULE ORAL
Status: COMPLETED | OUTPATIENT
Start: 2021-01-20 | End: 2021-01-20

## 2021-01-20 RX ORDER — ROCURONIUM BROMIDE 10 MG/ML
INJECTION, SOLUTION INTRAVENOUS
Status: DISCONTINUED | OUTPATIENT
Start: 2021-01-20 | End: 2021-01-20

## 2021-01-20 RX ORDER — AMLODIPINE BESYLATE 5 MG/1
5 TABLET ORAL DAILY
Status: DISCONTINUED | OUTPATIENT
Start: 2021-01-20 | End: 2021-01-21 | Stop reason: HOSPADM

## 2021-01-20 RX ORDER — SODIUM CHLORIDE, SODIUM LACTATE, POTASSIUM CHLORIDE, CALCIUM CHLORIDE 600; 310; 30; 20 MG/100ML; MG/100ML; MG/100ML; MG/100ML
INJECTION, SOLUTION INTRAVENOUS CONTINUOUS
Status: DISCONTINUED | OUTPATIENT
Start: 2021-01-20 | End: 2021-01-20

## 2021-01-20 RX ORDER — MEPERIDINE HYDROCHLORIDE 25 MG/ML
12.5 INJECTION INTRAMUSCULAR; INTRAVENOUS; SUBCUTANEOUS ONCE AS NEEDED
Status: DISCONTINUED | OUTPATIENT
Start: 2021-01-20 | End: 2021-01-20

## 2021-01-20 RX ORDER — OXYCODONE HYDROCHLORIDE 5 MG/1
10 TABLET ORAL EVERY 4 HOURS PRN
Status: DISCONTINUED | OUTPATIENT
Start: 2021-01-20 | End: 2021-01-21 | Stop reason: HOSPADM

## 2021-01-20 RX ORDER — ONDANSETRON 2 MG/ML
4 INJECTION INTRAMUSCULAR; INTRAVENOUS EVERY 8 HOURS PRN
Status: DISCONTINUED | OUTPATIENT
Start: 2021-01-20 | End: 2021-01-21 | Stop reason: HOSPADM

## 2021-01-20 RX ORDER — MEPERIDINE HYDROCHLORIDE 25 MG/ML
12.5 INJECTION INTRAMUSCULAR; INTRAVENOUS; SUBCUTANEOUS ONCE AS NEEDED
Status: DISCONTINUED | OUTPATIENT
Start: 2021-01-20 | End: 2021-01-20 | Stop reason: HOSPADM

## 2021-01-20 RX ORDER — DIPHENHYDRAMINE HYDROCHLORIDE 50 MG/ML
25 INJECTION INTRAMUSCULAR; INTRAVENOUS EVERY 6 HOURS PRN
Status: DISCONTINUED | OUTPATIENT
Start: 2021-01-20 | End: 2021-01-20 | Stop reason: HOSPADM

## 2021-01-20 RX ORDER — ACETAMINOPHEN 500 MG
1000 TABLET ORAL EVERY 8 HOURS
Status: DISCONTINUED | OUTPATIENT
Start: 2021-01-20 | End: 2021-01-21 | Stop reason: HOSPADM

## 2021-01-20 RX ORDER — ACETAMINOPHEN 500 MG
1000 TABLET ORAL
Status: COMPLETED | OUTPATIENT
Start: 2021-01-20 | End: 2021-01-20

## 2021-01-20 RX ORDER — HYDROMORPHONE HYDROCHLORIDE 2 MG/ML
0.4 INJECTION, SOLUTION INTRAMUSCULAR; INTRAVENOUS; SUBCUTANEOUS EVERY 5 MIN PRN
Status: DISCONTINUED | OUTPATIENT
Start: 2021-01-20 | End: 2021-01-20

## 2021-01-20 RX ORDER — SODIUM CHLORIDE 9 MG/ML
INJECTION, SOLUTION INTRAVENOUS CONTINUOUS
Status: DISCONTINUED | OUTPATIENT
Start: 2021-01-20 | End: 2021-01-21 | Stop reason: HOSPADM

## 2021-01-20 RX ORDER — MIDAZOLAM HYDROCHLORIDE 1 MG/ML
2 INJECTION INTRAMUSCULAR; INTRAVENOUS
Status: COMPLETED | OUTPATIENT
Start: 2021-01-20 | End: 2021-01-20

## 2021-01-20 RX ORDER — OXYCODONE HYDROCHLORIDE 5 MG/1
5 TABLET ORAL
Status: DISCONTINUED | OUTPATIENT
Start: 2021-01-20 | End: 2021-01-20

## 2021-01-20 RX ORDER — LIDOCAINE HYDROCHLORIDE 10 MG/ML
0.5 INJECTION, SOLUTION EPIDURAL; INFILTRATION; INTRACAUDAL; PERINEURAL ONCE
Status: DISCONTINUED | OUTPATIENT
Start: 2021-01-20 | End: 2021-01-20

## 2021-01-20 RX ORDER — CEFAZOLIN SODIUM 2 G/50ML
2 SOLUTION INTRAVENOUS
Status: COMPLETED | OUTPATIENT
Start: 2021-01-20 | End: 2021-01-21

## 2021-01-20 RX ORDER — PREGABALIN 50 MG/1
50 CAPSULE ORAL
Status: COMPLETED | OUTPATIENT
Start: 2021-01-20 | End: 2021-01-20

## 2021-01-20 RX ORDER — MIDAZOLAM HYDROCHLORIDE 1 MG/ML
5 INJECTION INTRAMUSCULAR; INTRAVENOUS ONCE
Status: COMPLETED | OUTPATIENT
Start: 2021-01-20 | End: 2021-01-20

## 2021-01-20 RX ORDER — FAMOTIDINE 20 MG/1
20 TABLET, FILM COATED ORAL 2 TIMES DAILY
Status: DISCONTINUED | OUTPATIENT
Start: 2021-01-20 | End: 2021-01-21 | Stop reason: HOSPADM

## 2021-01-20 RX ORDER — CEFAZOLIN SODIUM 1 G/3ML
2 INJECTION, POWDER, FOR SOLUTION INTRAMUSCULAR; INTRAVENOUS
Status: COMPLETED | OUTPATIENT
Start: 2021-01-20 | End: 2021-01-20

## 2021-01-20 RX ORDER — HYDROMORPHONE HYDROCHLORIDE 2 MG/ML
0.4 INJECTION, SOLUTION INTRAMUSCULAR; INTRAVENOUS; SUBCUTANEOUS EVERY 5 MIN PRN
Status: DISCONTINUED | OUTPATIENT
Start: 2021-01-20 | End: 2021-01-20 | Stop reason: HOSPADM

## 2021-01-20 RX ORDER — DEXAMETHASONE SODIUM PHOSPHATE 4 MG/ML
INJECTION, SOLUTION INTRA-ARTICULAR; INTRALESIONAL; INTRAMUSCULAR; INTRAVENOUS; SOFT TISSUE
Status: DISCONTINUED | OUTPATIENT
Start: 2021-01-20 | End: 2021-01-20

## 2021-01-20 RX ORDER — ONDANSETRON 2 MG/ML
INJECTION INTRAMUSCULAR; INTRAVENOUS
Status: DISCONTINUED | OUTPATIENT
Start: 2021-01-20 | End: 2021-01-20

## 2021-01-20 RX ORDER — OXYCODONE HYDROCHLORIDE 5 MG/1
5 TABLET ORAL EVERY 4 HOURS PRN
Status: DISCONTINUED | OUTPATIENT
Start: 2021-01-20 | End: 2021-01-21 | Stop reason: HOSPADM

## 2021-01-20 RX ORDER — BISACODYL 10 MG
10 SUPPOSITORY, RECTAL RECTAL 2 TIMES DAILY
Status: DISCONTINUED | OUTPATIENT
Start: 2021-01-20 | End: 2021-01-21 | Stop reason: HOSPADM

## 2021-01-20 RX ORDER — MORPHINE SULFATE 10 MG/ML
1 INJECTION INTRAMUSCULAR; INTRAVENOUS; SUBCUTANEOUS
Status: DISCONTINUED | OUTPATIENT
Start: 2021-01-20 | End: 2021-01-21 | Stop reason: HOSPADM

## 2021-01-20 RX ORDER — KETAMINE HCL IN 0.9 % NACL 50 MG/5 ML
SYRINGE (ML) INTRAVENOUS
Status: DISCONTINUED | OUTPATIENT
Start: 2021-01-20 | End: 2021-01-20

## 2021-01-20 RX ADMIN — SUGAMMADEX 200 MG: 100 INJECTION, SOLUTION INTRAVENOUS at 10:01

## 2021-01-20 RX ADMIN — BISACODYL 10 MG: 10 SUPPOSITORY RECTAL at 09:01

## 2021-01-20 RX ADMIN — FAMOTIDINE 20 MG: 20 TABLET ORAL at 09:01

## 2021-01-20 RX ADMIN — ACETAMINOPHEN 1000 MG: 10 INJECTION, SOLUTION INTRAVENOUS at 12:01

## 2021-01-20 RX ADMIN — CEFAZOLIN 2 G: 330 INJECTION, POWDER, FOR SOLUTION INTRAMUSCULAR; INTRAVENOUS at 07:01

## 2021-01-20 RX ADMIN — ROCURONIUM BROMIDE 50 MG: 10 SOLUTION INTRAVENOUS at 07:01

## 2021-01-20 RX ADMIN — FENTANYL CITRATE 100 MCG: 50 INJECTION, SOLUTION INTRAMUSCULAR; INTRAVENOUS at 06:01

## 2021-01-20 RX ADMIN — ROCURONIUM BROMIDE 10 MG: 10 SOLUTION INTRAVENOUS at 09:01

## 2021-01-20 RX ADMIN — SODIUM CHLORIDE 125 ML/HR: 0.9 INJECTION, SOLUTION INTRAVENOUS at 02:01

## 2021-01-20 RX ADMIN — PREGABALIN 50 MG: 50 CAPSULE ORAL at 06:01

## 2021-01-20 RX ADMIN — ACETAMINOPHEN 1000 MG: 500 TABLET, FILM COATED ORAL at 09:01

## 2021-01-20 RX ADMIN — DEXTROSE 2 G: 50 INJECTION, SOLUTION INTRAVENOUS at 07:01

## 2021-01-20 RX ADMIN — DOCUSATE SODIUM 100 MG: 100 CAPSULE, LIQUID FILLED ORAL at 09:01

## 2021-01-20 RX ADMIN — PHENYLEPHRINE HYDROCHLORIDE 100 MCG: 10 INJECTION INTRAVENOUS at 10:01

## 2021-01-20 RX ADMIN — SODIUM CHLORIDE 0.5 MCG/KG/HR: 9 INJECTION, SOLUTION INTRAMUSCULAR; INTRAVENOUS; SUBCUTANEOUS at 07:01

## 2021-01-20 RX ADMIN — OXYCODONE 5 MG: 5 TABLET ORAL at 12:01

## 2021-01-20 RX ADMIN — SODIUM CHLORIDE 125 ML/HR: 0.9 INJECTION, SOLUTION INTRAVENOUS at 09:01

## 2021-01-20 RX ADMIN — DEXAMETHASONE SODIUM PHOSPHATE 8 MG: 4 INJECTION, SOLUTION INTRAMUSCULAR; INTRAVENOUS at 07:01

## 2021-01-20 RX ADMIN — BUPIVACAINE HYDROCHLORIDE 40 ML: 2.5 INJECTION, SOLUTION EPIDURAL; INFILTRATION; INTRACAUDAL; PERINEURAL at 11:01

## 2021-01-20 RX ADMIN — ACETAMINOPHEN 1000 MG: 500 TABLET, FILM COATED ORAL at 06:01

## 2021-01-20 RX ADMIN — MIDAZOLAM HYDROCHLORIDE 2 MG: 1 INJECTION, SOLUTION INTRAMUSCULAR; INTRAVENOUS at 06:01

## 2021-01-20 RX ADMIN — ROCURONIUM BROMIDE 20 MG: 10 SOLUTION INTRAVENOUS at 08:01

## 2021-01-20 RX ADMIN — LIDOCAINE HYDROCHLORIDE 40 MG: 20 INJECTION, SOLUTION INTRAVENOUS at 07:01

## 2021-01-20 RX ADMIN — PROPOFOL 200 MG: 10 INJECTION, EMULSION INTRAVENOUS at 07:01

## 2021-01-20 RX ADMIN — SODIUM CHLORIDE, SODIUM LACTATE, POTASSIUM CHLORIDE, AND CALCIUM CHLORIDE: 600; 310; 30; 20 INJECTION, SOLUTION INTRAVENOUS at 10:01

## 2021-01-20 RX ADMIN — SODIUM CHLORIDE, SODIUM LACTATE, POTASSIUM CHLORIDE, AND CALCIUM CHLORIDE: 600; 310; 30; 20 INJECTION, SOLUTION INTRAVENOUS at 06:01

## 2021-01-20 RX ADMIN — PHENYLEPHRINE HYDROCHLORIDE 100 MCG: 10 INJECTION INTRAVENOUS at 07:01

## 2021-01-20 RX ADMIN — CELECOXIB 400 MG: 200 CAPSULE ORAL at 06:01

## 2021-01-20 RX ADMIN — ONDANSETRON HYDROCHLORIDE 4 MG: 2 INJECTION INTRAMUSCULAR; INTRAVENOUS at 07:01

## 2021-01-20 RX ADMIN — BUPIVACAINE 20 ML: 13.3 INJECTION, SUSPENSION, LIPOSOMAL INFILTRATION at 06:01

## 2021-01-20 RX ADMIN — AMLODIPINE BESYLATE 5 MG: 5 TABLET ORAL at 12:01

## 2021-01-20 RX ADMIN — DEXTROSE 2 G: 50 INJECTION, SOLUTION INTRAVENOUS at 12:01

## 2021-01-20 RX ADMIN — Medication 50 MG: at 07:01

## 2021-01-21 VITALS
OXYGEN SATURATION: 97 % | RESPIRATION RATE: 16 BRPM | DIASTOLIC BLOOD PRESSURE: 81 MMHG | BODY MASS INDEX: 29.37 KG/M2 | SYSTOLIC BLOOD PRESSURE: 145 MMHG | HEIGHT: 74 IN | HEART RATE: 67 BPM | WEIGHT: 228.88 LBS | TEMPERATURE: 98 F

## 2021-01-21 LAB
ANION GAP SERPL CALC-SCNC: 8 MMOL/L (ref 8–16)
BASOPHILS # BLD AUTO: 0.01 K/UL (ref 0–0.2)
BASOPHILS NFR BLD: 0.1 % (ref 0–1.9)
BUN SERPL-MCNC: 13 MG/DL (ref 6–20)
CALCIUM SERPL-MCNC: 8.1 MG/DL (ref 8.7–10.5)
CHLORIDE SERPL-SCNC: 106 MMOL/L (ref 95–110)
CO2 SERPL-SCNC: 24 MMOL/L (ref 23–29)
CREAT SERPL-MCNC: 1 MG/DL (ref 0.5–1.4)
DIFFERENTIAL METHOD: ABNORMAL
EOSINOPHIL # BLD AUTO: 0 K/UL (ref 0–0.5)
EOSINOPHIL NFR BLD: 0 % (ref 0–8)
ERYTHROCYTE [DISTWIDTH] IN BLOOD BY AUTOMATED COUNT: 14.2 % (ref 11.5–14.5)
EST. GFR  (AFRICAN AMERICAN): >60 ML/MIN/1.73 M^2
EST. GFR  (NON AFRICAN AMERICAN): >60 ML/MIN/1.73 M^2
GLUCOSE SERPL-MCNC: 132 MG/DL (ref 70–110)
HCT VFR BLD AUTO: 32.1 % (ref 40–54)
HGB BLD-MCNC: 10.3 G/DL (ref 14–18)
IMM GRANULOCYTES # BLD AUTO: 0.04 K/UL (ref 0–0.04)
IMM GRANULOCYTES NFR BLD AUTO: 0.4 % (ref 0–0.5)
LYMPHOCYTES # BLD AUTO: 1 K/UL (ref 1–4.8)
LYMPHOCYTES NFR BLD: 10.8 % (ref 18–48)
MCH RBC QN AUTO: 28.5 PG (ref 27–31)
MCHC RBC AUTO-ENTMCNC: 32.1 G/DL (ref 32–36)
MCV RBC AUTO: 89 FL (ref 82–98)
MONOCYTES # BLD AUTO: 0.7 K/UL (ref 0.3–1)
MONOCYTES NFR BLD: 8 % (ref 4–15)
NEUTROPHILS # BLD AUTO: 7.2 K/UL (ref 1.8–7.7)
NEUTROPHILS NFR BLD: 80.7 % (ref 38–73)
NRBC BLD-RTO: 0 /100 WBC
PLATELET # BLD AUTO: 201 K/UL (ref 150–350)
PMV BLD AUTO: 11.2 FL (ref 9.2–12.9)
POTASSIUM SERPL-SCNC: 4.2 MMOL/L (ref 3.5–5.1)
RBC # BLD AUTO: 3.61 M/UL (ref 4.6–6.2)
SODIUM SERPL-SCNC: 138 MMOL/L (ref 136–145)
WBC # BLD AUTO: 8.98 K/UL (ref 3.9–12.7)

## 2021-01-21 PROCEDURE — 36415 COLL VENOUS BLD VENIPUNCTURE: CPT

## 2021-01-21 PROCEDURE — 63600175 PHARM REV CODE 636 W HCPCS: Performed by: UROLOGY

## 2021-01-21 PROCEDURE — 85025 COMPLETE CBC W/AUTO DIFF WBC: CPT

## 2021-01-21 PROCEDURE — 25000003 PHARM REV CODE 250: Performed by: UROLOGY

## 2021-01-21 PROCEDURE — 80048 BASIC METABOLIC PNL TOTAL CA: CPT

## 2021-01-21 RX ORDER — OXYCODONE AND ACETAMINOPHEN 5; 325 MG/1; MG/1
1 TABLET ORAL EVERY 4 HOURS PRN
Qty: 6 TABLET | Refills: 0 | Status: SHIPPED | OUTPATIENT
Start: 2021-01-21 | End: 2022-07-29

## 2021-01-21 RX ORDER — POLYETHYLENE GLYCOL 3350 17 G/17G
17 POWDER, FOR SOLUTION ORAL DAILY
Qty: 10 PACKET | Refills: 0 | Status: SHIPPED | OUTPATIENT
Start: 2021-01-21 | End: 2022-07-29

## 2021-01-21 RX ADMIN — DEXTROSE 2 G: 50 INJECTION, SOLUTION INTRAVENOUS at 01:01

## 2021-01-21 RX ADMIN — FAMOTIDINE 20 MG: 20 TABLET ORAL at 08:01

## 2021-01-21 RX ADMIN — DOCUSATE SODIUM 100 MG: 100 CAPSULE, LIQUID FILLED ORAL at 08:01

## 2021-01-21 RX ADMIN — ACETAMINOPHEN 1000 MG: 500 TABLET, FILM COATED ORAL at 05:01

## 2021-01-21 RX ADMIN — AMLODIPINE BESYLATE 5 MG: 5 TABLET ORAL at 08:01

## 2021-01-21 RX ADMIN — SODIUM CHLORIDE 125 ML/HR: 0.9 INJECTION, SOLUTION INTRAVENOUS at 05:01

## 2021-01-26 ENCOUNTER — OFFICE VISIT (OUTPATIENT)
Dept: UROLOGY | Facility: CLINIC | Age: 56
End: 2021-01-26
Payer: COMMERCIAL

## 2021-01-26 VITALS — WEIGHT: 229.75 LBS | BODY MASS INDEX: 29.48 KG/M2 | HEIGHT: 74 IN

## 2021-01-26 DIAGNOSIS — C61 PROSTATE CANCER: Primary | ICD-10-CM

## 2021-01-26 PROCEDURE — 99999 PR PBB SHADOW E&M-EST. PATIENT-LVL III: CPT | Mod: PBBFAC,,, | Performed by: UROLOGY

## 2021-01-26 PROCEDURE — 99999 PR PBB SHADOW E&M-EST. PATIENT-LVL III: ICD-10-PCS | Mod: PBBFAC,,, | Performed by: UROLOGY

## 2021-01-26 PROCEDURE — 99024 PR POST-OP FOLLOW-UP VISIT: ICD-10-PCS | Mod: S$GLB,,, | Performed by: UROLOGY

## 2021-01-26 PROCEDURE — 99024 POSTOP FOLLOW-UP VISIT: CPT | Mod: S$GLB,,, | Performed by: UROLOGY

## 2021-02-01 LAB
COMMENT: NORMAL
FINAL PATHOLOGIC DIAGNOSIS: NORMAL
GROSS: NORMAL
Lab: NORMAL
MICROSCOPIC EXAM: NORMAL

## 2021-02-18 ENCOUNTER — OFFICE VISIT (OUTPATIENT)
Dept: UROLOGY | Facility: CLINIC | Age: 56
End: 2021-02-18

## 2021-02-18 VITALS — HEIGHT: 74 IN | WEIGHT: 241.38 LBS | BODY MASS INDEX: 30.98 KG/M2

## 2021-02-18 DIAGNOSIS — C61 PROSTATE CANCER: Primary | ICD-10-CM

## 2021-02-18 DIAGNOSIS — R35.1 NOCTURIA MORE THAN TWICE PER NIGHT: ICD-10-CM

## 2021-02-18 DIAGNOSIS — N39.3 SUI (STRESS URINARY INCONTINENCE), MALE: ICD-10-CM

## 2021-02-18 LAB
BILIRUB SERPL-MCNC: NORMAL MG/DL
BLOOD URINE, POC: NORMAL
COLOR, POC UA: YELLOW
GLUCOSE UR QL STRIP: NORMAL
KETONES UR QL STRIP: NORMAL
LEUKOCYTE ESTERASE URINE, POC: NORMAL
NITRITE, POC UA: NORMAL
PH, POC UA: 5
PROTEIN, POC: NORMAL
SPECIFIC GRAVITY, POC UA: 1025
UROBILINOGEN, POC UA: NORMAL

## 2021-02-18 PROCEDURE — 99024 PR POST-OP FOLLOW-UP VISIT: ICD-10-PCS | Mod: ,,, | Performed by: UROLOGY

## 2021-02-18 PROCEDURE — 99999 PR PBB SHADOW E&M-EST. PATIENT-LVL III: CPT | Mod: PBBFAC,,, | Performed by: UROLOGY

## 2021-02-18 PROCEDURE — 81001 URINALYSIS AUTO W/SCOPE: CPT | Mod: PBBFAC | Performed by: UROLOGY

## 2021-02-18 PROCEDURE — 99999 PR PBB SHADOW E&M-EST. PATIENT-LVL III: ICD-10-PCS | Mod: PBBFAC,,, | Performed by: UROLOGY

## 2021-02-18 PROCEDURE — 99024 POSTOP FOLLOW-UP VISIT: CPT | Mod: ,,, | Performed by: UROLOGY

## 2021-02-25 ENCOUNTER — LAB VISIT (OUTPATIENT)
Dept: LAB | Facility: HOSPITAL | Age: 56
End: 2021-02-25
Attending: UROLOGY
Payer: COMMERCIAL

## 2021-02-25 DIAGNOSIS — C61 PROSTATE CANCER: ICD-10-CM

## 2021-02-25 LAB — COMPLEXED PSA SERPL-MCNC: 0.01 NG/ML (ref 0–4)

## 2021-02-25 PROCEDURE — 84153 ASSAY OF PSA TOTAL: CPT

## 2021-02-25 PROCEDURE — 36415 COLL VENOUS BLD VENIPUNCTURE: CPT | Mod: PO

## 2021-03-08 ENCOUNTER — OFFICE VISIT (OUTPATIENT)
Dept: UROLOGY | Facility: CLINIC | Age: 56
End: 2021-03-08
Payer: COMMERCIAL

## 2021-03-08 VITALS — HEIGHT: 74 IN | WEIGHT: 241.38 LBS | BODY MASS INDEX: 30.98 KG/M2

## 2021-03-08 DIAGNOSIS — C61 PROSTATE CANCER: Primary | ICD-10-CM

## 2021-03-08 DIAGNOSIS — N50.9 DISORDER OF MALE GENITAL ORGANS, UNSPECIFIED: ICD-10-CM

## 2021-03-08 DIAGNOSIS — N39.3 SUI (STRESS URINARY INCONTINENCE), MALE: ICD-10-CM

## 2021-03-08 DIAGNOSIS — R10.2 PERINEAL PAIN IN MALE: ICD-10-CM

## 2021-03-08 DIAGNOSIS — N52.31 ERECTILE DYSFUNCTION AFTER RADICAL PROSTATECTOMY: ICD-10-CM

## 2021-03-08 LAB
BILIRUB SERPL-MCNC: NORMAL MG/DL
BLOOD URINE, POC: NORMAL
COLOR, POC UA: YELLOW
GLUCOSE UR QL STRIP: NORMAL
KETONES UR QL STRIP: NORMAL
LEUKOCYTE ESTERASE URINE, POC: NORMAL
NITRITE, POC UA: NORMAL
PH, POC UA: 5
PROTEIN, POC: NORMAL
SPECIFIC GRAVITY, POC UA: 1000
UROBILINOGEN, POC UA: NORMAL

## 2021-03-08 PROCEDURE — 99999 PR PBB SHADOW E&M-EST. PATIENT-LVL III: CPT | Mod: PBBFAC,,, | Performed by: UROLOGY

## 2021-03-08 PROCEDURE — 81001 URINALYSIS AUTO W/SCOPE: CPT | Mod: S$GLB,,, | Performed by: UROLOGY

## 2021-03-08 PROCEDURE — 81001 POCT URINALYSIS, DIPSTICK OR TABLET REAGENT, AUTOMATED, WITH MICROSCOP: ICD-10-PCS | Mod: S$GLB,,, | Performed by: UROLOGY

## 2021-03-08 PROCEDURE — 99024 PR POST-OP FOLLOW-UP VISIT: ICD-10-PCS | Mod: S$GLB,,, | Performed by: UROLOGY

## 2021-03-08 PROCEDURE — 99999 PR PBB SHADOW E&M-EST. PATIENT-LVL III: ICD-10-PCS | Mod: PBBFAC,,, | Performed by: UROLOGY

## 2021-03-08 PROCEDURE — 99024 POSTOP FOLLOW-UP VISIT: CPT | Mod: S$GLB,,, | Performed by: UROLOGY

## 2021-03-15 ENCOUNTER — HOSPITAL ENCOUNTER (OUTPATIENT)
Dept: RADIOLOGY | Facility: HOSPITAL | Age: 56
Discharge: HOME | End: 2021-03-15
Attending: UROLOGY
Payer: COMMERCIAL

## 2021-03-15 DIAGNOSIS — R10.2 PERINEAL PAIN IN MALE: ICD-10-CM

## 2021-03-15 DIAGNOSIS — N50.9 DISORDER OF MALE GENITAL ORGANS, UNSPECIFIED: ICD-10-CM

## 2021-03-15 PROCEDURE — 72193 CT PELVIS W/DYE: CPT | Mod: TC

## 2021-03-15 PROCEDURE — 72193 CT PELVIS W/DYE: CPT | Mod: 26,,, | Performed by: RADIOLOGY

## 2021-03-15 PROCEDURE — 72193 CT PELVIS WITH  CONTRAST: ICD-10-PCS | Mod: 26,,, | Performed by: RADIOLOGY

## 2021-03-15 PROCEDURE — 25500020 PHARM REV CODE 255: Performed by: UROLOGY

## 2021-03-15 RX ADMIN — IOHEXOL 100 ML: 350 INJECTION, SOLUTION INTRAVENOUS at 12:03

## 2021-03-23 ENCOUNTER — OFFICE VISIT (OUTPATIENT)
Dept: UROLOGY | Facility: CLINIC | Age: 56
End: 2021-03-23
Payer: COMMERCIAL

## 2021-03-23 VITALS — HEIGHT: 74 IN | BODY MASS INDEX: 31.12 KG/M2 | WEIGHT: 242.5 LBS

## 2021-03-23 DIAGNOSIS — R10.2 PERINEAL PAIN IN MALE: ICD-10-CM

## 2021-03-23 DIAGNOSIS — N39.3 SUI (STRESS URINARY INCONTINENCE), MALE: ICD-10-CM

## 2021-03-23 DIAGNOSIS — N52.31 ERECTILE DYSFUNCTION AFTER RADICAL PROSTATECTOMY: ICD-10-CM

## 2021-03-23 DIAGNOSIS — C61 PROSTATE CANCER: Primary | ICD-10-CM

## 2021-03-23 LAB
BILIRUB SERPL-MCNC: NORMAL MG/DL
BLOOD URINE, POC: POSITIVE
COLOR, POC UA: YELLOW
GLUCOSE UR QL STRIP: NORMAL
KETONES UR QL STRIP: NORMAL
LEUKOCYTE ESTERASE URINE, POC: NORMAL
NITRITE, POC UA: NORMAL
PH, POC UA: 6
PROTEIN, POC: NORMAL
SPECIFIC GRAVITY, POC UA: 1005
UROBILINOGEN, POC UA: NORMAL

## 2021-03-23 PROCEDURE — 87086 URINE CULTURE/COLONY COUNT: CPT | Performed by: UROLOGY

## 2021-03-23 PROCEDURE — 99024 PR POST-OP FOLLOW-UP VISIT: ICD-10-PCS | Mod: S$GLB,,, | Performed by: UROLOGY

## 2021-03-23 PROCEDURE — 99999 PR PBB SHADOW E&M-EST. PATIENT-LVL III: CPT | Mod: PBBFAC,,, | Performed by: UROLOGY

## 2021-03-23 PROCEDURE — 99999 PR PBB SHADOW E&M-EST. PATIENT-LVL III: ICD-10-PCS | Mod: PBBFAC,,, | Performed by: UROLOGY

## 2021-03-23 PROCEDURE — 99024 POSTOP FOLLOW-UP VISIT: CPT | Mod: S$GLB,,, | Performed by: UROLOGY

## 2021-03-23 PROCEDURE — 81001 POCT URINALYSIS, DIPSTICK OR TABLET REAGENT, AUTOMATED, WITH MICROSCOP: ICD-10-PCS | Mod: S$GLB,,, | Performed by: UROLOGY

## 2021-03-23 PROCEDURE — 81001 URINALYSIS AUTO W/SCOPE: CPT | Mod: S$GLB,,, | Performed by: UROLOGY

## 2021-03-23 RX ORDER — TADALAFIL 20 MG/1
20 TABLET ORAL DAILY PRN
Qty: 10 TABLET | Refills: 11 | Status: SHIPPED | OUTPATIENT
Start: 2021-03-23 | End: 2022-11-14

## 2021-03-25 LAB — BACTERIA UR CULT: NO GROWTH

## 2021-04-15 ENCOUNTER — PATIENT MESSAGE (OUTPATIENT)
Dept: RESEARCH | Facility: HOSPITAL | Age: 56
End: 2021-04-15

## 2021-05-18 ENCOUNTER — TELEPHONE (OUTPATIENT)
Dept: UROLOGY | Facility: CLINIC | Age: 56
End: 2021-05-18

## 2021-06-16 ENCOUNTER — LAB VISIT (OUTPATIENT)
Dept: LAB | Facility: HOSPITAL | Age: 56
End: 2021-06-16
Attending: UROLOGY
Payer: COMMERCIAL

## 2021-06-16 DIAGNOSIS — C61 PROSTATE CANCER: ICD-10-CM

## 2021-06-16 LAB — COMPLEXED PSA SERPL-MCNC: <0.01 NG/ML (ref 0–4)

## 2021-06-16 PROCEDURE — 84153 ASSAY OF PSA TOTAL: CPT | Performed by: UROLOGY

## 2021-06-16 PROCEDURE — 36415 COLL VENOUS BLD VENIPUNCTURE: CPT | Mod: PO | Performed by: UROLOGY

## 2021-06-18 ENCOUNTER — PATIENT OUTREACH (OUTPATIENT)
Dept: ADMINISTRATIVE | Facility: OTHER | Age: 56
End: 2021-06-18

## 2021-06-22 ENCOUNTER — OFFICE VISIT (OUTPATIENT)
Dept: UROLOGY | Facility: CLINIC | Age: 56
End: 2021-06-22
Payer: COMMERCIAL

## 2021-06-22 VITALS — WEIGHT: 238.13 LBS | HEIGHT: 74 IN | BODY MASS INDEX: 30.56 KG/M2

## 2021-06-22 DIAGNOSIS — C61 PROSTATE CANCER: Primary | ICD-10-CM

## 2021-06-22 DIAGNOSIS — N39.3 SUI (STRESS URINARY INCONTINENCE), MALE: ICD-10-CM

## 2021-06-22 DIAGNOSIS — N52.31 ERECTILE DYSFUNCTION AFTER RADICAL PROSTATECTOMY: ICD-10-CM

## 2021-06-22 PROCEDURE — 99213 OFFICE O/P EST LOW 20 MIN: CPT | Mod: S$GLB,,, | Performed by: UROLOGY

## 2021-06-22 PROCEDURE — 99999 PR PBB SHADOW E&M-EST. PATIENT-LVL III: CPT | Mod: PBBFAC,,, | Performed by: UROLOGY

## 2021-06-22 PROCEDURE — 99213 PR OFFICE/OUTPT VISIT, EST, LEVL III, 20-29 MIN: ICD-10-PCS | Mod: S$GLB,,, | Performed by: UROLOGY

## 2021-06-22 PROCEDURE — 99999 PR PBB SHADOW E&M-EST. PATIENT-LVL III: ICD-10-PCS | Mod: PBBFAC,,, | Performed by: UROLOGY

## 2021-06-22 PROCEDURE — 81001 POCT URINALYSIS, DIPSTICK OR TABLET REAGENT, AUTOMATED, WITH MICROSCOP: ICD-10-PCS | Mod: S$GLB,,, | Performed by: UROLOGY

## 2021-06-22 PROCEDURE — 81001 URINALYSIS AUTO W/SCOPE: CPT | Mod: S$GLB,,, | Performed by: UROLOGY

## 2021-08-09 ENCOUNTER — TELEPHONE (OUTPATIENT)
Dept: FAMILY MEDICINE | Facility: CLINIC | Age: 56
End: 2021-08-09

## 2021-08-09 DIAGNOSIS — Z00.00 ROUTINE MEDICAL EXAM: Primary | ICD-10-CM

## 2021-08-11 ENCOUNTER — PATIENT MESSAGE (OUTPATIENT)
Dept: ADMINISTRATIVE | Facility: HOSPITAL | Age: 56
End: 2021-08-11

## 2021-08-11 ENCOUNTER — PATIENT OUTREACH (OUTPATIENT)
Dept: ADMINISTRATIVE | Facility: HOSPITAL | Age: 56
End: 2021-08-11

## 2021-08-20 ENCOUNTER — LAB VISIT (OUTPATIENT)
Dept: LAB | Facility: HOSPITAL | Age: 56
End: 2021-08-20
Attending: INTERNAL MEDICINE
Payer: COMMERCIAL

## 2021-08-20 DIAGNOSIS — Z00.00 ROUTINE MEDICAL EXAM: ICD-10-CM

## 2021-08-20 LAB
25(OH)D3+25(OH)D2 SERPL-MCNC: 21 NG/ML (ref 30–96)
ALBUMIN SERPL BCP-MCNC: 3.8 G/DL (ref 3.5–5.2)
ALP SERPL-CCNC: 55 U/L (ref 55–135)
ALT SERPL W/O P-5'-P-CCNC: 32 U/L (ref 10–44)
ANION GAP SERPL CALC-SCNC: 6 MMOL/L (ref 8–16)
AST SERPL-CCNC: 36 U/L (ref 10–40)
BASOPHILS # BLD AUTO: 0.03 K/UL (ref 0–0.2)
BASOPHILS NFR BLD: 0.6 % (ref 0–1.9)
BILIRUB SERPL-MCNC: 0.4 MG/DL (ref 0.1–1)
BUN SERPL-MCNC: 15 MG/DL (ref 6–20)
CALCIUM SERPL-MCNC: 9.4 MG/DL (ref 8.7–10.5)
CHLORIDE SERPL-SCNC: 106 MMOL/L (ref 95–110)
CHOLEST SERPL-MCNC: 188 MG/DL (ref 120–199)
CHOLEST/HDLC SERPL: 4.6 {RATIO} (ref 2–5)
CO2 SERPL-SCNC: 27 MMOL/L (ref 23–29)
CREAT SERPL-MCNC: 1 MG/DL (ref 0.5–1.4)
DIFFERENTIAL METHOD: ABNORMAL
EOSINOPHIL # BLD AUTO: 0.2 K/UL (ref 0–0.5)
EOSINOPHIL NFR BLD: 4.4 % (ref 0–8)
ERYTHROCYTE [DISTWIDTH] IN BLOOD BY AUTOMATED COUNT: 14.6 % (ref 11.5–14.5)
EST. GFR  (AFRICAN AMERICAN): >60 ML/MIN/1.73 M^2
EST. GFR  (NON AFRICAN AMERICAN): >60 ML/MIN/1.73 M^2
ESTIMATED AVG GLUCOSE: 108 MG/DL (ref 68–131)
GLUCOSE SERPL-MCNC: 89 MG/DL (ref 70–110)
HBA1C MFR BLD: 5.4 % (ref 4–5.6)
HCT VFR BLD AUTO: 46.5 % (ref 40–54)
HDLC SERPL-MCNC: 41 MG/DL (ref 40–75)
HDLC SERPL: 21.8 % (ref 20–50)
HGB BLD-MCNC: 15.1 G/DL (ref 14–18)
IMM GRANULOCYTES # BLD AUTO: 0.01 K/UL (ref 0–0.04)
IMM GRANULOCYTES NFR BLD AUTO: 0.2 % (ref 0–0.5)
LDLC SERPL CALC-MCNC: 124.8 MG/DL (ref 63–159)
LYMPHOCYTES # BLD AUTO: 1.4 K/UL (ref 1–4.8)
LYMPHOCYTES NFR BLD: 29 % (ref 18–48)
MCH RBC QN AUTO: 29 PG (ref 27–31)
MCHC RBC AUTO-ENTMCNC: 32.5 G/DL (ref 32–36)
MCV RBC AUTO: 89 FL (ref 82–98)
MONOCYTES # BLD AUTO: 0.5 K/UL (ref 0.3–1)
MONOCYTES NFR BLD: 10.2 % (ref 4–15)
NEUTROPHILS # BLD AUTO: 2.7 K/UL (ref 1.8–7.7)
NEUTROPHILS NFR BLD: 55.6 % (ref 38–73)
NONHDLC SERPL-MCNC: 147 MG/DL
NRBC BLD-RTO: 0 /100 WBC
PLATELET # BLD AUTO: 220 K/UL (ref 150–450)
PMV BLD AUTO: 10.7 FL (ref 9.2–12.9)
POTASSIUM SERPL-SCNC: 4.2 MMOL/L (ref 3.5–5.1)
PROT SERPL-MCNC: 7.1 G/DL (ref 6–8.4)
RBC # BLD AUTO: 5.2 M/UL (ref 4.6–6.2)
SODIUM SERPL-SCNC: 139 MMOL/L (ref 136–145)
TRIGL SERPL-MCNC: 111 MG/DL (ref 30–150)
TSH SERPL DL<=0.005 MIU/L-ACNC: 1.55 UIU/ML (ref 0.4–4)
WBC # BLD AUTO: 4.82 K/UL (ref 3.9–12.7)

## 2021-08-20 PROCEDURE — 83036 HEMOGLOBIN GLYCOSYLATED A1C: CPT | Performed by: INTERNAL MEDICINE

## 2021-08-20 PROCEDURE — 80053 COMPREHEN METABOLIC PANEL: CPT | Performed by: INTERNAL MEDICINE

## 2021-08-20 PROCEDURE — 80061 LIPID PANEL: CPT | Performed by: INTERNAL MEDICINE

## 2021-08-20 PROCEDURE — 36415 COLL VENOUS BLD VENIPUNCTURE: CPT | Mod: PO | Performed by: INTERNAL MEDICINE

## 2021-08-20 PROCEDURE — 82306 VITAMIN D 25 HYDROXY: CPT | Performed by: INTERNAL MEDICINE

## 2021-08-20 PROCEDURE — 84443 ASSAY THYROID STIM HORMONE: CPT | Performed by: INTERNAL MEDICINE

## 2021-08-20 PROCEDURE — 85025 COMPLETE CBC W/AUTO DIFF WBC: CPT | Performed by: INTERNAL MEDICINE

## 2021-08-25 ENCOUNTER — OFFICE VISIT (OUTPATIENT)
Dept: FAMILY MEDICINE | Facility: CLINIC | Age: 56
End: 2021-08-25
Payer: COMMERCIAL

## 2021-08-25 VITALS
HEART RATE: 57 BPM | WEIGHT: 242.31 LBS | BODY MASS INDEX: 31.1 KG/M2 | HEIGHT: 74 IN | SYSTOLIC BLOOD PRESSURE: 134 MMHG | DIASTOLIC BLOOD PRESSURE: 88 MMHG | OXYGEN SATURATION: 97 % | TEMPERATURE: 99 F

## 2021-08-25 DIAGNOSIS — Z00.00 ROUTINE MEDICAL EXAM: Primary | ICD-10-CM

## 2021-08-25 DIAGNOSIS — Z86.010 HISTORY OF COLONIC POLYPS: ICD-10-CM

## 2021-08-25 DIAGNOSIS — I10 ESSENTIAL HYPERTENSION: ICD-10-CM

## 2021-08-25 DIAGNOSIS — G47.09 OTHER INSOMNIA: ICD-10-CM

## 2021-08-25 DIAGNOSIS — C61 PROSTATE CANCER: ICD-10-CM

## 2021-08-25 DIAGNOSIS — G47.33 OSA (OBSTRUCTIVE SLEEP APNEA): ICD-10-CM

## 2021-08-25 DIAGNOSIS — E55.9 VITAMIN D DEFICIENCY DISEASE: ICD-10-CM

## 2021-08-25 DIAGNOSIS — E78.5 HYPERLIPIDEMIA, UNSPECIFIED HYPERLIPIDEMIA TYPE: ICD-10-CM

## 2021-08-25 PROCEDURE — 99999 PR PBB SHADOW E&M-EST. PATIENT-LVL III: CPT | Mod: PBBFAC,,, | Performed by: INTERNAL MEDICINE

## 2021-08-25 PROCEDURE — 99396 PR PREVENTIVE VISIT,EST,40-64: ICD-10-PCS | Mod: S$GLB,,, | Performed by: INTERNAL MEDICINE

## 2021-08-25 PROCEDURE — 99999 PR PBB SHADOW E&M-EST. PATIENT-LVL III: ICD-10-PCS | Mod: PBBFAC,,, | Performed by: INTERNAL MEDICINE

## 2021-08-25 PROCEDURE — 99396 PREV VISIT EST AGE 40-64: CPT | Mod: S$GLB,,, | Performed by: INTERNAL MEDICINE

## 2021-08-25 RX ORDER — TRAZODONE HYDROCHLORIDE 50 MG/1
50 TABLET ORAL NIGHTLY PRN
Qty: 90 TABLET | Refills: 12 | Status: SHIPPED | OUTPATIENT
Start: 2021-08-25 | End: 2022-09-05 | Stop reason: SDUPTHER

## 2021-08-25 RX ORDER — VALSARTAN 80 MG/1
80 TABLET ORAL DAILY
Qty: 90 TABLET | Refills: 3 | Status: SHIPPED | OUTPATIENT
Start: 2021-08-25 | End: 2021-11-03 | Stop reason: SDUPTHER

## 2021-08-25 RX ORDER — TAMSULOSIN HYDROCHLORIDE 0.4 MG/1
0.4 CAPSULE ORAL DAILY
Qty: 90 CAPSULE | Refills: 9 | Status: SHIPPED | OUTPATIENT
Start: 2021-08-25 | End: 2021-08-25

## 2021-08-25 RX ORDER — TAMSULOSIN HYDROCHLORIDE 0.4 MG/1
0.4 CAPSULE ORAL DAILY
COMMUNITY
End: 2021-08-25 | Stop reason: SDUPTHER

## 2021-09-09 ENCOUNTER — LAB VISIT (OUTPATIENT)
Dept: LAB | Facility: HOSPITAL | Age: 56
End: 2021-09-09
Attending: UROLOGY
Payer: COMMERCIAL

## 2021-09-09 DIAGNOSIS — C61 PROSTATE CANCER: ICD-10-CM

## 2021-09-09 LAB — COMPLEXED PSA SERPL-MCNC: <0.01 NG/ML (ref 0–4)

## 2021-09-09 PROCEDURE — 84153 ASSAY OF PSA TOTAL: CPT | Performed by: UROLOGY

## 2021-09-09 PROCEDURE — 36415 COLL VENOUS BLD VENIPUNCTURE: CPT | Mod: PO | Performed by: UROLOGY

## 2021-09-21 ENCOUNTER — OFFICE VISIT (OUTPATIENT)
Dept: UROLOGY | Facility: CLINIC | Age: 56
End: 2021-09-21
Payer: COMMERCIAL

## 2021-09-21 VITALS — HEIGHT: 74 IN | BODY MASS INDEX: 31.51 KG/M2 | WEIGHT: 245.56 LBS

## 2021-09-21 DIAGNOSIS — C61 PROSTATE CANCER: Primary | ICD-10-CM

## 2021-09-21 DIAGNOSIS — N39.3 SUI (STRESS URINARY INCONTINENCE), MALE: ICD-10-CM

## 2021-09-21 DIAGNOSIS — N52.31 ERECTILE DYSFUNCTION AFTER RADICAL PROSTATECTOMY: ICD-10-CM

## 2021-09-21 LAB
BILIRUB SERPL-MCNC: NORMAL MG/DL
BLOOD URINE, POC: NORMAL
COLOR, POC UA: YELLOW
GLUCOSE UR QL STRIP: NORMAL
KETONES UR QL STRIP: NORMAL
LEUKOCYTE ESTERASE URINE, POC: NORMAL
NITRITE, POC UA: NORMAL
PH, POC UA: 5
PROTEIN, POC: NORMAL
SPECIFIC GRAVITY, POC UA: 1020
UROBILINOGEN, POC UA: NORMAL

## 2021-09-21 PROCEDURE — 99999 PR PBB SHADOW E&M-EST. PATIENT-LVL III: CPT | Mod: PBBFAC,,, | Performed by: UROLOGY

## 2021-09-21 PROCEDURE — 99213 OFFICE O/P EST LOW 20 MIN: CPT | Mod: S$GLB,,, | Performed by: UROLOGY

## 2021-09-21 PROCEDURE — 81001 URINALYSIS AUTO W/SCOPE: CPT | Mod: S$GLB,,, | Performed by: UROLOGY

## 2021-09-21 PROCEDURE — 99999 PR PBB SHADOW E&M-EST. PATIENT-LVL III: ICD-10-PCS | Mod: PBBFAC,,, | Performed by: UROLOGY

## 2021-09-21 PROCEDURE — 81001 PR  URINALYSIS, AUTO, W/SCOPE: ICD-10-PCS | Mod: S$GLB,,, | Performed by: UROLOGY

## 2021-09-21 PROCEDURE — 99213 PR OFFICE/OUTPT VISIT, EST, LEVL III, 20-29 MIN: ICD-10-PCS | Mod: S$GLB,,, | Performed by: UROLOGY

## 2021-11-03 ENCOUNTER — TELEPHONE (OUTPATIENT)
Dept: FAMILY MEDICINE | Facility: CLINIC | Age: 56
End: 2021-11-03
Payer: COMMERCIAL

## 2021-11-03 DIAGNOSIS — I10 ESSENTIAL HYPERTENSION: Primary | ICD-10-CM

## 2021-11-03 RX ORDER — VALSARTAN 80 MG/1
80 TABLET ORAL DAILY
Qty: 90 TABLET | Refills: 3 | Status: SHIPPED | OUTPATIENT
Start: 2021-11-03 | End: 2021-11-04 | Stop reason: SDUPTHER

## 2021-11-04 ENCOUNTER — TELEPHONE (OUTPATIENT)
Dept: FAMILY MEDICINE | Facility: CLINIC | Age: 56
End: 2021-11-04
Payer: COMMERCIAL

## 2021-11-04 RX ORDER — VALSARTAN 160 MG/1
160 TABLET ORAL DAILY
Qty: 90 TABLET | Refills: 3 | Status: SHIPPED | OUTPATIENT
Start: 2021-11-04 | End: 2021-11-17 | Stop reason: SDUPTHER

## 2021-11-05 ENCOUNTER — TELEPHONE (OUTPATIENT)
Dept: FAMILY MEDICINE | Facility: CLINIC | Age: 56
End: 2021-11-05
Payer: COMMERCIAL

## 2021-11-10 ENCOUNTER — PATIENT MESSAGE (OUTPATIENT)
Dept: FAMILY MEDICINE | Facility: CLINIC | Age: 56
End: 2021-11-10
Payer: COMMERCIAL

## 2021-11-10 DIAGNOSIS — I10 ESSENTIAL HYPERTENSION: ICD-10-CM

## 2021-11-17 RX ORDER — VALSARTAN 320 MG/1
320 TABLET ORAL DAILY
Qty: 90 TABLET | Refills: 3 | Status: SHIPPED | OUTPATIENT
Start: 2021-11-17 | End: 2022-07-29

## 2021-12-06 ENCOUNTER — PATIENT MESSAGE (OUTPATIENT)
Dept: FAMILY MEDICINE | Facility: CLINIC | Age: 56
End: 2021-12-06
Payer: COMMERCIAL

## 2021-12-06 RX ORDER — AMLODIPINE BESYLATE 5 MG/1
5 TABLET ORAL DAILY
Qty: 30 TABLET | Refills: 11 | Status: SHIPPED | OUTPATIENT
Start: 2021-12-06 | End: 2022-07-29

## 2021-12-12 ENCOUNTER — PATIENT MESSAGE (OUTPATIENT)
Dept: UROLOGY | Facility: CLINIC | Age: 56
End: 2021-12-12
Payer: COMMERCIAL

## 2021-12-12 DIAGNOSIS — C61 PROSTATE CANCER: Primary | ICD-10-CM

## 2021-12-14 ENCOUNTER — LAB VISIT (OUTPATIENT)
Dept: LAB | Facility: HOSPITAL | Age: 56
End: 2021-12-14
Attending: UROLOGY
Payer: COMMERCIAL

## 2021-12-14 DIAGNOSIS — C61 PROSTATE CANCER: ICD-10-CM

## 2021-12-14 LAB — TESTOST SERPL-MCNC: 390 NG/DL (ref 304–1227)

## 2021-12-14 PROCEDURE — 84403 ASSAY OF TOTAL TESTOSTERONE: CPT | Performed by: UROLOGY

## 2022-01-26 ENCOUNTER — TELEPHONE (OUTPATIENT)
Dept: FAMILY MEDICINE | Facility: CLINIC | Age: 57
End: 2022-01-26
Payer: COMMERCIAL

## 2022-01-26 ENCOUNTER — TELEPHONE (OUTPATIENT)
Dept: ENDOSCOPY | Facility: HOSPITAL | Age: 57
End: 2022-01-26
Payer: COMMERCIAL

## 2022-01-26 ENCOUNTER — PATIENT MESSAGE (OUTPATIENT)
Dept: ENDOSCOPY | Facility: HOSPITAL | Age: 57
End: 2022-01-26
Payer: COMMERCIAL

## 2022-01-26 DIAGNOSIS — Z01.818 PRE-OP TESTING: ICD-10-CM

## 2022-01-26 DIAGNOSIS — Z12.11 SPECIAL SCREENING FOR MALIGNANT NEOPLASMS, COLON: Primary | ICD-10-CM

## 2022-01-26 RX ORDER — SODIUM, POTASSIUM,MAG SULFATES 17.5-3.13G
1 SOLUTION, RECONSTITUTED, ORAL ORAL DAILY
Qty: 1 KIT | Refills: 0 | Status: SHIPPED | OUTPATIENT
Start: 2022-01-26 | End: 2022-01-28

## 2022-01-26 NOTE — TELEPHONE ENCOUNTER
----- Message from Bertha Allison MA sent at 1/26/2022 11:18 AM CST -----  Regarding: pt requesting a call back  Name of Who is Calling: BALAJI SOTO [[id number]]           What is the request in detail: pt requesting a call back needs to speak with someone in office about scheduling procedure            Can the clinic reply by [facility]: no            [facility] [phone]

## 2022-03-03 ENCOUNTER — PATIENT MESSAGE (OUTPATIENT)
Dept: GASTROENTEROLOGY | Facility: CLINIC | Age: 57
End: 2022-03-03
Payer: COMMERCIAL

## 2022-03-03 ENCOUNTER — TELEPHONE (OUTPATIENT)
Dept: GASTROENTEROLOGY | Facility: CLINIC | Age: 57
End: 2022-03-03
Payer: COMMERCIAL

## 2022-03-03 DIAGNOSIS — Z12.11 COLON CANCER SCREENING: Primary | ICD-10-CM

## 2022-03-03 RX ORDER — SODIUM, POTASSIUM,MAG SULFATES 17.5-3.13G
1 SOLUTION, RECONSTITUTED, ORAL ORAL DAILY
Qty: 1 KIT | Refills: 0 | Status: SHIPPED | OUTPATIENT
Start: 2022-03-03 | End: 2022-03-05

## 2022-03-03 NOTE — TELEPHONE ENCOUNTER
----- Message from Latosha Dubose sent at 3/3/2022 11:17 AM CST -----  Regarding: ,medication  Name of Who is Calling: BALAJI SOTO [[id number]]      What is the request in detail: Patient is requesting a call back concerning the medication for his medication for his colonoscopy  he states he needs a hard copy of the prescription       Can the clinic reply by [facility]:no       What Number to Call Back if not in [facility]: [phone]

## 2022-03-03 NOTE — TELEPHONE ENCOUNTER
If patient needs a hard copy, it will need to come from Dr. Mcqeuen because she will need to sign it.

## 2022-03-07 ENCOUNTER — LAB VISIT (OUTPATIENT)
Dept: FAMILY MEDICINE | Facility: CLINIC | Age: 57
End: 2022-03-07
Payer: COMMERCIAL

## 2022-03-07 DIAGNOSIS — Z01.818 PRE-OP TESTING: ICD-10-CM

## 2022-03-07 PROCEDURE — U0005 INFEC AGEN DETEC AMPLI PROBE: HCPCS | Performed by: FAMILY MEDICINE

## 2022-03-07 PROCEDURE — U0003 INFECTIOUS AGENT DETECTION BY NUCLEIC ACID (DNA OR RNA); SEVERE ACUTE RESPIRATORY SYNDROME CORONAVIRUS 2 (SARS-COV-2) (CORONAVIRUS DISEASE [COVID-19]), AMPLIFIED PROBE TECHNIQUE, MAKING USE OF HIGH THROUGHPUT TECHNOLOGIES AS DESCRIBED BY CMS-2020-01-R: HCPCS | Performed by: FAMILY MEDICINE

## 2022-03-08 LAB
SARS-COV-2 RNA RESP QL NAA+PROBE: NOT DETECTED
SARS-COV-2- CYCLE NUMBER: NORMAL

## 2022-03-10 ENCOUNTER — HOSPITAL ENCOUNTER (OUTPATIENT)
Facility: HOSPITAL | Age: 57
Discharge: HOME | End: 2022-03-10
Attending: INTERNAL MEDICINE | Admitting: INTERNAL MEDICINE
Payer: COMMERCIAL

## 2022-03-10 ENCOUNTER — ANESTHESIA EVENT (OUTPATIENT)
Dept: ENDOSCOPY | Facility: HOSPITAL | Age: 57
End: 2022-03-10
Payer: COMMERCIAL

## 2022-03-10 ENCOUNTER — ANESTHESIA (OUTPATIENT)
Dept: ENDOSCOPY | Facility: HOSPITAL | Age: 57
End: 2022-03-10
Payer: COMMERCIAL

## 2022-03-10 VITALS
HEART RATE: 60 BPM | RESPIRATION RATE: 14 BRPM | TEMPERATURE: 98 F | SYSTOLIC BLOOD PRESSURE: 159 MMHG | DIASTOLIC BLOOD PRESSURE: 91 MMHG | OXYGEN SATURATION: 98 %

## 2022-03-10 DIAGNOSIS — Z12.11 SCREEN FOR COLON CANCER: ICD-10-CM

## 2022-03-10 PROCEDURE — 88305 TISSUE EXAM BY PATHOLOGIST: CPT | Performed by: PATHOLOGY

## 2022-03-10 PROCEDURE — D9220A PRA ANESTHESIA: ICD-10-PCS | Mod: 33,CRNA,, | Performed by: STUDENT IN AN ORGANIZED HEALTH CARE EDUCATION/TRAINING PROGRAM

## 2022-03-10 PROCEDURE — 37000008 HC ANESTHESIA 1ST 15 MINUTES: Performed by: INTERNAL MEDICINE

## 2022-03-10 PROCEDURE — 37000009 HC ANESTHESIA EA ADD 15 MINS: Performed by: INTERNAL MEDICINE

## 2022-03-10 PROCEDURE — D9220A PRA ANESTHESIA: Mod: 33,ANES,, | Performed by: ANESTHESIOLOGY

## 2022-03-10 PROCEDURE — 45380 COLONOSCOPY AND BIOPSY: CPT | Mod: PT | Performed by: STUDENT IN AN ORGANIZED HEALTH CARE EDUCATION/TRAINING PROGRAM

## 2022-03-10 PROCEDURE — 88305 TISSUE EXAM BY PATHOLOGIST: CPT | Mod: 26,,, | Performed by: PATHOLOGY

## 2022-03-10 PROCEDURE — 63600175 PHARM REV CODE 636 W HCPCS: Performed by: STUDENT IN AN ORGANIZED HEALTH CARE EDUCATION/TRAINING PROGRAM

## 2022-03-10 PROCEDURE — 25000003 PHARM REV CODE 250: Performed by: STUDENT IN AN ORGANIZED HEALTH CARE EDUCATION/TRAINING PROGRAM

## 2022-03-10 PROCEDURE — 88305 TISSUE EXAM BY PATHOLOGIST: ICD-10-PCS | Mod: 26,,, | Performed by: PATHOLOGY

## 2022-03-10 PROCEDURE — 45380 PR COLONOSCOPY,BIOPSY: ICD-10-PCS | Mod: 33,,, | Performed by: STUDENT IN AN ORGANIZED HEALTH CARE EDUCATION/TRAINING PROGRAM

## 2022-03-10 PROCEDURE — 25000003 PHARM REV CODE 250: Performed by: INTERNAL MEDICINE

## 2022-03-10 PROCEDURE — D9220A PRA ANESTHESIA: Mod: 33,CRNA,, | Performed by: STUDENT IN AN ORGANIZED HEALTH CARE EDUCATION/TRAINING PROGRAM

## 2022-03-10 PROCEDURE — 45380 COLONOSCOPY AND BIOPSY: CPT | Mod: 33,,, | Performed by: STUDENT IN AN ORGANIZED HEALTH CARE EDUCATION/TRAINING PROGRAM

## 2022-03-10 PROCEDURE — 27201012 HC FORCEPS, HOT/COLD, DISP: Performed by: INTERNAL MEDICINE

## 2022-03-10 PROCEDURE — D9220A PRA ANESTHESIA: ICD-10-PCS | Mod: 33,ANES,, | Performed by: ANESTHESIOLOGY

## 2022-03-10 RX ORDER — SODIUM CHLORIDE 9 MG/ML
INJECTION, SOLUTION INTRAVENOUS CONTINUOUS
Status: DISCONTINUED | OUTPATIENT
Start: 2022-03-10 | End: 2022-03-10 | Stop reason: HOSPADM

## 2022-03-10 RX ORDER — LIDOCAINE HYDROCHLORIDE 20 MG/ML
INJECTION, SOLUTION EPIDURAL; INFILTRATION; INTRACAUDAL; PERINEURAL
Status: DISCONTINUED
Start: 2022-03-10 | End: 2022-03-10 | Stop reason: HOSPADM

## 2022-03-10 RX ORDER — LIDOCAINE HYDROCHLORIDE 20 MG/ML
INJECTION INTRAVENOUS
Status: DISCONTINUED | OUTPATIENT
Start: 2022-03-10 | End: 2022-03-10

## 2022-03-10 RX ORDER — PROPOFOL 10 MG/ML
VIAL (ML) INTRAVENOUS
Status: DISCONTINUED | OUTPATIENT
Start: 2022-03-10 | End: 2022-03-10

## 2022-03-10 RX ORDER — PROPOFOL 10 MG/ML
INJECTION, EMULSION INTRAVENOUS
Status: DISCONTINUED
Start: 2022-03-10 | End: 2022-03-10 | Stop reason: HOSPADM

## 2022-03-10 RX ADMIN — PROPOFOL 30 MG: 10 INJECTION, EMULSION INTRAVENOUS at 08:03

## 2022-03-10 RX ADMIN — PROPOFOL 50 MG: 10 INJECTION, EMULSION INTRAVENOUS at 08:03

## 2022-03-10 RX ADMIN — PROPOFOL 100 MG: 10 INJECTION, EMULSION INTRAVENOUS at 08:03

## 2022-03-10 RX ADMIN — SODIUM CHLORIDE: 0.9 INJECTION, SOLUTION INTRAVENOUS at 08:03

## 2022-03-10 RX ADMIN — PROPOFOL 40 MG: 10 INJECTION, EMULSION INTRAVENOUS at 08:03

## 2022-03-10 RX ADMIN — PROPOFOL 20 MG: 10 INJECTION, EMULSION INTRAVENOUS at 08:03

## 2022-03-10 RX ADMIN — LIDOCAINE HYDROCHLORIDE 100 MG: 20 INJECTION, SOLUTION INTRAVENOUS at 08:03

## 2022-03-10 NOTE — ANESTHESIA POSTPROCEDURE EVALUATION
Anesthesia Post Evaluation    Patient: Chapo Ohara    Procedure(s) Performed: Procedure(s) (LRB):  COLONOSCOPY (N/A)    Final Anesthesia Type: general      Patient location during evaluation: GI PACU  Patient participation: Yes- Able to Participate  Level of consciousness: awake and alert  Post-procedure vital signs: reviewed and stable  Pain management: adequate  Airway patency: patent    PONV status at discharge: No PONV  Anesthetic complications: no      Cardiovascular status: hemodynamically stable  Respiratory status: unassisted and spontaneous ventilation  Hydration status: euvolemic  Follow-up not needed.          Vitals Value Taken Time   /86 03/10/22 0915   Temp 36.4 °C (97.6 °F) 03/10/22 0900   Pulse 74 03/10/22 0915   Resp 15 03/10/22 0915   SpO2 97 % 03/10/22 0915         No case tracking events are documented in the log.      Pain/Alec Score: Alec Score: 10 (3/10/2022  9:15 AM)

## 2022-03-10 NOTE — TRANSFER OF CARE
Anesthesia Transfer of Care Note    Patient: Chapo Ohara    Procedure(s) Performed: Procedure(s) (LRB):  COLONOSCOPY (N/A)    Patient location: GI    Anesthesia Type: general    Transport from OR: Transported from OR on room air with adequate spontaneous ventilation    Post pain: adequate analgesia    Post assessment: no apparent anesthetic complications and tolerated procedure well    Post vital signs: stable    Level of consciousness: responds to stimulation    Nausea/Vomiting: no nausea/vomiting    Complications: none    Transfer of care protocol was followed      Last vitals:   Visit Vitals  /74   Pulse 65   Temp 36.4 °C (97.6 °F) (Oral)   Resp 18   SpO2 95%

## 2022-03-10 NOTE — ANESTHESIA PREPROCEDURE EVALUATION
03/10/2022  Chapo Ohara is a 56 y.o., male.      Pre-op Assessment    I have reviewed the Patient Summary Reports.     I have reviewed the Nursing Notes.       Review of Systems  Anesthesia Hx:  No problems with previous Anesthesia  Denies Family Hx of Anesthesia complications.   Denies Personal Hx of Anesthesia complications.   Social:  Non-Smoker, No Alcohol Use    Hematology/Oncology:         -- Cancer in past history: Oncology Comments: Prostate s/p prostatectomy     Cardiovascular:   Exercise tolerance: good Hypertension Denies MI.   Denies Dysrhythmias.  hyperlipidemia    Pulmonary:   Sleep Apnea    Renal/:  Renal/ Normal     Hepatic/GI:  Hepatic/GI Normal    Neurological:  Neurology Normal    Endocrine:  Endocrine Normal        Physical Exam  General: Well nourished, Cooperative and Alert    Airway:  Mallampati: II   Mouth Opening: Normal  TM Distance: 4 - 6 cm  Tongue: Normal  Neck ROM: Normal ROM    Dental:  Intact    Chest/Lungs:  Clear to auscultation, Normal Respiratory Rate    Heart:  Rate: Normal  Rhythm: Regular Rhythm      Wt Readings from Last 3 Encounters:   09/21/21 111.4 kg (245 lb 9.5 oz)   08/25/21 109.9 kg (242 lb 4.6 oz)   06/22/21 108 kg (238 lb 1.6 oz)     Temp Readings from Last 3 Encounters:   08/25/21 37.1 °C (98.8 °F) (Oral)   01/21/21 36.8 °C (98.2 °F)   01/12/21 36.2 °C (97.2 °F)     BP Readings from Last 3 Encounters:   08/25/21 134/88   01/21/21 (!) 145/81   01/12/21 113/82     Pulse Readings from Last 3 Encounters:   08/25/21 (!) 57   01/21/21 67   01/12/21 72     3/7/2022 COVID negative    Anesthesia Plan  Type of Anesthesia, risks & benefits discussed:    Anesthesia Type: Gen Natural Airway, MAC  Intra-op Monitoring Plan: Standard ASA Monitors  Post Op Pain Control Plan: multimodal analgesia and IV/PO Opioids PRN  Induction:  IV  Informed Consent: Informed consent  signed with the Patient and all parties understand the risks and agree with anesthesia plan.  All questions answered.   ASA Score: 2  Day of Surgery Review of History & Physical: H&P Update referred to the surgeon/provider.    Ready For Surgery From Anesthesia Perspective.     .

## 2022-03-10 NOTE — H&P
Short Stay Endoscopy History and Physical    PCP - Prashanth Torres MD    Procedure - Colonoscopy  ASA - per anesthesia  Mallampati - per anesthesia  History of Anesthesia problems - no  Family history Anesthesia problems - no   Plan of anesthesia - General    HPI:  This is a 56 y.o. male here for evaluation of : asymptomatic screening exam and personal history of colon polyps      ROS:  Constitutional: No fevers, chills, No weight loss  CV: No chest pain  Pulm: No cough, No shortness of breath  GI: see HPI  Derm: No rash    Medical History:  has a past medical history of History of colonic polyps (4/11/2017), HTN (hypertension) (3/7/2014), Hyperlipidemia (03/07/2014), Prostate cancer, and Sleep apnea (9/10/2012).    Surgical History:  has a past surgical history that includes Colonoscopy (N/A, 12/11/2015); trus/bx 2020; Tumor removal; Prostate biopsy; Robot-assisted laparoscopic prostatectomy using da Natty Xi (N/A, 1/20/2021); and Robot-assisted laparoscopic pelvic lymphadenectomy using da Natty Xi (Bilateral, 1/20/2021).    Family History: family history includes Hypertension in his father; No Known Problems in his mother.. Otherwise no colon cancer, inflammatory bowel disease, or GI malignancies.    Social History:  reports that he has never smoked. He has never used smokeless tobacco. He reports that he does not drink alcohol and does not use drugs.    Review of patient's allergies indicates:  No Known Allergies    Medications:   Medications Prior to Admission   Medication Sig Dispense Refill Last Dose    amLODIPine (NORVASC) 5 MG tablet Take 1 tablet (5 mg total) by mouth once daily. 30 tablet 11     oxyCODONE-acetaminophen (PERCOCET) 5-325 mg per tablet Take 1 tablet by mouth every 4 (four) hours as needed for Pain. 6 tablet 0     polyethylene glycol (GLYCOLAX) 17 gram PwPk Take 17 g by mouth once daily. 10 packet 0     tadalafiL (CIALIS) 20 MG Tab Take 1 tablet (20 mg total) by mouth daily as  needed (erectile dysfunction). 10 tablet 11     traZODone (DESYREL) 50 MG tablet Take 1 tablet (50 mg total) by mouth nightly as needed for Insomnia. 90 tablet 12     valsartan (DIOVAN) 320 MG tablet Take 1 tablet (320 mg total) by mouth once daily. 90 tablet 3          Physical Exam:    Vital Signs: There were no vitals filed for this visit.    Gen: NAD, lying comfortably  HENT: NCAT, oropharynx clear  Eyes: anicteric sclerae, EOMI grossly  Neck: supple, no visible masses/goiter  Cardiac: RRR  Lungs: non-labored breathing  Abd: soft, NT/ND, normoactive BS  Ext: no LE edema, warm, well perfused  Skin: skin intact on exposed body parts, no visible rashes, lesions  Neuro: A&Ox4, neuro exam grossly intact, moves all extremities  Psych: appropriate mood, affect        Labs:  Lab Results   Component Value Date    WBC 4.82 08/20/2021    HGB 15.1 08/20/2021    HCT 46.5 08/20/2021     08/20/2021    CHOL 188 08/20/2021    TRIG 111 08/20/2021    HDL 41 08/20/2021    ALT 32 08/20/2021    AST 36 08/20/2021     08/20/2021    K 4.2 08/20/2021     08/20/2021    CREATININE 1.0 08/20/2021    BUN 15 08/20/2021    CO2 27 08/20/2021    TSH 1.549 08/20/2021    PSA 5.1 (H) 06/26/2020    HGBA1C 5.4 08/20/2021       Plan:  Colonoscopy for surveillance due to a personal history of colon polyps.    I have explained the risks and benefits of endoscopy procedures to the patient including but not limited to bleeding, perforation, infection, and death.  The patient was asked if they understand and allowed to ask any further questions to their satisfaction.    Natalia Mcqueen MD

## 2022-03-10 NOTE — PROVATION PATIENT INSTRUCTIONS
Discharge Summary/Instructions after an Endoscopic Procedure  Patient Name: Chapo Ohara  Patient MRN: [id number]  Patient YOB: 1965  Thursday, March 10, 2022  Laverne Smith MD  Dear patient,  As a result of recent federal legislation (The Federal Cures Act), you may   receive lab or pathology results from your procedure in your [facility]   account before your physician is able to contact you. Your physician or   their representative will relay the results to you with their   recommendations at their soonest availability.  Thank you,  RESTRICTIONS:  During your procedure today, you received medications for sedation.  These   medications may affect your judgment, balance and coordination.  Therefore,   for 24 hours, you have the following restrictions:   - DO NOT drive a car, operate machinery, make legal/financial decisions,   sign important papers or drink alcohol.    ACTIVITY:  Today: no heavy lifting, straining or running due to procedural   sedation/anesthesia.  The following day: return to full activity including work.  DIET:  Eat and drink normally unless instructed otherwise.     TREATMENT FOR COMMON SIDE EFFECTS:  - Mild abdominal pain, nausea, belching, bloating or excessive gas:  rest,   eat lightly and use a heating pad.  - Sore Throat: treat with throat lozenges and/or gargle with warm salt   water.  - Because air was used during the procedure, expelling large amounts of air   from your rectum or belching is normal.  - If a bowel prep was taken, you may not have a bowel movement for 1-3 days.    This is normal.  SYMPTOMS TO WATCH FOR AND REPORT TO YOUR PHYSICIAN:  1. Abdominal pain or bloating, other than gas cramps.  2. Chest pain.  3. Back pain.  4. Signs of infection such as: chills or fever occurring within 24 hours   after the procedure.  5. Rectal bleeding, which would show as bright red, maroon, or black stools.   (A tablespoon of blood from the rectum is not serious, especially if    hemorrhoids are present.)  6. Vomiting.  7. Weakness or dizziness.  GO DIRECTLY TO THE NEAREST EMERGENCY ROOM IF YOU HAVE ANY OF THE FOLLOWING:      Difficulty breathing              Chills and/or fever over 101 F   Persistent vomiting and/or vomiting blood   Severe abdominal pain   Severe chest pain   Black, tarry stools   Bleeding- more than one tablespoon   Any other symptom or condition that you feel may need urgent attention  Your doctor recommends these additional instructions:  If any biopsies were taken, your doctors clinic will contact you in 1 to 2   weeks with any results.  - Discharge patient to home (ambulatory).   - Resume regular diet.   - Continue present medications.   - Await pathology results.   - Repeat colonoscopy in 7 years for surveillance.  For questions, problems or results please call your physician - Laverne Smith MD at Work:  [phone].  [facility] can be reached at [phone]     IF A COMPLICATION OR EMERGENCY SITUATION ARISES AND YOU ARE UNABLE TO REACH   YOUR PHYSICIAN - GO DIRECTLY TO THE EMERGENCY ROOM.  Laverne Smith MD  3/10/2022 9:00:05 AM  This report has been verified and signed electronically.  Dear patient,  As a result of recent federal legislation (The Federal Cures Act), you may   receive lab or pathology results from your procedure in your [facility]   account before your physician is able to contact you. Your physician or   their representative will relay the results to you with their   recommendations at their soonest availability.  Thank you,  PROVATION

## 2022-03-14 LAB
FINAL PATHOLOGIC DIAGNOSIS: NORMAL
GROSS: NORMAL
Lab: NORMAL

## 2022-05-02 ENCOUNTER — OFFICE VISIT (OUTPATIENT)
Dept: UROLOGY | Facility: CLINIC | Age: 57
End: 2022-05-02
Payer: COMMERCIAL

## 2022-05-02 VITALS — WEIGHT: 246.69 LBS | BODY MASS INDEX: 31.66 KG/M2

## 2022-05-02 DIAGNOSIS — C61 PROSTATE CANCER: Primary | ICD-10-CM

## 2022-05-02 DIAGNOSIS — N39.3 SUI (STRESS URINARY INCONTINENCE), MALE: ICD-10-CM

## 2022-05-02 DIAGNOSIS — N52.9 ED (ERECTILE DYSFUNCTION) OF ORGANIC ORIGIN: ICD-10-CM

## 2022-05-02 DIAGNOSIS — N52.31 ERECTILE DYSFUNCTION AFTER RADICAL PROSTATECTOMY: ICD-10-CM

## 2022-05-02 PROCEDURE — 99999 PR PBB SHADOW E&M-EST. PATIENT-LVL III: CPT | Mod: PBBFAC,,, | Performed by: UROLOGY

## 2022-05-02 PROCEDURE — 99214 PR OFFICE/OUTPT VISIT, EST, LEVL IV, 30-39 MIN: ICD-10-PCS | Mod: S$GLB,,, | Performed by: UROLOGY

## 2022-05-02 PROCEDURE — 99999 PR PBB SHADOW E&M-EST. PATIENT-LVL III: ICD-10-PCS | Mod: PBBFAC,,, | Performed by: UROLOGY

## 2022-05-02 PROCEDURE — 99214 OFFICE O/P EST MOD 30 MIN: CPT | Mod: S$GLB,,, | Performed by: UROLOGY

## 2022-05-02 NOTE — PROGRESS NOTES
Subjective:       Patient ID: Chapo Ohara is a 57 y.o. male The patient's last visit with me was on 9/21/2021.     Chief Complaint:   Chief Complaint   Patient presents with    Erectile Dysfunction       Prostate Cancer  Patient here for post-op follow-up. Patient is status post robotic assisted laparoscopic prostatectomy on 1/20/2021.   The patient reports no problems with eating, bowel movements, voiding, or their wound. The patient is not having any pain.      He is wearing about 3 pads per day.    He has noted a bulge in his groin on the left side.  He has noted pain in his perineum when he sits a long time.    3/23/2021.  He is back with a CT of the pelvis.    6/22/2021  He is wearing a safety pad.  His leakage occurs mostly with the act of standing up.    9/21/2021  He is wearing one pad per day for safety.  He has not had the best effects with Cialis.    05/02/2022  He continues to have a left sided groin bulge.  It is most noticeable at the end of the day of long activity.   He has noted urine during an orgasm.  He is wearing one small safety pad per day.      ACTIVE MEDICAL ISSUES:  Patient Active Problem List   Diagnosis    Sleep apnea    Hyperlipidemia    HTN (hypertension)    Benign prostatic hyperplasia with urinary frequency    Cardiac murmur    History of colonic polyps    Other insomnia    Prostate cancer    Vitamin D deficiency disease    WANDA (obstructive sleep apnea)       ALLERGIES AND MEDICATIONS: updated and reviewed.  Review of patient's allergies indicates:  No Known Allergies  Current Outpatient Medications   Medication Sig    traZODone (DESYREL) 50 MG tablet Take 1 tablet (50 mg total) by mouth nightly as needed for Insomnia.    amLODIPine (NORVASC) 5 MG tablet Take 1 tablet (5 mg total) by mouth once daily.    oxyCODONE-acetaminophen (PERCOCET) 5-325 mg per tablet Take 1 tablet by mouth every 4 (four) hours as needed for Pain.    polyethylene glycol (GLYCOLAX) 17 gram PwPk  Take 17 g by mouth once daily.    tadalafiL (CIALIS) 20 MG Tab Take 1 tablet (20 mg total) by mouth daily as needed (erectile dysfunction).    valsartan (DIOVAN) 320 MG tablet Take 1 tablet (320 mg total) by mouth once daily.     No current facility-administered medications for this visit.       Review of Systems   Constitutional: Negative for activity change, fatigue, fever and unexpected weight change.   HENT: Negative for congestion.    Eyes: Negative for redness.   Respiratory: Negative for chest tightness and shortness of breath.    Cardiovascular: Negative for chest pain and leg swelling.   Gastrointestinal: Negative for abdominal pain, constipation, diarrhea, nausea and vomiting.   Genitourinary: Negative for dysuria, flank pain, frequency, hematuria, penile pain, penile swelling, scrotal swelling, testicular pain and urgency.   Musculoskeletal: Negative for arthralgias and back pain.   Neurological: Negative for dizziness and light-headedness.   Psychiatric/Behavioral: Negative for behavioral problems and confusion. The patient is not nervous/anxious.    All other systems reviewed and are negative.      Objective:      Vitals:    05/02/22 1525   Weight: 111.9 kg (246 lb 11.1 oz)     Physical Exam  Vitals and nursing note reviewed.   Constitutional:       Appearance: He is well-developed.   HENT:      Head: Normocephalic.   Eyes:      Conjunctiva/sclera: Conjunctivae normal.   Neck:      Thyroid: No thyromegaly.      Trachea: No tracheal deviation.   Cardiovascular:      Rate and Rhythm: Normal rate.      Heart sounds: Normal heart sounds.   Pulmonary:      Effort: Pulmonary effort is normal. No respiratory distress.      Breath sounds: Normal breath sounds. No wheezing.   Abdominal:      General: Bowel sounds are normal.      Palpations: Abdomen is soft.      Tenderness: There is no abdominal tenderness. There is no rebound.      Hernia: No hernia is present.   Musculoskeletal:         General: No  tenderness. Normal range of motion.      Cervical back: Normal range of motion and neck supple.   Lymphadenopathy:      Cervical: No cervical adenopathy.   Skin:     General: Skin is warm and dry.      Findings: No erythema or rash.   Neurological:      Mental Status: He is alert and oriented to person, place, and time.   Psychiatric:         Behavior: Behavior normal.         Thought Content: Thought content normal.         Judgment: Judgment normal.             Assessment:       1. Prostate cancer    2. CORY (stress urinary incontinence), male    3. Erectile dysfunction after radical prostatectomy    4. ED (erectile dysfunction) of organic origin          Plan:       1. Prostate cancer    - Prostate Specific Antigen, Diagnostic; Future  - Prostate Specific Antigen, Diagnostic; Future  - POCT urinalysis, dipstick or tablet reag    2. CORY (stress urinary incontinence), male  Kegel exercises  Consider a Sling    3. Erectile dysfunction after radical prostatectomy  Cialis     4. ED (erectile dysfunction) of organic origin  As above             No follow-ups on file.

## 2022-05-04 ENCOUNTER — LAB VISIT (OUTPATIENT)
Dept: LAB | Facility: HOSPITAL | Age: 57
End: 2022-05-04
Attending: UROLOGY
Payer: COMMERCIAL

## 2022-05-04 DIAGNOSIS — C61 PROSTATE CANCER: ICD-10-CM

## 2022-05-04 LAB — COMPLEXED PSA SERPL-MCNC: 0.05 NG/ML (ref 0–4)

## 2022-05-04 PROCEDURE — 36415 COLL VENOUS BLD VENIPUNCTURE: CPT | Mod: PO | Performed by: UROLOGY

## 2022-05-04 PROCEDURE — 84153 ASSAY OF PSA TOTAL: CPT | Performed by: UROLOGY

## 2022-05-17 ENCOUNTER — OFFICE VISIT (OUTPATIENT)
Dept: UROLOGY | Facility: CLINIC | Age: 57
End: 2022-05-17
Payer: COMMERCIAL

## 2022-05-17 VITALS — BODY MASS INDEX: 31.94 KG/M2 | WEIGHT: 248.88 LBS

## 2022-05-17 DIAGNOSIS — N39.3 SUI (STRESS URINARY INCONTINENCE), MALE: ICD-10-CM

## 2022-05-17 DIAGNOSIS — N52.31 ERECTILE DYSFUNCTION AFTER RADICAL PROSTATECTOMY: ICD-10-CM

## 2022-05-17 DIAGNOSIS — N48.6 PEYRONIE'S DISEASE: ICD-10-CM

## 2022-05-17 DIAGNOSIS — C61 PROSTATE CANCER: Primary | ICD-10-CM

## 2022-05-17 PROCEDURE — 81001 URINALYSIS AUTO W/SCOPE: CPT | Mod: S$GLB,,, | Performed by: UROLOGY

## 2022-05-17 PROCEDURE — 99999 PR PBB SHADOW E&M-EST. PATIENT-LVL III: CPT | Mod: PBBFAC,,, | Performed by: UROLOGY

## 2022-05-17 PROCEDURE — 99214 PR OFFICE/OUTPT VISIT, EST, LEVL IV, 30-39 MIN: ICD-10-PCS | Mod: S$GLB,,, | Performed by: UROLOGY

## 2022-05-17 PROCEDURE — 81001 PR  URINALYSIS, AUTO, W/SCOPE: ICD-10-PCS | Mod: S$GLB,,, | Performed by: UROLOGY

## 2022-05-17 PROCEDURE — 99214 OFFICE O/P EST MOD 30 MIN: CPT | Mod: S$GLB,,, | Performed by: UROLOGY

## 2022-05-17 PROCEDURE — 99999 PR PBB SHADOW E&M-EST. PATIENT-LVL III: ICD-10-PCS | Mod: PBBFAC,,, | Performed by: UROLOGY

## 2022-05-17 NOTE — PROGRESS NOTES
Subjective:       Patient ID: Chapo Ohara is a 57 y.o. male The patient's last visit with me was on 5/2/2022.     Chief Complaint:   Chief Complaint   Patient presents with    Follow-up       Prostate Cancer  Patient here for post-op follow-up. Patient is status post robotic assisted laparoscopic prostatectomy on 1/20/2021.   The patient reports no problems with eating, bowel movements, voiding, or their wound. The patient is not having any pain.      He is wearing about 3 pads per day.    He has noted a bulge in his groin on the left side.  He has noted pain in his perineum when he sits a long time.    3/23/2021.  He is back with a CT of the pelvis.    6/22/2021  He is wearing a safety pad.  His leakage occurs mostly with the act of standing up.    9/21/2021  He is wearing one pad per day for safety.  He has not had the best effects with Cialis.    5/2/2022  He continues to have a left sided groin bulge.  It is most noticeable at the end of the day of long activity.   He has noted urine during an orgasm.  He is wearing one small safety pad per day.    05/17/2022  He has noted a leftward curvature to his penis.  He denies pain.  It is straight enough for intercourse but it worsening over the past 2 months.  He is back with a new PSA.      ACTIVE MEDICAL ISSUES:  Patient Active Problem List   Diagnosis    Sleep apnea    Hyperlipidemia    HTN (hypertension)    Benign prostatic hyperplasia with urinary frequency    Cardiac murmur    History of colonic polyps    Other insomnia    Prostate cancer    Vitamin D deficiency disease    WANDA (obstructive sleep apnea)       ALLERGIES AND MEDICATIONS: updated and reviewed.  Review of patient's allergies indicates:  No Known Allergies  Current Outpatient Medications   Medication Sig    traZODone (DESYREL) 50 MG tablet Take 1 tablet (50 mg total) by mouth nightly as needed for Insomnia.    amLODIPine (NORVASC) 5 MG tablet Take 1 tablet (5 mg total) by mouth once  daily.    oxyCODONE-acetaminophen (PERCOCET) 5-325 mg per tablet Take 1 tablet by mouth every 4 (four) hours as needed for Pain.    polyethylene glycol (GLYCOLAX) 17 gram PwPk Take 17 g by mouth once daily.    tadalafiL (CIALIS) 20 MG Tab Take 1 tablet (20 mg total) by mouth daily as needed (erectile dysfunction).    valsartan (DIOVAN) 320 MG tablet Take 1 tablet (320 mg total) by mouth once daily.     No current facility-administered medications for this visit.       Review of Systems   Constitutional: Negative for activity change, fatigue, fever and unexpected weight change.   HENT: Negative for congestion.    Eyes: Negative for redness.   Respiratory: Negative for chest tightness and shortness of breath.    Cardiovascular: Negative for chest pain and leg swelling.   Gastrointestinal: Negative for abdominal pain, constipation, diarrhea, nausea and vomiting.   Genitourinary: Negative for dysuria, flank pain, frequency, hematuria, penile pain, penile swelling, scrotal swelling, testicular pain and urgency.   Musculoskeletal: Negative for arthralgias and back pain.   Neurological: Negative for dizziness and light-headedness.   Psychiatric/Behavioral: Negative for behavioral problems and confusion. The patient is not nervous/anxious.    All other systems reviewed and are negative.      Objective:      Vitals:    05/17/22 1311   Weight: 112.9 kg (248 lb 14.4 oz)     Physical Exam  Vitals and nursing note reviewed.   Constitutional:       Appearance: He is well-developed.   HENT:      Head: Normocephalic.   Eyes:      Conjunctiva/sclera: Conjunctivae normal.   Neck:      Thyroid: No thyromegaly.      Trachea: No tracheal deviation.   Cardiovascular:      Rate and Rhythm: Normal rate.      Heart sounds: Normal heart sounds.   Pulmonary:      Effort: Pulmonary effort is normal. No respiratory distress.      Breath sounds: Normal breath sounds. No wheezing.   Abdominal:      General: Bowel sounds are normal.       Palpations: Abdomen is soft.      Tenderness: There is no abdominal tenderness. There is no rebound.      Hernia: No hernia is present. There is no hernia in the right inguinal area or left inguinal area.   Genitourinary:     Penis: Uncircumcised.       Testes: Normal.       Musculoskeletal:         General: No tenderness. Normal range of motion.      Cervical back: Normal range of motion and neck supple.   Lymphadenopathy:      Cervical: No cervical adenopathy.   Skin:     General: Skin is warm and dry.      Findings: No erythema or rash.   Neurological:      Mental Status: He is alert and oriented to person, place, and time.   Psychiatric:         Behavior: Behavior normal.         Thought Content: Thought content normal.         Judgment: Judgment normal.         Component Ref Range & Units 13 d ago 5 mo ago 8 mo ago 11 mo ago 1 yr ago   PSA Diagnostic 0.00 - 4.00 ng/mL 0.05  <0.01 CM  <0.01 CM  <0.01 CM  0.01 CM    Comment: PSA Expected levels:   Hormonal Therapy: <0.05 ng/ml   Prostatectomy: <0.01 ng/ml   Radiation Therapy: <1.00 ng/ml    Resulting Agency  [facility]             Narrative  Performed by: [facility]  PSA 3 months      Specimen Collected: 05/04/22 12:01 Last Resulted: 05/04/22 17:04                 Assessment:       1. Prostate cancer    2. CORY (stress urinary incontinence), male    3. Erectile dysfunction after radical prostatectomy    4. Peyronie's disease          Plan:       1. Prostate cancer  Monitor, he may need XRT  - Prostate Specific Antigen, Diagnostic; Future    2. CORY (stress urinary incontinence), male  Kegel exercises    3. Erectile dysfunction after radical prostatectomy  Cialis    4. Peyronie's disease  I will have him see Dr. James to consider Xiaflex             No follow-ups on file.

## 2022-05-25 ENCOUNTER — OFFICE VISIT (OUTPATIENT)
Dept: UROLOGY | Facility: CLINIC | Age: 57
End: 2022-05-25
Payer: COMMERCIAL

## 2022-05-25 VITALS — WEIGHT: 248.25 LBS | BODY MASS INDEX: 31.86 KG/M2

## 2022-05-25 DIAGNOSIS — N48.6 PEYRONIE'S DISEASE: Primary | ICD-10-CM

## 2022-05-25 PROCEDURE — 99999 PR PBB SHADOW E&M-EST. PATIENT-LVL III: ICD-10-PCS | Mod: PBBFAC,,, | Performed by: STUDENT IN AN ORGANIZED HEALTH CARE EDUCATION/TRAINING PROGRAM

## 2022-05-25 PROCEDURE — 99213 PR OFFICE/OUTPT VISIT, EST, LEVL III, 20-29 MIN: ICD-10-PCS | Mod: S$GLB,,, | Performed by: STUDENT IN AN ORGANIZED HEALTH CARE EDUCATION/TRAINING PROGRAM

## 2022-05-25 PROCEDURE — 99213 OFFICE O/P EST LOW 20 MIN: CPT | Mod: S$GLB,,, | Performed by: STUDENT IN AN ORGANIZED HEALTH CARE EDUCATION/TRAINING PROGRAM

## 2022-05-25 PROCEDURE — 99999 PR PBB SHADOW E&M-EST. PATIENT-LVL III: CPT | Mod: PBBFAC,,, | Performed by: STUDENT IN AN ORGANIZED HEALTH CARE EDUCATION/TRAINING PROGRAM

## 2022-05-25 NOTE — PATIENT INSTRUCTIONS
Bring medication to clinic on ice    Call us when medication is on its way to you    Penile Ultrasound and Doppler  Overview    Penile ultrasound and duplex doppler allows doctors to see how well you can obtain an erection.  It also allows for us to see if there is any scar tissue in your penis which may be the cause of penile curvature.      Description    A medicine is given to cause an erection.  If the erection lasts beyond the completion of the test, you may require injection a small amount of medicine to reverse the erection. We will monitor you for at least 30 minutes after your procedure.     Pre Procedure:   Do not take erectile medications the day prior or the day of your procedure, as this may lead to a prolonged and painful erection.    Do NOT engage in sexual intercourse or masturbation the day you have the test.    Post Procedure care    Possible side effects or complications:   Painful injection - this is a part of the test to see whether the nerve inside the penis is normal. A patient with nerve problems may feel severe aching pain for several hours.   Small area of bruising (black and blue) or bleeding at the injection site. This happens more often if aspirin or blood thinner (coumadin or heparin) is used around the time of the procedure.   Feeling lightheaded after injection, which normally disappears within a few minutes. This occasionally happens in patients with nerve problems or too much overflow from the penis.    Prolonged (rigid) erection lasting more than 4 hours- this is a medical emergency, patients should call their doctor to report this condition while heading to the emergency room- untreated this will lead to erectile dysfunction

## 2022-05-25 NOTE — PROGRESS NOTES
Patient ID: Chapo Ohara is a 57 y.o. male.    Chief Complaint: Other (peyronies)    Referral: No referring provider defined for this encounter.     HPI  57 y.o. who presents to the Urology clinic for evaluation of curvature of penis which developed after his prostatectomy about 1 year ago. Patient notes he has erections sufficient for penetration but notes significant curvature. He has no photos available for review. He occasionally notes painful erections overnight while sleeping, but not during attempts for intercourse.   He denies trauma to his penis. Denies smoking. He is interested in discussing options for management of penile curvature.     Medically Necessary ROS documented in HPI    Past Medical History  Active Ambulatory Problems     Diagnosis Date Noted    Sleep apnea 09/10/2012    Hyperlipidemia 03/07/2014    HTN (hypertension) 03/07/2014    Benign prostatic hyperplasia with urinary frequency 11/21/2014    Cardiac murmur 11/21/2014    History of colonic polyps 04/11/2017    Other insomnia 06/04/2020    Prostate cancer 11/11/2020    Vitamin D deficiency disease 08/25/2021    WANDA (obstructive sleep apnea) 08/25/2021     Resolved Ambulatory Problems     Diagnosis Date Noted    Routine medical exam 03/07/2014    Screening for colon cancer 12/11/2015     No Additional Past Medical History         Past Surgical History  Past Surgical History:   Procedure Laterality Date    COLONOSCOPY N/A 12/11/2015    Procedure: COLONOSCOPY;  Surgeon: Balta Farah MD;  Location: [facility] ENDO;  Service: Endoscopy;  Laterality: N/A;    COLONOSCOPY N/A 3/10/2022    Procedure: COLONOSCOPY;  Surgeon: Natalia Mcqueen MD;  Location: [facility];  Service: Endoscopy;  Laterality: N/A;  1/26 covid test 3/7 @ [facility]; instructions to portal-st    PROSTATE BIOPSY      ROBOT-ASSISTED LAPAROSCOPIC PELVIC LYMPHADENECTOMY USING DA ERNIE XI Bilateral 1/20/2021    Procedure: XI ROBOTIC LYMPHADENECTOMY, PELVIC;  Surgeon: BOOGIE  Sonny Hammond MD;  Location: [facility] OR;  Service: Urology;  Laterality: Bilateral;    ROBOT-ASSISTED LAPAROSCOPIC PROSTATECTOMY USING DA ERNIE XI N/A 1/20/2021    Procedure: XI ROBOTIC PROSTATECTOMY;  Surgeon: BOOGIE Hammond MD;  Location: [facility] OR;  Service: Urology;  Laterality: N/A;    trus/bx 2020      TUMOR REMOVAL      back of head       Social History  Social Connections: Not on file       Medications    Current Outpatient Medications:     traZODone (DESYREL) 50 MG tablet, Take 1 tablet (50 mg total) by mouth nightly as needed for Insomnia., Disp: 90 tablet, Rfl: 12    amLODIPine (NORVASC) 5 MG tablet, Take 1 tablet (5 mg total) by mouth once daily., Disp: 30 tablet, Rfl: 11    oxyCODONE-acetaminophen (PERCOCET) 5-325 mg per tablet, Take 1 tablet by mouth every 4 (four) hours as needed for Pain., Disp: 6 tablet, Rfl: 0    polyethylene glycol (GLYCOLAX) 17 gram PwPk, Take 17 g by mouth once daily., Disp: 10 packet, Rfl: 0    tadalafiL (CIALIS) 20 MG Tab, Take 1 tablet (20 mg total) by mouth daily as needed (erectile dysfunction)., Disp: 10 tablet, Rfl: 11    valsartan (DIOVAN) 320 MG tablet, Take 1 tablet (320 mg total) by mouth once daily., Disp: 90 tablet, Rfl: 3    Allergies  Review of patient's allergies indicates:  No Known Allergies    Patient's PMH, FH, Social hx, Medications, allergies reviewed and updated as pertinent to today's visit    Objective:      Physical Exam  Constitutional:       General: He is not in acute distress.     Appearance: He is well-developed. He is not ill-appearing, toxic-appearing or diaphoretic.   HENT:      Head: Normocephalic and atraumatic.      Mouth/Throat:      Mouth: Mucous membranes are moist.   Eyes:      Conjunctiva/sclera: Conjunctivae normal.   Pulmonary:      Effort: Pulmonary effort is normal. No respiratory distress.   Abdominal:      General: Abdomen is flat. There is no distension.   Genitourinary:     Comments: Exam deferred until time of penile  ultrasound  Musculoskeletal:         General: No swelling or deformity.      Cervical back: Neck supple.   Skin:     Capillary Refill: Capillary refill takes less than 2 seconds.      Findings: No rash.   Neurological:      Mental Status: He is alert and oriented to person, place, and time.      Gait: Gait normal.   Psychiatric:         Mood and Affect: Mood normal.         Thought Content: Thought content normal.         Judgment: Judgment normal.             Lab Results   Component Value Date    PSADIAG 0.05 05/04/2022    PSADIAG <0.01 12/14/2021    PSADIAG <0.01 09/09/2021      Assessment:       1. Peyronie's disease        Plan:           Discussed pathophysiology and what is known about penile curvature- peyronies disease.   Penile US scheduled with intracavernosal injection  Discussed risk for prolonged erection, discussed need for phenylephrine to reverse erection  Discussed US will be used to assess for penile plaques, assess degree of curvature  Trimix prescribed patient to bring to his appointment on ice

## 2022-07-05 ENCOUNTER — TELEPHONE (OUTPATIENT)
Dept: UROLOGY | Facility: CLINIC | Age: 57
End: 2022-07-05
Payer: COMMERCIAL

## 2022-07-05 NOTE — TELEPHONE ENCOUNTER
Pt notified of appt for u/s 7/13/22 @ 11:00am and pt instructed to bring medication on ice.  Pt verbalized understanding.    ----- Message from Dale James MD sent at 7/5/2022  9:03 AM CDT -----  Regarding: RE: appt  He needs a 45 min procedure time slot typically 11am - any day  No PM appointments  ----- Message -----  From: Denisse Barraza RN  Sent: 7/5/2022   9:00 AM CDT  To: Dale James MD  Subject: appt                                             Pt sent a message he received his medication and would like to schedule appt.  Is there a certain day or time you would like to have him come in?

## 2022-07-13 ENCOUNTER — PROCEDURE VISIT (OUTPATIENT)
Dept: UROLOGY | Facility: CLINIC | Age: 57
End: 2022-07-13
Payer: COMMERCIAL

## 2022-07-13 DIAGNOSIS — N48.6 PEYRONIE'S DISEASE: Primary | ICD-10-CM

## 2022-07-13 PROCEDURE — 54220 IRRG CRPRA CAVRNOSA PRIAPISM: CPT | Mod: S$GLB,,, | Performed by: STUDENT IN AN ORGANIZED HEALTH CARE EDUCATION/TRAINING PROGRAM

## 2022-07-13 PROCEDURE — 93980 PR PENILE VASCULAR STUDY,COMPLETE: ICD-10-PCS | Mod: S$GLB,,, | Performed by: STUDENT IN AN ORGANIZED HEALTH CARE EDUCATION/TRAINING PROGRAM

## 2022-07-13 PROCEDURE — 54235 PR INJECT CORPORA CAVERN,PHARM AGNT: ICD-10-PCS | Mod: 51,S$GLB,, | Performed by: STUDENT IN AN ORGANIZED HEALTH CARE EDUCATION/TRAINING PROGRAM

## 2022-07-13 PROCEDURE — 54220 PR IRRIGAT CORPUS CAVERN,PRIAPISM: ICD-10-PCS | Mod: S$GLB,,, | Performed by: STUDENT IN AN ORGANIZED HEALTH CARE EDUCATION/TRAINING PROGRAM

## 2022-07-13 PROCEDURE — 93980 PENILE VASCULAR STUDY: CPT | Mod: S$GLB,,, | Performed by: STUDENT IN AN ORGANIZED HEALTH CARE EDUCATION/TRAINING PROGRAM

## 2022-07-13 PROCEDURE — 54235 NJX CORPORA CAVERNOSA RX AGT: CPT | Mod: 51,S$GLB,, | Performed by: STUDENT IN AN ORGANIZED HEALTH CARE EDUCATION/TRAINING PROGRAM

## 2022-07-13 RX ORDER — PHENYLEPHRINE HYDROCHLORIDE 10 MG/ML
800 INJECTION INTRAVENOUS ONCE
Status: SHIPPED | OUTPATIENT
Start: 2022-07-13

## 2022-07-13 NOTE — PROCEDURES
Procedures      Date of procedure 7/13/2022   Pre op diagnosis: Peyronies disease. Penile curvature; erectile dysfunction  Post op diagnosis: as above  Surgeon: Dale James MD  Assistant: Shaq Ronquillo MA  Procedure: Intracavernosal injection, penile ultrasound, penile irrigation  Indications: penile curvature, erectile dysfunction     Procedure details: After informed consent was obtained the patient was placed in the supine position. His blood pressure was obtained prior to the procedure, 142/92. Patient's penile length from the pubic bone and circumference were measured in the flaccid state; 15cm and 10 cm respectively. The cavernosal bodies were evaluated in the longitudinal and axial planes dorsally and ventrally.  Patient's penile skin was cleared of ultrasound jelly, cleaned with alcohol swab,  40 units of Tri Mix was injected into the 3 o'clock position with compression held for 1 minute, total injection time less than 4 seconds.  At 10  minutes from time of injection patient reports erection is not at his baseline, so we re-dosed him an additional 40 units the erection present is typical for him to try to achieve penetration. At 15 minutes no change in erection caliber. Measurements remained the same. Clarification from the patient revealed he was trying to compare his erections to his pre prostatectomy state.  The penile measurements were obtained again, revealing a penile length the same as above. There was a 3cm left lateral plaque and diffuse calcifications the mid shaft corporal body. The cavernosal arteries were visualized, MAX PSV  8cm/s. Maximal curvature of penile about 40 degrees laterally and 15 degrees dorsally. There was absence of rigidity of phallus and glans distal to the most distal aspect of the plaque.      Post procedure /86    Diluted Phenylephrine in injectable 0.9 % saline  was used to reverse patient 800mcg used  Penile irrigation was required to provide sufficient  detumescense       Patient counseled when to return to emergency room for prolonged erection.     Assessment: not a candidate for xiaflex, recommend IPP to address ED and Peyronie's disease, patient sent back to established Urologist, Dr. Hammond

## 2022-07-29 ENCOUNTER — OFFICE VISIT (OUTPATIENT)
Dept: FAMILY MEDICINE | Facility: CLINIC | Age: 57
End: 2022-07-29
Payer: COMMERCIAL

## 2022-07-29 VITALS
OXYGEN SATURATION: 97 % | HEIGHT: 74 IN | BODY MASS INDEX: 31.25 KG/M2 | DIASTOLIC BLOOD PRESSURE: 88 MMHG | SYSTOLIC BLOOD PRESSURE: 126 MMHG | WEIGHT: 243.5 LBS | TEMPERATURE: 98 F | HEART RATE: 68 BPM

## 2022-07-29 DIAGNOSIS — M54.9 ACUTE RIGHT-SIDED BACK PAIN, UNSPECIFIED BACK LOCATION: Primary | ICD-10-CM

## 2022-07-29 PROCEDURE — 99999 PR PBB SHADOW E&M-EST. PATIENT-LVL III: ICD-10-PCS | Mod: PBBFAC,,, | Performed by: STUDENT IN AN ORGANIZED HEALTH CARE EDUCATION/TRAINING PROGRAM

## 2022-07-29 PROCEDURE — 96372 PR INJECTION,THERAP/PROPH/DIAG2ST, IM OR SUBCUT: ICD-10-PCS | Mod: S$GLB,,, | Performed by: STUDENT IN AN ORGANIZED HEALTH CARE EDUCATION/TRAINING PROGRAM

## 2022-07-29 PROCEDURE — 96372 THER/PROPH/DIAG INJ SC/IM: CPT | Mod: S$GLB,,, | Performed by: STUDENT IN AN ORGANIZED HEALTH CARE EDUCATION/TRAINING PROGRAM

## 2022-07-29 PROCEDURE — 99999 PR PBB SHADOW E&M-EST. PATIENT-LVL III: CPT | Mod: PBBFAC,,, | Performed by: STUDENT IN AN ORGANIZED HEALTH CARE EDUCATION/TRAINING PROGRAM

## 2022-07-29 PROCEDURE — 99213 PR OFFICE/OUTPT VISIT, EST, LEVL III, 20-29 MIN: ICD-10-PCS | Mod: 25,S$GLB,, | Performed by: STUDENT IN AN ORGANIZED HEALTH CARE EDUCATION/TRAINING PROGRAM

## 2022-07-29 PROCEDURE — 99213 OFFICE O/P EST LOW 20 MIN: CPT | Mod: 25,S$GLB,, | Performed by: STUDENT IN AN ORGANIZED HEALTH CARE EDUCATION/TRAINING PROGRAM

## 2022-07-29 RX ORDER — KETOCONAZOLE 2 G/100G
AEROSOL, FOAM TOPICAL
COMMUNITY
Start: 2015-11-11 | End: 2022-11-14

## 2022-07-29 RX ORDER — NAPROXEN 500 MG/1
500 TABLET ORAL 2 TIMES DAILY
Qty: 60 TABLET | Refills: 1 | Status: SHIPPED | OUTPATIENT
Start: 2022-07-29 | End: 2022-08-17

## 2022-07-29 RX ORDER — KETOROLAC TROMETHAMINE 30 MG/ML
30 INJECTION, SOLUTION INTRAMUSCULAR; INTRAVENOUS
Status: COMPLETED | OUTPATIENT
Start: 2022-07-29 | End: 2022-07-29

## 2022-07-29 RX ORDER — CYCLOBENZAPRINE HCL 10 MG
10 TABLET ORAL 3 TIMES DAILY PRN
Qty: 30 TABLET | Refills: 1 | Status: SHIPPED | OUTPATIENT
Start: 2022-07-29 | End: 2022-08-08

## 2022-07-29 RX ADMIN — KETOROLAC TROMETHAMINE 30 MG: 30 INJECTION, SOLUTION INTRAMUSCULAR; INTRAVENOUS at 08:07

## 2022-07-29 NOTE — PROGRESS NOTES
07/29/2022    Chapo Ohara  [id number]    CC: right sided pain    HPI    Started acutely yesterday. Sharp pain under ribs that hurts most with any movement. No n/v/d. No recent falls or heavy lifting. No hx of kidney stones. No constipation. No hx of gallstones. NO decreased intake.       Negative 10 point ROS outside of HPI    Social History     Socioeconomic History    Marital status:    Occupational History     Employer: [employer]   Tobacco Use    Smoking status: Never Smoker    Smokeless tobacco: Never Used   Substance and Sexual Activity    Alcohol use: No    Drug use: No    Sexual activity: Yes     Partners: Female           Current Outpatient Medications:     ketoconazole (NIZORAL) 2 % shampoo, Apply topically twice a week., Disp: 500 mL, Rfl: 12    ketoconazole 2 % Foam, , Disp: , Rfl:     tadalafiL (CIALIS) 20 MG Tab, Take 1 tablet (20 mg total) by mouth daily as needed (erectile dysfunction)., Disp: 10 tablet, Rfl: 11    traZODone (DESYREL) 50 MG tablet, Take 1 tablet (50 mg total) by mouth nightly as needed for Insomnia., Disp: 90 tablet, Rfl: 12    pep injection, Bring to appt on ice For compounding pharmacy use:  Add PAPAVERINE 30 mcg Add PHENTOLAMINE 10 mg Add ALPROSTADIL 100 mcg (Patient not taking: Reported on 7/29/2022), Disp: 1 vial, Rfl: 0    Current Facility-Administered Medications:     alprostadiL (PROSTIN VR) 50 mcg, phentolamine (REGITINE) 1 mg, papaverine 0.03 mg / 5 mL, 0.1 mL, Intracavernosal, 1 time in Clinic/HOD, Dale James MD    PHENYLephrine injection 800 mcg, 800 mcg, Intracavernosal, Once, Dale James MD      Physical Exam  Vitals:    07/29/22 0747   BP: 126/88   Pulse: 68   Temp: 98 °F (36.7 °C)       Gen: appears in pain  Resp: non labored breathing, no crackles, no wheezes, CTAB  CV: RRR no murmur, gallops, rubs, no LE edema  Abd: soft nontender BS present no organomegaly  Back: pain w/ flexion, extension and rotation. No pain with touch     1.  Acute right-sided back pain, unspecified back location  Recommended heat and ice  - naproxen (NAPROSYN) 500 MG tablet; Take 1 tablet (500 mg total) by mouth 2 (two) times daily.  Dispense: 60 tablet; Refill: 1  - cyclobenzaprine (FLEXERIL) 10 MG tablet; Take 1 tablet (10 mg total) by mouth 3 (three) times daily as needed for Muscle spasms.  Dispense: 30 tablet; Refill: 1  - ketorolac injection 30 mg    RTC if worsening or no improvement in 1-2 weeks    Nury Schaefer MD  Family Medicine

## 2022-08-02 ENCOUNTER — LAB VISIT (OUTPATIENT)
Dept: LAB | Facility: HOSPITAL | Age: 57
End: 2022-08-02
Attending: UROLOGY
Payer: COMMERCIAL

## 2022-08-02 DIAGNOSIS — C61 PROSTATE CANCER: ICD-10-CM

## 2022-08-02 LAB — COMPLEXED PSA SERPL-MCNC: 0.05 NG/ML (ref 0–4)

## 2022-08-02 PROCEDURE — 36415 COLL VENOUS BLD VENIPUNCTURE: CPT | Mod: PO | Performed by: UROLOGY

## 2022-08-02 PROCEDURE — 84153 ASSAY OF PSA TOTAL: CPT | Performed by: UROLOGY

## 2022-08-09 ENCOUNTER — OFFICE VISIT (OUTPATIENT)
Dept: UROLOGY | Facility: CLINIC | Age: 57
End: 2022-08-09
Payer: COMMERCIAL

## 2022-08-09 VITALS — BODY MASS INDEX: 31.03 KG/M2 | HEIGHT: 74 IN | WEIGHT: 241.75 LBS

## 2022-08-09 DIAGNOSIS — N52.31 ERECTILE DYSFUNCTION AFTER RADICAL PROSTATECTOMY: ICD-10-CM

## 2022-08-09 DIAGNOSIS — N48.6 PEYRONIE'S DISEASE: ICD-10-CM

## 2022-08-09 DIAGNOSIS — C61 PROSTATE CANCER: Primary | ICD-10-CM

## 2022-08-09 DIAGNOSIS — N39.3 SUI (STRESS URINARY INCONTINENCE), MALE: ICD-10-CM

## 2022-08-09 PROCEDURE — 99999 PR PBB SHADOW E&M-EST. PATIENT-LVL IV: CPT | Mod: PBBFAC,,, | Performed by: UROLOGY

## 2022-08-09 PROCEDURE — 81001 POCT URINALYSIS, DIPSTICK OR TABLET REAGENT, AUTOMATED, WITH MICROSCOP: ICD-10-PCS | Mod: S$GLB,,, | Performed by: UROLOGY

## 2022-08-09 PROCEDURE — 81001 URINALYSIS AUTO W/SCOPE: CPT | Mod: S$GLB,,, | Performed by: UROLOGY

## 2022-08-09 PROCEDURE — 99214 OFFICE O/P EST MOD 30 MIN: CPT | Mod: S$GLB,,, | Performed by: UROLOGY

## 2022-08-09 PROCEDURE — 99214 PR OFFICE/OUTPT VISIT, EST, LEVL IV, 30-39 MIN: ICD-10-PCS | Mod: S$GLB,,, | Performed by: UROLOGY

## 2022-08-09 PROCEDURE — 99999 PR PBB SHADOW E&M-EST. PATIENT-LVL IV: ICD-10-PCS | Mod: PBBFAC,,, | Performed by: UROLOGY

## 2022-08-09 NOTE — PROGRESS NOTES
Subjective:       Patient ID: Chapo Ohara is a 57 y.o. male The patient's last visit with me was on 5/17/2022.     Chief Complaint:   Chief Complaint   Patient presents with    Follow-up       Prostate Cancer  Patient here for post-op follow-up. Patient is status post robotic assisted laparoscopic prostatectomy on 1/20/2021.   The patient reports no problems with eating, bowel movements, voiding, or their wound. The patient is not having any pain.      He is wearing about 3 pads per day.    He has noted a bulge in his groin on the left side.  He has noted pain in his perineum when he sits a long time.    3/23/2021.  He is back with a CT of the pelvis.    6/22/2021  He is wearing a safety pad.  His leakage occurs mostly with the act of standing up.    9/21/2021  He is wearing one pad per day for safety.  He has not had the best effects with Cialis.    5/2/2022  He continues to have a left sided groin bulge.  It is most noticeable at the end of the day of long activity.   He has noted urine during an orgasm.  He is wearing one small safety pad per day.    5/17/2022  He has noted a leftward curvature to his penis.  He denies pain.  It is straight enough for intercourse but it worsening over the past 2 months.  He is back with a new PSA.    08/09/2022  He met with Dr. James. He is not a candidate for Xiaflex.    He feels his curvature is worsening. He wants to proceed with an IPP.    He is wearing one pad per day for CORY.    ACTIVE MEDICAL ISSUES:  Patient Active Problem List   Diagnosis    Sleep apnea    Hyperlipidemia    HTN (hypertension)    Benign prostatic hyperplasia with urinary frequency    Cardiac murmur    History of colonic polyps    Other insomnia    Prostate cancer    Vitamin D deficiency disease    WANDA (obstructive sleep apnea)       ALLERGIES AND MEDICATIONS: updated and reviewed.  Review of patient's allergies indicates:  No Known Allergies  Current Outpatient Medications   Medication Sig  "   ketoconazole (NIZORAL) 2 % shampoo Apply topically twice a week.    ketoconazole 2 % Foam     naproxen (NAPROSYN) 500 MG tablet Take 1 tablet (500 mg total) by mouth 2 (two) times daily.    traZODone (DESYREL) 50 MG tablet Take 1 tablet (50 mg total) by mouth nightly as needed for Insomnia.    pep injection Bring to appt on ice  For compounding pharmacy use:   Add PAPAVERINE 30 mcg  Add PHENTOLAMINE 10 mg  Add ALPROSTADIL 100 mcg (Patient not taking: No sig reported)    tadalafiL (CIALIS) 20 MG Tab Take 1 tablet (20 mg total) by mouth daily as needed (erectile dysfunction).     Current Facility-Administered Medications   Medication    alprostadiL (PROSTIN VR) 50 mcg, phentolamine (REGITINE) 1 mg, papaverine 0.03 mg / 5 mL    PHENYLephrine injection 800 mcg       Review of Systems   Constitutional: Negative for activity change, fatigue, fever and unexpected weight change.   HENT: Negative for congestion.    Eyes: Negative for redness.   Respiratory: Negative for chest tightness and shortness of breath.    Cardiovascular: Negative for chest pain and leg swelling.   Gastrointestinal: Negative for abdominal pain, constipation, diarrhea, nausea and vomiting.   Genitourinary: Negative for dysuria, flank pain, frequency, hematuria, penile pain, penile swelling, scrotal swelling, testicular pain and urgency.   Musculoskeletal: Negative for arthralgias and back pain.   Neurological: Negative for dizziness and light-headedness.   Psychiatric/Behavioral: Negative for behavioral problems and confusion. The patient is not nervous/anxious.    All other systems reviewed and are negative.      Objective:      Vitals:    08/09/22 1349   Weight: 109.6 kg (241 lb 11.8 oz)   Height: 6' 2.02" (1.88 m)     Physical Exam  Vitals and nursing note reviewed.   Constitutional:       Appearance: He is well-developed.   HENT:      Head: Normocephalic.   Eyes:      Conjunctiva/sclera: Conjunctivae normal.   Neck:      Thyroid: No " thyromegaly.      Trachea: No tracheal deviation.   Cardiovascular:      Rate and Rhythm: Normal rate.      Heart sounds: Normal heart sounds.   Pulmonary:      Effort: Pulmonary effort is normal. No respiratory distress.      Breath sounds: Normal breath sounds. No wheezing.   Abdominal:      General: Bowel sounds are normal.      Palpations: Abdomen is soft.      Tenderness: There is no abdominal tenderness. There is no rebound.      Hernia: No hernia is present. There is no hernia in the right inguinal area or left inguinal area.   Genitourinary:     Penis: Uncircumcised.       Testes: Normal.       Musculoskeletal:         General: No tenderness. Normal range of motion.      Cervical back: Normal range of motion and neck supple.   Lymphadenopathy:      Cervical: No cervical adenopathy.   Skin:     General: Skin is warm and dry.      Findings: No erythema or rash.   Neurological:      Mental Status: He is alert and oriented to person, place, and time.   Psychiatric:         Behavior: Behavior normal.         Thought Content: Thought content normal.         Judgment: Judgment normal.         Component Ref Range & Units 7 d ago   (8/2/22) 3 mo ago   (5/4/22) 7 mo ago   (12/14/21) 11 mo ago   (9/9/21) 1 yr ago   (6/16/21) 1 yr ago   (2/25/21)   PSA Diagnostic 0.00 - 4.00 ng/mL 0.05  0.05 CM  <0.01 CM  <0.01 CM  <0.01 CM  0.01 CM    Comment: The testing method is a chemiluminescent microparticle immunoassay   manufactured by [employer] and performed on the [employer]   or   [employer] system. Values obtained with different assay manufacturers   for   methods may be different and cannot be used interchangeably.   PSA Expected levels:   Hormonal Therapy: <0.05 ng/ml   Prostatectomy: <0.01 ng/ml   Radiation Therapy: <1.00 ng/ml    Resulting Agency  [facility]             Narrative  Performed by: [facility]  3rd floor lab      Specimen Collected: 08/02/22 07:57 Last Resulted: 08/02/22 14:26                  Assessment:       1. Prostate cancer    2. Peyronie's disease    3. Erectile dysfunction after radical prostatectomy    4. CORY (stress urinary incontinence), male          Plan:       1. Peyronie's disease  I recommend an IPP, but I also recommend he wait until the curvature stabilizes.  I recommend a second opinion, [place] [facility] [place]    - Ambulatory referral/consult to Urology; Future    2. Erectile dysfunction after radical prostatectomy  As above  - Ambulatory referral/consult to Urology; Future    3. Prostate cancer  Monitor, consider XRT if it rises  - Prostate Specific Antigen, Diagnostic; Future    4. CORY (stress urinary incontinence), male  Sling when he wants  Cystoscopy   - POCT urinalysis, dipstick or tablet reag             Follow up in about 3 months (around 11/9/2022) for Follow up.

## 2022-08-10 LAB
BILIRUB SERPL-MCNC: NEGATIVE MG/DL
BLOOD URINE, POC: NEGATIVE
COLOR, POC UA: YELLOW
GLUCOSE UR QL STRIP: NORMAL
KETONES UR QL STRIP: NEGATIVE
LEUKOCYTE ESTERASE URINE, POC: NEGATIVE
NITRITE, POC UA: NEGATIVE
PH, POC UA: 5
PROTEIN, POC: NORMAL
SPECIFIC GRAVITY, POC UA: 1030
UROBILINOGEN, POC UA: NORMAL

## 2022-08-15 ENCOUNTER — TELEPHONE (OUTPATIENT)
Dept: UROLOGY | Facility: CLINIC | Age: 57
End: 2022-08-15
Payer: COMMERCIAL

## 2022-08-15 NOTE — TELEPHONE ENCOUNTER
Call placed to patient. Name and date of birth verified. Patient offered assistance with scheduling an appointment with Dr. El in regards to referral. Patient declined and stated he needs to follow-up with another provider prior to scheduling an appointment to see Dr. El. Patient instructed to contact our office for scheduling, contact information provided. Patient verbalized understanding.

## 2022-08-15 NOTE — TELEPHONE ENCOUNTER
----- Message from Denisse Barraza RN sent at 8/15/2022 12:07 PM CDT -----  Regarding: referral  Dr. Hammond placed a referral for pt to see Dr. El

## 2022-08-16 ENCOUNTER — TELEPHONE (OUTPATIENT)
Dept: UROLOGY | Facility: CLINIC | Age: 57
End: 2022-08-16
Payer: COMMERCIAL

## 2022-08-16 NOTE — TELEPHONE ENCOUNTER
----- Message from Mandi Martins sent at 8/16/2022  9:02 AM CDT -----  Contact: pt  Pt requesting call back RE: Schedule appt from referral, nothing available in epic. Please call          Confirmed contact below:  Contact Name:Chapo Ohara  Phone Number: [phone]

## 2022-08-16 NOTE — TELEPHONE ENCOUNTER
Call placed to patient. Name and date of birth verified. Patient requesting to be scheduled through referral. Appointment scheduled and noted in epic. Patient informed appointment details are noted in portal. Patient verbalized understanding.

## 2022-08-17 ENCOUNTER — OFFICE VISIT (OUTPATIENT)
Dept: UROLOGY | Facility: CLINIC | Age: 57
End: 2022-08-17
Payer: COMMERCIAL

## 2022-08-17 VITALS
WEIGHT: 238.13 LBS | DIASTOLIC BLOOD PRESSURE: 105 MMHG | HEIGHT: 74 IN | HEART RATE: 78 BPM | SYSTOLIC BLOOD PRESSURE: 169 MMHG | BODY MASS INDEX: 30.56 KG/M2

## 2022-08-17 DIAGNOSIS — C61 PROSTATE CANCER: ICD-10-CM

## 2022-08-17 DIAGNOSIS — N48.6 PEYRONIE'S DISEASE: Primary | ICD-10-CM

## 2022-08-17 DIAGNOSIS — N39.3 MALE STRESS INCONTINENCE: ICD-10-CM

## 2022-08-17 DIAGNOSIS — N52.31 ERECTILE DYSFUNCTION AFTER RADICAL PROSTATECTOMY: ICD-10-CM

## 2022-08-17 PROBLEM — Z86.0100 HISTORY OF COLONIC POLYPS: Status: RESOLVED | Noted: 2017-04-11 | Resolved: 2022-08-17

## 2022-08-17 PROBLEM — Z86.010 HISTORY OF COLONIC POLYPS: Status: RESOLVED | Noted: 2017-04-11 | Resolved: 2022-08-17

## 2022-08-17 PROCEDURE — 99245 OFF/OP CONSLTJ NEW/EST HI 55: CPT | Mod: S$GLB,,, | Performed by: UROLOGY

## 2022-08-17 PROCEDURE — 99999 PR PBB SHADOW E&M-EST. PATIENT-LVL IV: CPT | Mod: PBBFAC,,, | Performed by: UROLOGY

## 2022-08-17 PROCEDURE — 99999 PR PBB SHADOW E&M-EST. PATIENT-LVL IV: ICD-10-PCS | Mod: PBBFAC,,, | Performed by: UROLOGY

## 2022-08-17 PROCEDURE — 99245 PR OFFICE CONSULTATION,LEVEL V: ICD-10-PCS | Mod: S$GLB,,, | Performed by: UROLOGY

## 2022-08-17 NOTE — PROGRESS NOTES
Mr. Ohara is a 57 y.o. male presenting for a consultation at the request of Dr. Hammond. Patient presents with ED.    PRESENTING ILLNESS:    Chapo Ohara is a 57 y.o. male with prostate cancer s/p RALP on 1/20/21by Dr. Hammond.  Since then, he c/o severe ED.  He's tried and failed oral meds.    He also c/o peyronie's disease.  He has ~ 40 degrees of curve to the L.  Has been present for 6 months.  It's difficult to penetrate due to the curve.    He does have CORY.  He is wearing 1 pads/day.  No hematuria.  No dysuria.  Good FOS.    No bone pain or weight loss.    REVIEW OF SYSTEMS:    Chapo Ohaar denies headache, blurred vision, fever, nausea, vomiting, chills, abdominal pain, chest pain, sore throat, bleeding per rectum, cough, SOB, recent loss of consciousness, recent mental status changes, seizures, dizziness, or upper or lower extremity weakness.    JUAN ANTONIO  1. 2  2. 2  3. 2  4. 1  5. 0      PATIENT HISTORY:    Past Medical History:   Diagnosis Date    History of colonic polyps 4/11/2017    HTN (hypertension) 3/7/2014    Hyperlipidemia 03/07/2014    patient states he does not take medication    Prostate cancer     Sleep apnea 9/10/2012    patient does not have sleep apnea       Family History   Problem Relation Age of Onset    No Known Problems Mother     Hypertension Father        Past Surgical History:   Procedure Laterality Date    COLONOSCOPY N/A 12/11/2015    Procedure: COLONOSCOPY;  Surgeon: Balta Farah MD;  Location: [facility] ENDO;  Service: Endoscopy;  Laterality: N/A;    COLONOSCOPY N/A 3/10/2022    Procedure: COLONOSCOPY;  Surgeon: Natalia Mcqueen MD;  Location: [facility];  Service: Endoscopy;  Laterality: N/A;  1/26 covid test 3/7 @ [facility]; instructions to portal-[facility]    PROSTATE BIOPSY      ROBOT-ASSISTED LAPAROSCOPIC PELVIC LYMPHADENECTOMY USING DA ERNIE XI Bilateral 1/20/2021    Procedure: XI ROBOTIC LYMPHADENECTOMY, PELVIC;  Surgeon: BOOGIE Hammond MD;  Location: [facility] OR;  Service:  Urology;  Laterality: Bilateral;    ROBOT-ASSISTED LAPAROSCOPIC PROSTATECTOMY USING DA ERNIE XI N/A 1/20/2021    Procedure: XI ROBOTIC PROSTATECTOMY;  Surgeon: BOOGIE Hammond MD;  Location: [facility];  Service: Urology;  Laterality: N/A;    trus/bx 2020      TUMOR REMOVAL      back of head       Social History     Socioeconomic History    Marital status:    Occupational History     Employer: [employer]   Tobacco Use    Smoking status: Never Smoker    Smokeless tobacco: Never Used   Substance and Sexual Activity    Alcohol use: No    Drug use: No    Sexual activity: Yes     Partners: Female       Review of patient's allergies indicates:  No Known Allergies      Current Outpatient Medications:     ketoconazole (NIZORAL) 2 % shampoo, Apply topically twice a week., Disp: 500 mL, Rfl: 12    traZODone (DESYREL) 50 MG tablet, Take 1 tablet (50 mg total) by mouth nightly as needed for Insomnia. (Patient taking differently: Take 50 mg by mouth as needed for Insomnia.), Disp: 90 tablet, Rfl: 12    ketoconazole 2 % Foam, , Disp: , Rfl:     tadalafiL (CIALIS) 20 MG Tab, Take 1 tablet (20 mg total) by mouth daily as needed (erectile dysfunction)., Disp: 10 tablet, Rfl: 11    Current Facility-Administered Medications:     alprostadiL (PROSTIN VR) 50 mcg, phentolamine (REGITINE) 1 mg, papaverine 0.03 mg / 5 mL, 0.1 mL, Intracavernosal, 1 time in Clinic/HOD, Dale James MD    PHENYLephrine injection 800 mcg, 800 mcg, Intracavernosal, Once, Dale James MD      PHYSICAL EXAMINATION:    The patient generally appears in good health, is appropriately interactive, and is in no apparent distress.     Eyes: anicteric sclerae, moist conjunctivae; no lid-lag; PERRLA     HENT: Atraumatic; oropharynx clear with moist mucous membranes and no mucosal ulcerations;normal hard and soft palate. No evidence of lymphadenopathy.    Neck: Trachea midline.  No thyromegaly.    Musculoskeletal: No abnormal  gait.    Skin: No lesions.    Mental: Cooperative with normal affect.  Is oriented to time, place, and person.    Neuro: Grossly intact.    Chest: Normal inspiratory effort.   No accessory muscles.  No audible wheezes.  Respirations symmetric on inspiration and expiration.    Heart: Regular rhythm.      Abdomen:  Soft, non-tender. No masses or organomegaly. Bladder is not palpable. No evidence of flank discomfort. No evidence of inguinal hernia.    Genitourinary: The penis is not circumcised with a plaque c/w peyronie's on the shaft. The urethral meatus is normal. The testes, epididymides, and cord structures are normal in size and contour bilaterally. The scrotum is normal in size and contour.    Extremities: No clubbing, cyanosis, or edema        LABS:    UA dipped negative today  Lab Results   Component Value Date    PSA 5.1 (H) 06/26/2020    PSA 3.9 04/11/2018    PSA 3.1 04/11/2017    PSADIAG 0.05 08/02/2022    PSADIAG 0.05 05/04/2022    PSADIAG <0.01 12/14/2021        IMPRESSION:    Encounter Diagnoses   Name Primary?    Peyronie's disease     Erectile dysfunction after radical prostatectomy     Prostate cancer Yes       PLAN:    1. Discussed options for his severe ED today.  Because he has concomitant severe ED and peyronie's, recommend an IPP.    2. Will consult Dr. Castaneda for AUS vs sling at time of penile prosthesis.  3. Patient read the IPP handout and understands the risks associated with this procedure. He knows the risk of infection as well as surgery requirements if it were to become infected. He understands the impact on spontaneous and nocturnal erections, as well as need for replacement and repair, which was clearly outlined in verbal and written form. He was given the chance to ask questions and alternatives were discussed.  4. Risks discussed were You have elected to undergo placement of a penile prosthesis. Several devices are currently available in the marketplace, including those which  are non-inflatable, versus two- or three-piece inflatable prostheses. All of these devices have the capacity to give you the opportunity to have a rigid penis on demand and to be used as frequently and as long as you would like. There are, therefore, many advantages to the use of the prosthesis which you have already discussed with your physician. The goal of this informed consent form is to identify the potential problems that have been recognized to occur with penile prosthesis placement and that you understand the risks of undergoing prosthetic placement. It is important to recognize that as a result of improvements in design as well as better surgical technique, that all of the risks listed below are less than they were even 10 years ago. It is also important to recognize that most of the available studies report on historical data for devices which are now either not manufactured or have undergone structural improvements to reduce those side effects. The complications are simply noted in random order and do not reflect their frequency.    1. Prosthesis mechanical failure occurs as a result of leakage of fluid from the inflatable types of devices. This typically occurs as a result of a fracture in the tubing, most commonly as it emerges out of the scrotal pump, but it can also be due to a leak from the erectile cylinders in the penis. It is felt that this occurs as a result of repetitive mechanical trauma, which weakens the tubing and causes it to crack. When the fluid leaks, it is not something you would be aware of. It does not cause pain. The fluid contained within the device is typically a sterile saline solution that will simply be reabsorbed by the body. What you will recognize is that when you squeeze the pump, there will be no transfer of fluid and no rigidity or inadequate rigidity. The most recent long-term data looking at 5-10 year success reports mechanical failure in the 5-10% range over that period  "of time. Looked at another way, 90-95% of men will have a functional prosthesis in 10 years. These devices are designed to be used for life. Each company has its own warrantee which you should discuss with your physician.    2. Infection is a disastrous complication which usually requires the removal of the prosthesis, as it is rare to be able to clear infection so long as the prosthesis is within the body. Occasionally, the infected prosthesis can be removed, the location of the device can be washed out vigorously with a special antibiotic salvage procedure and then a new device may be able to be immediately placed. When this is not possible, the infected device is removed and then a period of healing will follow where the device can be replaced after a period of healing (6 weeks to 6 months). Delayed replacement of a prosthesis after initial removal is a more complicated operation associated with the potential for not being able to replace the device because of scarring but other problems such as shortening of the penis or change in sensation and shape may also occur.    As a result of improved surgical technique and device design, infection rates are now markedly reduced, typically being reported in the <1-2% range for new prosthesis placements and up to 3% when the prosthesis is uninfected and has mechanically failed.    3. Bleeding and hematoma are rarely a problem. There is likely to be localized swelling as well as "black and blue" of the penis, groin area, and scrotal sack. These will resolve without treatment over 1-3 weeks. Rarely a scrotal hematoma (collection of blood) will develop which can be treated with rest; it will reabsorb with time or, if it is growing in size or painful, it may need to be evacuated surgically (opened up and washed out). The risk of needing a blood transfusion after penile prosthesis placement is extremely rare to nonexistent.    4. Post-operative pain is variable and can be " minimal, but in most men it is quite significant. Your physician will provide you with adequate pain medication to help control the pain, but not necessarily eliminate it entirely. As the prosthesis heals, there will be complete resolution of the pain, such that you will typically not even be aware that the prosthesis is within your body unless you were to touch it directly. Complete pain resolution will most commonly occur within 4-12 weeks postoperatively. Post-operative pain is typically managed with oral narcotic agents. You should be aware that these drugs can cause constipation as well as sleepiness; therefore, you should not be in any position where you might need to make important decision or driving until the pain is under better control without the need for narcotic pain killers. It is recommended that during the first several days after the operation, that you spend as little time as possible on your feet, as this will encourage healing, reduce swelling, and result in more rapid resolution of pain.    5. Loss of length -  Penile shortening is probably the reason that most men are disappointed with the outcome from their prosthesis surgery. Studies have shown that when the flaccid penile stretched length is measured preoperatively, that the actual loss of length following placement of the prosthesis is on average no more than one centimeter (1/3 inch). To reduce the likelihood of loss of length, the surgeon will do his best to place the proper size device that fits your penis. You can expect that the length of your penis will be much like what you see when you grab the head of the penis and pull it straight out away from your body. Many men who believe they have lost length in their penis following prosthesis surgery in fact did not really lose length because of the surgery, but rather because they have not had had a full erection for some time during which there may have been some loss of tissue  elasticity and thereby shortening of the penis. In addition, they may have gained some weight in the pubic area which will cover the penis and make it look shorter. Lastly, they may be expecting that the postoperative result will be like the erections they had when they were a much younger man. Although the goal is to make the penis as long as possible, one can expect that the rigidity of the penis will be much like the erections obtained as a younger man.    6. Decreased girth - Girth is typically not substantially changed as most cylinders can expand and fill the penis satisfactorily, but if there has been scarring within the tissues of the penis, this can prevent expansion and result in a narrower appearing penis. The surgeon will do his best to put the largest cylinder in the penis, but there are limitations to which the tissues can expand. Decreased girth is not a common complaint.    7. Sensory loss - By and large the surgical techniques being used to avoid injury to the sensory nerves of the penis. Therefore, there is rarely any significant change in the sexual sensation of the penis. Some men find that it takes longer for them to experience orgasm. This may occur because they can simply inflate the penis without any sexuall arousal, and then it will seem to take longer for them to become aroused, resulting in the prolonged time to orgasm. Therefore, proper sexual stimulation is important to enjoy the entire sexual experience with a prosthesis.    8. Change in shape - The overall shape or configuration of the penis is rarely altered as a result of placement of any type of prosthesis, but if there is some unidentified scarring involving the penis such as that which occurs with Peyronie's disease, some curvature or indentations including hourglass narrowing can be identified along the shaft. Typically, in the postoperative period, the prosthesis will cause an internal tissue expansion which will correct these  "deformities. This may take 6-9 months occur.    9. Erosion or cylinder extrusion - If the tissues in the tips of the penis are weakened either by previous internal penile disease or as a result of the surgery, the prosthetic cylinder may migrate distally into the head of the penis and may appear to be ready to poke through the skin or the urethra. By all means, if such an irregularity is seen, one should address it with your doctor before the prosthesis is exposed so that it can be corrected without developing infection. This problem occurs in <1% of cases.    10. Pump problems - These include difficulty in activating or deactivating the pump as well as change of pump location due to migration or fixation of the pump to the scrotal skin. These problems are also quite unusual but can happen as a result of an altered healing process or a malposition of the pump by the implanting surgeon. If the pump were to migrate or become fixed or difficult to manipulate because of its position, a simple outpatient scrotal procedure can be performed to reposition the pump which is almost universally successful. This procedure is performed in no more than 1% of cases.    Prosthesis care - In the postoperative period, it is best to take the antibiotics prescribed by your physician, reduce your physical activity to reduce swelling and enhance healing, take pain medicine for your comfort, and avoid submergingthe incision during bathing for a minimum of 1-4 weeks. Specific bathing instructions will be issued by your physician. It is not uncommon to have an inflatable prosthesis partially fill during the postoperative period involuntarily. This is called "auto-inflation." To prevent this problem, it is important that once the prosthesis is activated, which is typically 4-6 weeks after surgery, that you perform what is known as "cycling" of the device. Cycling means complete inflation and then complete deflation of the penile cylinders " twice per day for one month. In doing this, the tissues around the prosthesis are softened and stretched allowing complete deflation of the device into the reservoir. It also will allow you to become more familiar with operating the device and make it easier for you to activate it quickly and inconspicuously when you want to engage in sexual relations. If you have an inflatable device with a scrotal pump, it is best to inflate it without twisting it. The repetitive twisting of the pump can weaken the connections between the tubing and the pump and is the most common cause for mechanical failure of the prosthesis.    It is hoped that this review will inform you of the potential problems associated with your prosthesis and as a result, will make for more realistic expectations regarding the outcome.      Copy to: Stephany

## 2022-08-17 NOTE — PATIENT INSTRUCTIONS
You have elected to undergo placement of a penile prosthesis. Several devices are currently available in the marketplace, including those which are non-inflatable, versus two- or three-piece inflatable prostheses. All of these devices have the capacity to give you the opportunity to have a rigid penis on demand and to be used as frequently and as long as you would like. There are, therefore, many advantages to the use of the prosthesis which you have already discussed with your physician. The goal of this informed consent form is to identify the potential problems that have been recognized to occur with penile prosthesis placement and that you understand the risks of undergoing prosthetic placement. It is important to recognize that as a result of improvements in design as well as better surgical technique, that all of the risks listed below are less than they were even 10 years ago. It is also important to recognize that most of the available studies report on historical data for devices which are now either not manufactured or have undergone structural improvements to reduce those side effects. The complications are simply noted in random order and do not reflect their frequency.    Prosthesis mechanical failure occurs as a result of leakage of fluid from the inflatable types of devices. This typically occurs as a result of a fracture in the tubing, most commonly as it emerges out of the scrotal pump, but it can also be due to a leak from the erectile cylinders in the penis. It is felt that this occurs as a result of repetitive mechanical trauma, which weakens the tubing and causes it to crack. When the fluid leaks, it is not something you would be aware of. It does not cause pain. The fluid contained within the device is typically a sterile saline solution that will simply be reabsorbed by the body. What you will recognize is that when you squeeze the pump, there will be no transfer of fluid and no rigidity or  "inadequate rigidity. The most recent long-term data looking at 5-10 year success reports mechanical failure in the 5-10% range over that period of time. Looked at another way, 90-95% of men will have a functional prosthesis in 10 years. These devices are designed to be used for life. Each company has its own warrantee which you should discuss with your physician.    Infection is a disastrous complication which usually requires the removal of the prosthesis, as it is rare to be able to clear infection so long as the prosthesis is within the body. Occasionally, the infected prosthesis can be removed, the location of the device can be washed out vigorously with a special antibiotic salvage procedure and then a new device may be able to be immediately placed. When this is not possible, the infected device is removed and then a period of healing will follow where the device can be replaced after a period of healing (6 weeks to 6 months). Delayed replacement of a prosthesis after initial removal is a more complicated operation associated with the potential for not being able to replace the device because of scarring but other problems such as shortening of the penis or change in sensation and shape may also occur.    As a result of improved surgical technique and device design, infection rates are now markedly reduced, typically being reported in the <1-2% range for new prosthesis placements and up to 3% when the prosthesis is uninfected and has mechanically failed.    Bleeding and hematoma are rarely a problem. There is likely to be localized swelling as well as "black and blue" of the penis, groin area, and scrotal sack. These will resolve without treatment over 1-3 weeks. Rarely a scrotal hematoma (collection of blood) will develop which can be treated with rest; it will reabsorb with time or, if it is growing in size or painful, it may need to be evacuated surgically (opened up and washed out). The risk of needing a " blood transfusion after penile prosthesis placement is extremely rare to nonexistent.    Post-operative pain is variable and can be minimal, but in most men it is quite significant. Your physician will provide you with adequate pain medication to help control the pain, but not necessarily eliminate it entirely. As the prosthesis heals, there will be complete resolution of the pain, such that you will typically not even be aware that the prosthesis is within your body unless you were to touch it directly. Complete pain resolution will most commonly occur within 4-12 weeks postoperatively. Post-operative pain is typically managed with oral narcotic agents. You should be aware that these drugs can cause constipation as well as sleepiness; therefore, you should not be in any position where you might need to make important decision or driving until the pain is under better control without the need for narcotic pain killers. It is recommended that during the first several days after the operation, that you spend as little time as possible on your feet, as this will encourage healing, reduce swelling, and result in more rapid resolution of pain.    Loss of length -  Penile shortening is probably the reason that most men are disappointed with the outcome from their prosthesis surgery. Studies have shown that when the flaccid penile stretched length is measured preoperatively, that the actual loss of length following placement of the prosthesis is on average no more than one centimeter (1/3 inch). To reduce the likelihood of loss of length, the surgeon will do his best to place the proper size device that fits your penis. You can expect that the length of your penis will be much like what you see when you grab the head of the penis and pull it straight out away from your body. Many men who believe they have lost length in their penis following prosthesis surgery in fact did not really lose length because of the surgery, but  rather because they have not had had a full erection for some time during which there may have been some loss of tissue elasticity and thereby shortening of the penis. In addition, they may have gained some weight in the pubic area which will cover the penis and make it look shorter. Lastly, they may be expecting that the postoperative result will be like the erections they had when they were a much younger man. Although the goal is to make the penis as long as possible, one can expect that the rigidity of the penis will be much like the erections obtained as a younger man.    Decreased girth - Girth is typically not substantially changed as most cylinders can expand and fill the penis satisfactorily, but if there has been scarring within the tissues of the penis, this can prevent expansion and result in a narrower appearing penis. The surgeon will do his best to put the largest cylinder in the penis, but there are limitations to which the tissues can expand. Decreased girth is not a common complaint.    Sensory loss - By and large the surgical techniques being used to avoid injury to the sensory nerves of the penis. Therefore, there is rarely any significant change in the sexual sensation of the penis. Some men find that it takes longer for them to experience orgasm. This may occur because they can simply inflate the penis without any sexuall arousal, and then it will seem to take longer for them to become aroused, resulting in the prolonged time to orgasm. Therefore, proper sexual stimulation is important to enjoy the entire sexual experience with a prosthesis.    Change in shape - The overall shape or configuration of the penis is rarely altered as a result of placement of any type of prosthesis, but if there is some unidentified scarring involving the penis such as that which occurs with Peyronie's disease, some curvature or indentations including hourglass narrowing can be identified along the shaft. Typically,  "in the postoperative period, the prosthesis will cause an internal tissue expansion which will correct these deformities. This may take 6-9 months occur.    Erosion or cylinder extrusion - If the tissues in the tips of the penis are weakened either by previous internal penile disease or as a result of the surgery, the prosthetic cylinder may migrate distally into the head of the penis and may appear to be ready to poke through the skin or the urethra. By all means, if such an irregularity is seen, one should address it with your doctor before the prosthesis is exposed so that it can be corrected without developing infection. This problem occurs in <1% of cases.    Pump problems - These include difficulty in activating or deactivating the pump as well as change of pump location due to migration or fixation of the pump to the scrotal skin. These problems are also quite unusual but can happen as a result of an altered healing process or a malposition of the pump by the implanting surgeon. If the pump were to migrate or become fixed or difficult to manipulate because of its position, a simple outpatient scrotal procedure can be performed to reposition the pump which is almost universally successful. This procedure is performed in no more than 1% of cases.    Prosthesis care - In the postoperative period, it is best to take the antibiotics prescribed by your physician, reduce your physical activity to reduce swelling and enhance healing, take pain medicine for your comfort, and avoid submergingthe incision during bathing for a minimum of 1-4 weeks. Specific bathing instructions will be issued by your physician. It is not uncommon to have an inflatable prosthesis partially fill during the postoperative period involuntarily. This is called "auto-inflation." To prevent this problem, it is important that once the prosthesis is activated, which is typically 4-6 weeks after surgery, that you perform what is known as "cycling" " of the device. Cycling means complete inflation and then complete deflation of the penile cylinders twice per day for one month. In doing this, the tissues around the prosthesis are softened and stretched allowing complete deflation of the device into the reservoir. It also will allow you to become more familiar with operating the device and make it easier for you to activate it quickly and inconspicuously when you want to engage in sexual relations. If you have an inflatable device with a scrotal pump, it is best to inflate it without twisting it. The repetitive twisting of the pump can weaken the connections between the tubing and the pump and is the most common cause for mechanical failure of the prosthesis.    It is hoped that this review will inform you of the potential problems associated with your prosthesis and as a result, will make for more realistic expectations regarding the outcome.

## 2022-08-29 ENCOUNTER — TELEPHONE (OUTPATIENT)
Dept: UROLOGY | Facility: CLINIC | Age: 57
End: 2022-08-29
Payer: COMMERCIAL

## 2022-08-29 NOTE — TELEPHONE ENCOUNTER
----- Message from Sharon Finnegan sent at 8/29/2022  8:02 AM CDT -----  Regarding: self [phone]  Type: Patient Call Back    Who called: self    What is the request in detail: needs to cancel procedure does not want to reschedule.     Can the clinic reply by [facility]? no    Would the patient rather a call back or a response via [facility]? Call back    Best call back number: [phone]

## 2022-09-07 DIAGNOSIS — I10 HTN (HYPERTENSION): ICD-10-CM

## 2022-09-20 ENCOUNTER — TELEPHONE (OUTPATIENT)
Dept: UROLOGY | Facility: CLINIC | Age: 57
End: 2022-09-20
Payer: COMMERCIAL

## 2022-09-20 NOTE — TELEPHONE ENCOUNTER
Pt was contacted on 9-17 and 9-19. The pt did return a call  to our clinic. Per phone conversation today ; please stated that he wanted Sept 27, the pt was informed that we will check the schedule to see if the 27th was available. Upon checking the schedule; it is discovered that the next available will be in November. Will call the pt back the next business day to discuss dates and options.

## 2022-09-22 ENCOUNTER — TELEPHONE (OUTPATIENT)
Dept: UROLOGY | Facility: CLINIC | Age: 57
End: 2022-09-22
Payer: COMMERCIAL

## 2022-09-22 DIAGNOSIS — N52.31 ERECTILE DYSFUNCTION AFTER RADICAL PROSTATECTOMY: ICD-10-CM

## 2022-09-22 DIAGNOSIS — N48.6 PEYRONIE'S DISEASE: Primary | ICD-10-CM

## 2022-09-28 ENCOUNTER — TELEPHONE (OUTPATIENT)
Dept: UROLOGY | Facility: CLINIC | Age: 57
End: 2022-09-28
Payer: COMMERCIAL

## 2022-09-28 NOTE — TELEPHONE ENCOUNTER
9-28-22  Pt was contacted in reference to the status of approving his procedure. Pt was informed that per J. Hilburn   The pt plan is an exclusion/non-covered. I reached out to J. Hilburn for additional information regarding this . Also per the patient he would like for her to f/u and continue to try and get this procedure approved.

## 2022-10-05 DIAGNOSIS — I10 HTN (HYPERTENSION): ICD-10-CM

## 2022-10-07 ENCOUNTER — PATIENT MESSAGE (OUTPATIENT)
Dept: UROLOGY | Facility: CLINIC | Age: 57
End: 2022-10-07
Payer: COMMERCIAL

## 2022-10-10 ENCOUNTER — OFFICE VISIT (OUTPATIENT)
Dept: UROLOGY | Facility: CLINIC | Age: 57
End: 2022-10-10
Payer: COMMERCIAL

## 2022-10-10 ENCOUNTER — TELEPHONE (OUTPATIENT)
Dept: FAMILY MEDICINE | Facility: CLINIC | Age: 57
End: 2022-10-10
Payer: COMMERCIAL

## 2022-10-10 DIAGNOSIS — N52.31 ERECTILE DYSFUNCTION AFTER RADICAL PROSTATECTOMY: Primary | ICD-10-CM

## 2022-10-10 DIAGNOSIS — N39.3 MALE STRESS INCONTINENCE: ICD-10-CM

## 2022-10-10 DIAGNOSIS — N48.6 PEYRONIE'S DISEASE: ICD-10-CM

## 2022-10-10 PROCEDURE — 99499 NO LOS: ICD-10-PCS | Mod: S$GLB,,, | Performed by: UROLOGY

## 2022-10-10 PROCEDURE — 99999 PR PBB SHADOW E&M-EST. PATIENT-LVL II: ICD-10-PCS | Mod: PBBFAC,,,

## 2022-10-10 PROCEDURE — 99999 PR PBB SHADOW E&M-EST. PATIENT-LVL II: CPT | Mod: PBBFAC,,,

## 2022-10-10 PROCEDURE — 99499 UNLISTED E&M SERVICE: CPT | Mod: S$GLB,,, | Performed by: UROLOGY

## 2022-10-10 RX ORDER — SODIUM CHLORIDE 9 MG/ML
INJECTION, SOLUTION INTRAVENOUS CONTINUOUS
Status: CANCELLED | OUTPATIENT
Start: 2022-10-10

## 2022-10-10 NOTE — H&P (VIEW-ONLY)
Urology ([facility]) H&P  Staff: Td El MD    CC: erectile dysfunction and peyronie's    HPI:  Chapo Ohara is a 57 y.o. male with prostate cancer s/p RALP on 1/20/21 by Dr. Hammond.  Since then, he c/o severe ED.  He's tried and failed oral meds.     He also c/o peyronie's disease.  He has ~ 40 degrees of curve to the L.  Has been present for 6 months+.  It's difficult to penetrate due to the curve.     He does have CORY.  He is wearing 1 pads/day.  No hematuria.  No dysuria.  Good FOS.     No bone pain or weight loss.    Is planning on seeing Dr. Castaneda for AUS vs sling at time of penile prosthesis as he is is still very bothered by his urine leakage.     Medical Hx: HTN, HLD, WANDA, PCa, Peyronie's disease  Surgical Hx: RALP    ROS:  Neg except per HPI    Past Medical History:   Diagnosis Date    History of colonic polyps 4/11/2017    HTN (hypertension) 3/7/2014    Hyperlipidemia 03/07/2014    patient states he does not take medication    Prostate cancer     Sleep apnea 9/10/2012    patient does not have sleep apnea       Past Surgical History:   Procedure Laterality Date    COLONOSCOPY N/A 12/11/2015    Procedure: COLONOSCOPY;  Surgeon: Balta Farah MD;  Location: [facility] [place] ENDO;  Service: Endoscopy;  Laterality: N/A;    COLONOSCOPY N/A 3/10/2022    Procedure: COLONOSCOPY;  Surgeon: Natalia Mcqueen MD;  Location: [facility] [place] ENDO;  Service: Endoscopy;  Laterality: N/A;  1/26 covid test 3/7 @ [place]; instructions to portal-st    PROSTATE BIOPSY      ROBOT-ASSISTED LAPAROSCOPIC PELVIC LYMPHADENECTOMY USING DA ERNIE XI Bilateral 1/20/2021    Procedure: XI ROBOTIC LYMPHADENECTOMY, PELVIC;  Surgeon: BOOGIE Hammond MD;  Location: [facility];  Service: Urology;  Laterality: Bilateral;    ROBOT-ASSISTED LAPAROSCOPIC PROSTATECTOMY USING DA ERNIE XI N/A 1/20/2021    Procedure: XI ROBOTIC PROSTATECTOMY;  Surgeon: BOOGIE Hammond MD;  Location: [facility];  Service: Urology;  Laterality: N/A;    trus/bx 2020      TUMOR  "REMOVAL      back of head       Social History     Socioeconomic History    Marital status:    Occupational History     Employer: [employer]   Tobacco Use    Smoking status: Never    Smokeless tobacco: Never   Substance and Sexual Activity    Alcohol use: No    Drug use: No    Sexual activity: Yes     Partners: Female       Family History   Problem Relation Age of Onset    No Known Problems Mother     Hypertension Father        Review of patient's allergies indicates:  No Known Allergies    Current Outpatient Medications on File Prior to Visit   Medication Sig Dispense Refill    ketoconazole (NIZORAL) 2 % shampoo Apply topically twice a week. 500 mL 12    ketoconazole 2 % Foam       tadalafiL (CIALIS) 20 MG Tab Take 1 tablet (20 mg total) by mouth daily as needed (erectile dysfunction). 10 tablet 11    traZODone (DESYREL) 50 MG tablet Take 1 tablet (50 mg total) by mouth nightly as needed for Insomnia. 30 tablet 0     Current Facility-Administered Medications on File Prior to Visit   Medication Dose Route Frequency Provider Last Rate Last Admin    alprostadiL (PROSTIN VR) 50 mcg, phentolamine (REGITINE) 1 mg, papaverine 0.03 mg / 5 mL  0.1 mL Intracavernosal 1 time in Clinic/HOD Dale James MD        PHENYLephrine injection 800 mcg  800 mcg Intracavernosal Once Dale James MD           Anticoagulation:  No    Physical Exam:  Estimated body mass index is 30.57 kg/m² as calculated from the following:    Height as of 8/17/22: 6' 2" (1.88 m).    Weight as of 8/17/22: 108 kg (238 lb 1.6 oz).    General: No acute distress, well developed. AAOx3  Head: Normocephalic, Atraumatic  Eyes: Extra-occular movements intact, No discharge  Neck: supple, symmetrical, trachea midline  Lungs: normal respiratory effort, no respiratory distress, no wheezes  CV: regular rate, 2+ pulses  Abdomen: soft, non-tender, non-distended, no organomegaly, six, 2cm, scars from incisions in the abdomen c/w prior RALP  : The " penis is not circumcised with a plaque c/w peyronie's on the shaft. The urethral meatus is normal. The testes, epididymides, and cord structures are normal in size and contour bilaterally. The scrotum is normal in size and contour. No rashes, abscesses, wounds, pustules, papules, or erythema noted in the groin.   MSK: no edema, no deformities, normal ROM  Skin: skin color, texture, turgor normal.  Neurologic: no focal deficits, sensation intact    Labs:    Urine dipstick today - negative for all components     Lab Results   Component Value Date    WBC 4.82 08/20/2021    HGB 15.1 08/20/2021    HCT 46.5 08/20/2021    MCV 89 08/20/2021     08/20/2021           BMP  Lab Results   Component Value Date     08/20/2021    K 4.2 08/20/2021     08/20/2021    CO2 27 08/20/2021    BUN 15 08/20/2021    CREATININE 1.0 08/20/2021    CALCIUM 9.4 08/20/2021    ANIONGAP 6 (L) 08/20/2021    ESTGFRAFRICA >60 08/20/2021    EGFRNONAA >60 08/20/2021       Lab Results   Component Value Date    PSA 5.1 (H) 06/26/2020    PSA 3.9 04/11/2018    PSA 3.1 04/11/2017       Assessment: Chapo Ohara is a 57 y.o. male with severe ED, unresponsive to oral therapy, and Peyronie's who presents for IPP placement.     Plan:     1. To OR on 10/18/22 for IPP placement.  2. Consents signed   3. I have explained the risk, benefits, and alternatives of the procedure in detail. Specifically, the risks regarding placement of a penile prosthesis were discussed including: chance of prosthesis mechanical failure with 90-95% of men having a functional prosthesis 10 years after surgery, infection of prosthesis requiring removal of the device with rates as low as <1-2%, bleeding/hematoma, injury to the urethra or nearby structures, loss of penile length or girth, sensory loss, or erosion of cylinder. The postoperative course was discussed regarding antibiotics, care of the incision, and activation at 4-6 weeks. The patient voices understanding and  all questions have been answered. The patient agrees to proceed as planned.  4. Told to stop all NSAIDS, ASA one week prior to surgery.  5. Told to call the Urology clinic regarding any new skin lesions or infections prior to the surgery  and not to shave his groin prior to surgery.  6. Follow-up discussion with Dr. Castaneda today regarding planning for possible concomitant AUS vs Male Sling  7. Message sent to PCP for pre-op clearance, will f/u      Matty Mathew MD

## 2022-10-10 NOTE — TELEPHONE ENCOUNTER
----- Message from Sally Gomez MA sent at 10/10/2022 11:03 AM CDT -----  Regarding: Surgery Clearance  Patient  need a surgery clearance with Dr. Sienna quarles.       Thanks    Pt is scheduled for 10/18/2022

## 2022-10-10 NOTE — PROGRESS NOTES
Urology ([facility]) H&P  Staff: Td El MD    CC: erectile dysfunction and peyronie's    HPI:  Chapo Ohara is a 57 y.o. male with prostate cancer s/p RALP on 1/20/21 by Dr. Hammond.  Since then, he c/o severe ED.  He's tried and failed oral meds.     He also c/o peyronie's disease.  He has ~ 40 degrees of curve to the L.  Has been present for 6 months+.  It's difficult to penetrate due to the curve.     He does have CORY.  He is wearing 1 pads/day.  No hematuria.  No dysuria.  Good FOS.     No bone pain or weight loss.    Is planning on seeing Dr. Castaneda for AUS vs sling at time of penile prosthesis as he is is still very bothered by his urine leakage.     Medical Hx: HTN, HLD, WANDA, PCa, Peyronie's disease  Surgical Hx: RALP    ROS:  Neg except per HPI    Past Medical History:   Diagnosis Date    History of colonic polyps 4/11/2017    HTN (hypertension) 3/7/2014    Hyperlipidemia 03/07/2014    patient states he does not take medication    Prostate cancer     Sleep apnea 9/10/2012    patient does not have sleep apnea       Past Surgical History:   Procedure Laterality Date    COLONOSCOPY N/A 12/11/2015    Procedure: COLONOSCOPY;  Surgeon: Balta Farah MD;  Location: [facility] ENDO;  Service: Endoscopy;  Laterality: N/A;    COLONOSCOPY N/A 3/10/2022    Procedure: COLONOSCOPY;  Surgeon: Natalia Mcqueen MD;  Location: [facility] ENDO;  Service: Endoscopy;  Laterality: N/A;  1/26 covid test 3/7 @ [place]; instructions to portal-st    PROSTATE BIOPSY      ROBOT-ASSISTED LAPAROSCOPIC PELVIC LYMPHADENECTOMY USING DA ERNIE XI Bilateral 1/20/2021    Procedure: XI ROBOTIC LYMPHADENECTOMY, PELVIC;  Surgeon: BOOGIE Hammond MD;  Location: [facility];  Service: Urology;  Laterality: Bilateral;    ROBOT-ASSISTED LAPAROSCOPIC PROSTATECTOMY USING DA ERNIE XI N/A 1/20/2021    Procedure: XI ROBOTIC PROSTATECTOMY;  Surgeon: BOOGIE Hammond MD;  Location: [facility];  Service: Urology;  Laterality: N/A;    trus/bx 2020      TUMOR  "REMOVAL      back of head       Social History     Socioeconomic History    Marital status:    Occupational History     Employer: [employer]   Tobacco Use    Smoking status: Never    Smokeless tobacco: Never   Substance and Sexual Activity    Alcohol use: No    Drug use: No    Sexual activity: Yes     Partners: Female       Family History   Problem Relation Age of Onset    No Known Problems Mother     Hypertension Father        Review of patient's allergies indicates:  No Known Allergies    Current Outpatient Medications on File Prior to Visit   Medication Sig Dispense Refill    ketoconazole (NIZORAL) 2 % shampoo Apply topically twice a week. 500 mL 12    ketoconazole 2 % Foam       tadalafiL (CIALIS) 20 MG Tab Take 1 tablet (20 mg total) by mouth daily as needed (erectile dysfunction). 10 tablet 11    traZODone (DESYREL) 50 MG tablet Take 1 tablet (50 mg total) by mouth nightly as needed for Insomnia. 30 tablet 0     Current Facility-Administered Medications on File Prior to Visit   Medication Dose Route Frequency Provider Last Rate Last Admin    alprostadiL (PROSTIN VR) 50 mcg, phentolamine (REGITINE) 1 mg, papaverine 0.03 mg / 5 mL  0.1 mL Intracavernosal 1 time in Clinic/HOD Dale James MD        PHENYLephrine injection 800 mcg  800 mcg Intracavernosal Once Dale James MD           Anticoagulation:  No    Physical Exam:  Estimated body mass index is 30.57 kg/m² as calculated from the following:    Height as of 8/17/22: 6' 2" (1.88 m).    Weight as of 8/17/22: 108 kg (238 lb 1.6 oz).    General: No acute distress, well developed. AAOx3  Head: Normocephalic, Atraumatic  Eyes: Extra-occular movements intact, No discharge  Neck: supple, symmetrical, trachea midline  Lungs: normal respiratory effort, no respiratory distress, no wheezes  CV: regular rate, 2+ pulses  Abdomen: soft, non-tender, non-distended, no organomegaly, six, 2cm, scars from incisions in the abdomen c/w prior RALP  : The " penis is not circumcised with a plaque c/w peyronie's on the shaft. The urethral meatus is normal. The testes, epididymides, and cord structures are normal in size and contour bilaterally. The scrotum is normal in size and contour. No rashes, abscesses, wounds, pustules, papules, or erythema noted in the groin.   MSK: no edema, no deformities, normal ROM  Skin: skin color, texture, turgor normal.  Neurologic: no focal deficits, sensation intact    Labs:    Urine dipstick today - negative for all components     Lab Results   Component Value Date    WBC 4.82 08/20/2021    HGB 15.1 08/20/2021    HCT 46.5 08/20/2021    MCV 89 08/20/2021     08/20/2021           BMP  Lab Results   Component Value Date     08/20/2021    K 4.2 08/20/2021     08/20/2021    CO2 27 08/20/2021    BUN 15 08/20/2021    CREATININE 1.0 08/20/2021    CALCIUM 9.4 08/20/2021    ANIONGAP 6 (L) 08/20/2021    ESTGFRAFRICA >60 08/20/2021    EGFRNONAA >60 08/20/2021       Lab Results   Component Value Date    PSA 5.1 (H) 06/26/2020    PSA 3.9 04/11/2018    PSA 3.1 04/11/2017       Assessment: Chapo Ohara is a 57 y.o. male with severe ED, unresponsive to oral therapy, and Peyronie's who presents for IPP placement.     Plan:     1. To OR on 10/18/22 for IPP placement.  2. Consents signed   3. I have explained the risk, benefits, and alternatives of the procedure in detail. Specifically, the risks regarding placement of a penile prosthesis were discussed including: chance of prosthesis mechanical failure with 90-95% of men having a functional prosthesis 10 years after surgery, infection of prosthesis requiring removal of the device with rates as low as <1-2%, bleeding/hematoma, injury to the urethra or nearby structures, loss of penile length or girth, sensory loss, or erosion of cylinder. The postoperative course was discussed regarding antibiotics, care of the incision, and activation at 4-6 weeks. The patient voices understanding and  all questions have been answered. The patient agrees to proceed as planned.  4. Told to stop all NSAIDS, ASA one week prior to surgery.  5. Told to call the Urology clinic regarding any new skin lesions or infections prior to the surgery  and not to shave his groin prior to surgery.  6. Follow-up discussion with Dr. Castaneda today regarding planning for possible concomitant AUS vs Male Sling  7. Message sent to PCP for pre-op clearance, will f/u      Matty Mathew MD

## 2022-10-11 ENCOUNTER — OFFICE VISIT (OUTPATIENT)
Dept: FAMILY MEDICINE | Facility: CLINIC | Age: 57
End: 2022-10-11
Payer: COMMERCIAL

## 2022-10-11 VITALS
WEIGHT: 241.19 LBS | DIASTOLIC BLOOD PRESSURE: 88 MMHG | SYSTOLIC BLOOD PRESSURE: 130 MMHG | OXYGEN SATURATION: 98 % | HEART RATE: 68 BPM | BODY MASS INDEX: 30.95 KG/M2 | HEIGHT: 74 IN | TEMPERATURE: 98 F

## 2022-10-11 DIAGNOSIS — E55.9 VITAMIN D DEFICIENCY DISEASE: ICD-10-CM

## 2022-10-11 DIAGNOSIS — C61 PROSTATE CANCER: ICD-10-CM

## 2022-10-11 DIAGNOSIS — N39.3 MALE STRESS INCONTINENCE: ICD-10-CM

## 2022-10-11 DIAGNOSIS — N52.31 ERECTILE DYSFUNCTION AFTER RADICAL PROSTATECTOMY: ICD-10-CM

## 2022-10-11 DIAGNOSIS — G47.33 OSA (OBSTRUCTIVE SLEEP APNEA): ICD-10-CM

## 2022-10-11 DIAGNOSIS — E78.49 OTHER HYPERLIPIDEMIA: ICD-10-CM

## 2022-10-11 DIAGNOSIS — I10 ESSENTIAL HYPERTENSION: ICD-10-CM

## 2022-10-11 DIAGNOSIS — Z01.818 PRE-OP TESTING: Primary | ICD-10-CM

## 2022-10-11 PROCEDURE — 99999 PR PBB SHADOW E&M-EST. PATIENT-LVL III: CPT | Mod: PBBFAC,,, | Performed by: INTERNAL MEDICINE

## 2022-10-11 PROCEDURE — 99244 OFF/OP CNSLTJ NEW/EST MOD 40: CPT | Mod: S$GLB,,, | Performed by: INTERNAL MEDICINE

## 2022-10-11 PROCEDURE — 99999 PR PBB SHADOW E&M-EST. PATIENT-LVL III: ICD-10-PCS | Mod: PBBFAC,,, | Performed by: INTERNAL MEDICINE

## 2022-10-11 PROCEDURE — 99244 PR OFFICE CONSULTATION,LEVEL IV: ICD-10-PCS | Mod: S$GLB,,, | Performed by: INTERNAL MEDICINE

## 2022-10-11 RX ORDER — AMLODIPINE BESYLATE 5 MG/1
5 TABLET ORAL NIGHTLY
COMMUNITY
End: 2023-03-13

## 2022-10-11 NOTE — PROGRESS NOTES
Chief complaint, preop      57-year-old black male .  Patient seen in consultation from Urology Dr. El.  He has some significant erectile dysfunction not responsive completely to medications and complicated by Peyronie's disease.  He plans to have an implant.  He also has urinary incontinence after his prostatectomy and also plans to have some form of surgical procedure for incontinence     No recent cardiopulmonary or infectious symptoms.  He has had no complications related to anesthesia, bleeding or DVT in relationship to prior surgeries and no family history of such.  Prior EKG reviewed by me only with sinus bradycardia.  Will update all of his lab work pre surgery and other lab work he might need from an annual standpoint.    He is active.  He is watching his diet.  His cholesterol responded last year.  He had a colonoscopy showing polyps 3/22..  He was also slightly anemic and also was iron deficient when he tried to donate blood.  .  No other bleeding.  Family history of prostate cancer but PSA was up and Dx w cancer-robotic assisted laparoscopic prostatectomy on 1/20/2021.      CC: erectile dysfunction and peyronie's   HPI:  Chapo Ohara is a 57 y.o. male with prostate cancer s/p RALP on 1/20/21 by Dr. Hammond.  Since then, he c/o severe ED.  He's tried and failed oral meds.   He also c/o peyronie's disease.  He has ~ 40 degrees of curve to the L.  Has been present for 6 months+.  It's difficult to penetrate due to the curve.   He does have CORY.  He is wearing 1 pads/day.  No hematuria.  No dysuria.  Good FOS.   No bone pain or weight loss.   Is planning on seeing Dr. Castaneda for AUS vs sling at time of penile prosthesis as he is is still very bothered by his urine leakage.  1. To OR on 10/18/22 for IPP placement.  2. Consents signed   3. I have explained the risk, benefits, and alternatives of the procedure in detail. Specifically, the risks regarding placement of a penile prosthesis were discussed  including: chance of prosthesis mechanical failure with 90-95% of men having a functional prosthesis 10 years after surgery, infection of prosthesis requiring removal of the device with rates as low as <1-2%, bleeding/hematoma, injury to the urethra or nearby structures, loss of penile length or girth, sensory loss, or erosion of cylinder. The postoperative course was discussed regarding antibiotics, care of the incision, and activation at 4-6 weeks. The patient voices understanding and all questions have been answered. The patient agrees to proceed as planned.  4. Told to stop all NSAIDS, ASA one week prior to surgery.  5. Told to call the Urology clinic regarding any new skin lesions or infections prior to the surgery  and not to shave his groin prior to surgery.  6. Follow-up discussion with Dr. Castaneda today regarding planning for possible concomitant AUS vs Male Sling      He had 4 uncles with colon cancer.     1 colon polyp 3/22 -5 yrs    He was given Flomax for nocturia and it helped but caused retrograde ejaculation so he quit .  He does need a refill of his sleeping pill any did find that and it looks like it was trazodone that I found on the med history and so will add that back.    Sounds like he reports taking his blood pressure medications, the amlodipine only as needed.  It was making his legs swell.  We discussed switching to another medication without that side effects    He brings in his blood pressure readings and essentially for greater than 20 days they were all high.  There were in the 150-160 range but now persistently in the 130s.  We discussed that this also is hypertension and explained him that he does need to take a medication every day.      We also discussed his severe hyperlipidemia and the benefits of statin and so will try Crestor and he can look on the good Rx card and if Lipitor is cheaper we will call in accordingly. Not on  statin ???    We discussed his vitamin-D deficiency  and he is taking a multivitamin but probably should add 2000 units.    Apparently he has sleep apnea by remote test but never had CPAP.  Apparently when he took the sleep apnea test he had some dental infections and was told by the dentist that might have affected his sleep study.  He only sleeps about 5 hr per day but is not sleepy during the day or as symptoms of sleep apnea.        ROS:   CONST: weight stable. EYES: no vision change. ENT: no sore throat. CV: no chest pain w/ exertion. RESP: no shortness of breath. GI: no nausea, vomiting, diarrhea. No dysphagia. : no urinary issues. MUSCULOSKELETAL: no new myalgias or arthralgias. SKIN: no new changes. NEURO: no focal deficits. PSYCH: no new issues. ENDOCRINE: no polyuria. HEME: no lymph nodes. ALLERGY: no general pruritis.     PMH:   1.  in 2006, 200 in 2014  2. HTN  3. WANDA , not on CPAP  4. 3 Polyps 3/15 --1 colon polyp 3/22 -5 yrs -4 uncles w colon cancer   5.  Anemia, likely secondary to blood donations 4 times a year  6.  Vitamin-D deficiency  7.  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  Does not smoke or drink.                                                                                         FMH: Father and brother with prostate cancer at age 50.  1 uncle with colon cancer.  Mom with borderline DM.  One sister and four brothers with no problem.    Vitals as above  GGen: no distress  EYES: conjunctiva clear, non-icteric, PERRL  ENT: nose clear, nasal mucosa normal, oropharynx clear and moist, teeth good  NECK:supple, thyroid non-palpable  RESP: effort is good, lungs clear  CV: heart RRR w/o murmur, gallops or rubs; no carotid  bruits, no edema  GI: abdomen soft, non-distended, non-tender, no hepatosplenomegaly  MS: gait normal, no clubbing or cyanosis of the digits,   SKIN: no rashes, warm to touch     Chapo was seen today for pre-op exam.    Diagnoses and all orders for this visit:    Pre-op testing, , low cardiopulmonary risk and patient is medically cleared for surgery.  -     Comprehensive Metabolic Panel; Future  -     CBC Auto Differential; Future  -     TSH; Future  -     Lipid Panel; Future  -     Vitamin D; Future    Erectile dysfunction after radical prostatectomy, plans for implant    Male stress incontinence, surgical treatment plan    Prostate cancer    WANDA (obstructive sleep apnea) chronic and stable    Essential hypertension, chronic and stable  -     Comprehensive Metabolic Panel; Future  -     CBC Auto Differential; Future  -     TSH; Future  -     Lipid Panel; Future  -     Vitamin D; Future    Vitamin D deficiency disease, unsure what supplement he is on but will reassess and make recommendations  -     Vitamin D; Future    Other hyperlipidemia  -     Lipid Panel; Future

## 2022-10-12 ENCOUNTER — LAB VISIT (OUTPATIENT)
Dept: LAB | Facility: HOSPITAL | Age: 57
End: 2022-10-12
Attending: INTERNAL MEDICINE
Payer: COMMERCIAL

## 2022-10-12 ENCOUNTER — PATIENT MESSAGE (OUTPATIENT)
Dept: UROLOGY | Facility: CLINIC | Age: 57
End: 2022-10-12
Payer: COMMERCIAL

## 2022-10-12 DIAGNOSIS — E78.49 OTHER HYPERLIPIDEMIA: ICD-10-CM

## 2022-10-12 DIAGNOSIS — E55.9 VITAMIN D DEFICIENCY DISEASE: ICD-10-CM

## 2022-10-12 DIAGNOSIS — I10 ESSENTIAL HYPERTENSION: ICD-10-CM

## 2022-10-12 DIAGNOSIS — Z01.818 PRE-OP TESTING: ICD-10-CM

## 2022-10-12 LAB
25(OH)D3+25(OH)D2 SERPL-MCNC: 37 NG/ML (ref 30–96)
ALBUMIN SERPL BCP-MCNC: 4.2 G/DL (ref 3.5–5.2)
ALP SERPL-CCNC: 50 U/L (ref 55–135)
ALT SERPL W/O P-5'-P-CCNC: 22 U/L (ref 10–44)
ANION GAP SERPL CALC-SCNC: 6 MMOL/L (ref 8–16)
AST SERPL-CCNC: 27 U/L (ref 10–40)
BASOPHILS # BLD AUTO: 0.03 K/UL (ref 0–0.2)
BASOPHILS NFR BLD: 0.6 % (ref 0–1.9)
BILIRUB SERPL-MCNC: 0.7 MG/DL (ref 0.1–1)
BUN SERPL-MCNC: 9 MG/DL (ref 6–20)
CALCIUM SERPL-MCNC: 9.7 MG/DL (ref 8.7–10.5)
CHLORIDE SERPL-SCNC: 102 MMOL/L (ref 95–110)
CHOLEST SERPL-MCNC: 252 MG/DL (ref 120–199)
CHOLEST/HDLC SERPL: 5.9 {RATIO} (ref 2–5)
CO2 SERPL-SCNC: 29 MMOL/L (ref 23–29)
CREAT SERPL-MCNC: 1 MG/DL (ref 0.5–1.4)
DIFFERENTIAL METHOD: NORMAL
EOSINOPHIL # BLD AUTO: 0.2 K/UL (ref 0–0.5)
EOSINOPHIL NFR BLD: 4 % (ref 0–8)
ERYTHROCYTE [DISTWIDTH] IN BLOOD BY AUTOMATED COUNT: 13.8 % (ref 11.5–14.5)
EST. GFR  (NO RACE VARIABLE): >60 ML/MIN/1.73 M^2
GLUCOSE SERPL-MCNC: 83 MG/DL (ref 70–110)
HCT VFR BLD AUTO: 47.7 % (ref 40–54)
HDLC SERPL-MCNC: 43 MG/DL (ref 40–75)
HDLC SERPL: 17.1 % (ref 20–50)
HGB BLD-MCNC: 15.5 G/DL (ref 14–18)
IMM GRANULOCYTES # BLD AUTO: 0.01 K/UL (ref 0–0.04)
IMM GRANULOCYTES NFR BLD AUTO: 0.2 % (ref 0–0.5)
LDLC SERPL CALC-MCNC: 183.2 MG/DL (ref 63–159)
LYMPHOCYTES # BLD AUTO: 1.3 K/UL (ref 1–4.8)
LYMPHOCYTES NFR BLD: 26.6 % (ref 18–48)
MCH RBC QN AUTO: 29.6 PG (ref 27–31)
MCHC RBC AUTO-ENTMCNC: 32.5 G/DL (ref 32–36)
MCV RBC AUTO: 91 FL (ref 82–98)
MONOCYTES # BLD AUTO: 0.5 K/UL (ref 0.3–1)
MONOCYTES NFR BLD: 10.3 % (ref 4–15)
NEUTROPHILS # BLD AUTO: 2.9 K/UL (ref 1.8–7.7)
NEUTROPHILS NFR BLD: 58.3 % (ref 38–73)
NONHDLC SERPL-MCNC: 209 MG/DL
NRBC BLD-RTO: 0 /100 WBC
PLATELET # BLD AUTO: 241 K/UL (ref 150–450)
PMV BLD AUTO: 10.7 FL (ref 9.2–12.9)
POTASSIUM SERPL-SCNC: 4.4 MMOL/L (ref 3.5–5.1)
PROT SERPL-MCNC: 7.3 G/DL (ref 6–8.4)
RBC # BLD AUTO: 5.24 M/UL (ref 4.6–6.2)
SODIUM SERPL-SCNC: 137 MMOL/L (ref 136–145)
TRIGL SERPL-MCNC: 129 MG/DL (ref 30–150)
TSH SERPL DL<=0.005 MIU/L-ACNC: 1.34 UIU/ML (ref 0.4–4)
WBC # BLD AUTO: 5.04 K/UL (ref 3.9–12.7)

## 2022-10-12 PROCEDURE — 85025 COMPLETE CBC W/AUTO DIFF WBC: CPT | Performed by: INTERNAL MEDICINE

## 2022-10-12 PROCEDURE — 80053 COMPREHEN METABOLIC PANEL: CPT | Performed by: INTERNAL MEDICINE

## 2022-10-12 PROCEDURE — 84443 ASSAY THYROID STIM HORMONE: CPT | Performed by: INTERNAL MEDICINE

## 2022-10-12 PROCEDURE — 80061 LIPID PANEL: CPT | Performed by: INTERNAL MEDICINE

## 2022-10-12 PROCEDURE — 36415 COLL VENOUS BLD VENIPUNCTURE: CPT | Mod: PO | Performed by: INTERNAL MEDICINE

## 2022-10-12 PROCEDURE — 82306 VITAMIN D 25 HYDROXY: CPT | Performed by: INTERNAL MEDICINE

## 2022-10-13 ENCOUNTER — PATIENT MESSAGE (OUTPATIENT)
Dept: UROLOGY | Facility: CLINIC | Age: 57
End: 2022-10-13
Payer: COMMERCIAL

## 2022-10-13 ENCOUNTER — PROCEDURE VISIT (OUTPATIENT)
Dept: UROLOGY | Facility: CLINIC | Age: 57
End: 2022-10-13
Payer: COMMERCIAL

## 2022-10-13 VITALS
HEART RATE: 70 BPM | SYSTOLIC BLOOD PRESSURE: 142 MMHG | HEIGHT: 74 IN | BODY MASS INDEX: 30.93 KG/M2 | DIASTOLIC BLOOD PRESSURE: 97 MMHG | WEIGHT: 241 LBS | TEMPERATURE: 98 F

## 2022-10-13 DIAGNOSIS — G47.09 OTHER INSOMNIA: ICD-10-CM

## 2022-10-13 DIAGNOSIS — N39.3 SUI (STRESS URINARY INCONTINENCE), MALE: Primary | ICD-10-CM

## 2022-10-13 PROCEDURE — 51728 CYSTOMETROGRAM W/VP: CPT | Mod: 26,S$GLB,, | Performed by: UROLOGY

## 2022-10-13 PROCEDURE — 51728 PR COMPLEX CYSTOMETROGRAM VOIDING PRESSURE STUDIES: ICD-10-PCS | Mod: 26,S$GLB,, | Performed by: UROLOGY

## 2022-10-13 PROCEDURE — 51784 PR ANAL/URINARY MUSCLE STUDY: ICD-10-PCS | Mod: 26,51,S$GLB, | Performed by: UROLOGY

## 2022-10-13 PROCEDURE — 52000 CYSTOURETHROSCOPY: CPT | Mod: 59,S$GLB,, | Performed by: UROLOGY

## 2022-10-13 PROCEDURE — 51741 ELECTRO-UROFLOWMETRY FIRST: CPT | Mod: 26,51,S$GLB, | Performed by: UROLOGY

## 2022-10-13 PROCEDURE — 52000 PR CYSTOURETHROSCOPY: ICD-10-PCS | Mod: 59,S$GLB,, | Performed by: UROLOGY

## 2022-10-13 PROCEDURE — 51741 PR UROFLOWMETRY, COMPLEX: ICD-10-PCS | Mod: 26,51,S$GLB, | Performed by: UROLOGY

## 2022-10-13 PROCEDURE — 51797 INTRAABDOMINAL PRESSURE TEST: CPT | Mod: 26,S$GLB,, | Performed by: UROLOGY

## 2022-10-13 PROCEDURE — 51784 ANAL/URINARY MUSCLE STUDY: CPT | Mod: 26,51,S$GLB, | Performed by: UROLOGY

## 2022-10-13 PROCEDURE — 51797 PR VOIDING PRESS STUDY INTRA-ABDOMINAL VOID: ICD-10-PCS | Mod: 26,S$GLB,, | Performed by: UROLOGY

## 2022-10-13 RX ORDER — TRAZODONE HYDROCHLORIDE 50 MG/1
TABLET ORAL
Qty: 90 TABLET | Refills: 3 | Status: SHIPPED | OUTPATIENT
Start: 2022-10-13 | End: 2023-07-25 | Stop reason: SDUPTHER

## 2022-10-13 NOTE — TELEPHONE ENCOUNTER
Refill Decision Note   Chapo Ohara  is requesting a refill authorization.  Brief Assessment and Rationale for Refill:  Approve     Medication Therapy Plan:       Medication Reconciliation Completed: No   Comments:     No Care Gaps recommended.     Note composed:11:31 AM 10/13/2022

## 2022-10-13 NOTE — TELEPHONE ENCOUNTER
No new care gaps identified.  [facility] Embedded Care Gaps. Reference number: [id number]. 10/13/2022   3:49:27 AM EFREMT

## 2022-10-13 NOTE — PATIENT INSTRUCTIONS
_                                                                                                                                                                                             If any problems after hours or weekends, you may call [phone] and ask for the urology resident on call. SIMPLE URODYNAMIC STUDY (SUDS) & CYSTOSCOPY  UROLOGY CLINIC DISCHARGE INSTRUCTIONS    You have had a procedure that will require time to properly heal. Follow the instructions you have been given on how to care for yourself once you are home. Below is additional information to help in your recovery.    ACTIVITY  There are no restrictions in activity. Start doing again the things you did before the procedure.  You may experience a slight burning sensation. You may notice a small amount of blood in your urine. This will clear up within a day. Call the clinic if this continues beyond 48 hours.    DIET  Continue your normal diet. You may eat the same foods you ate before your procedure.  Drink plenty of fluids during the first 24-48 hours following your procedure.    MEDICATIONS  Resume all other previous medications from your prescribing physician.  Continue any pre=procedure antibiotics until they are all gone.    SIGNS AND SYMPTOMS TO REPORT TO THE DOCTOR  Chills or fever greater than 101° F within 24 hours of procedure.  Changes in urination, such as increased bleeding, foul smell, cloudy urine, or painful urination.  Call your doctor with any questions or concerns.    For any emergency situation, call 911 immediately or go to your nearest emergency room.    [facility]  [phone]

## 2022-10-14 NOTE — PROCEDURES
Urodynamic Report for Post-Prostatectomy Incontinence    [facility]       Referring Physician:  No ref. provider found    YOB: 1965  Date of Exam: 10/14/2022    HPI: This is a very pleasant 57 y.o. male seen today for urodynamic evaluation of post-prostatectomy stress incontinence. He reports CORY into 2 pads per day. No incontinence while supine. No radiation. No voiding difficulty.        Cystometrogram:    Position: Sitting  Filling Rate: 30 mL/sec   Catheter: 7F  Fluid:Water       Cath PVR prior: 0 mL Voiding Diary Capacity: Not Available   First Sensation: 46 mL First Desire: 162 mL   Strong Desire: 360 mL Cystometric Capacity: 450 mL       Pdet at jail: 7 cm H2O Compliance: Normal       Detrusor Overactivity (DO): Absent  Volume first DO: No DO   Max DO Pressure: No DO Urgency Incontinence: Absent        Leak Point Pressure Testing:        Abdominal Leak Point Pressure (150mL):  66 cmH2O  Abdominal Leak Point Pressure (250 mL):  34 cmH2O   Abdominal Leak Point Pressure (Cap):  34 cmH2O Stress Induced DO: Absent     Retrograde Leak Point Pressure (Cap):  41 cmH2O        Voiding Study:        Voided Volume: 400 mL PVR: 50 mL   Maximum Flow: 11 mL/sec Average Flow 6 mL/sec   Max Pdet: [id number]  Pdet at Max Flow: 22dkR1L   EMG Storage: Normal Recruitment  EMG Voiding:  flaring with valsalva augmenation           Cystoscopy Findings:        Urethral Stricture No Vesicourethral Anastamotic Stenosis No       Impression:        Sensation:Normal    Capacity: Normal    Compliance:Normal    Detrusor Overactivity:Absent    Continence: CORY (ISD)    Contractility:  Valsalva augmentation of normal detrusor contraction    Emptying:Satisfactory    Coordination:Coordinated Voiding          Cystourethroscopy Note    Procedure Details: Informed consent was obtained and he was sterily prepped and 1% lidocaine jelly was injected per urethra. A flexible cystoscope was inserted into the bladder via the  urethra. There was no evidence of stricture, stenosis, lesions, other obstruction, or diverticulum. Significant findings included a normal unobstructed urethra only. Cystoscopic examination of the bladder revealed orthotopically positioned, normal bilateral ureteral orifices with clear yellow urine effluxing from each orifice. All mucosal surfaces were examined with no apparent stones, tumors, foreign bodies, erythema, trabeculation, diverticula, or ulcers. The procedure was concluded without complications. The patient was not administered a post-procedure antibiotic.     States that valsalva voiding is artifact of the test and doesn't typically void this way.     We will plan to proceed with male sling. Risks and benefits discussed today and all of his questions were answered.

## 2022-10-17 ENCOUNTER — DOCUMENTATION ONLY (OUTPATIENT)
Dept: UROLOGY | Facility: HOSPITAL | Age: 57
End: 2022-10-17
Payer: COMMERCIAL

## 2022-10-17 ENCOUNTER — ANESTHESIA EVENT (OUTPATIENT)
Dept: SURGERY | Facility: HOSPITAL | Age: 57
End: 2022-10-17
Payer: COMMERCIAL

## 2022-10-17 ENCOUNTER — TELEPHONE (OUTPATIENT)
Dept: UROLOGY | Facility: HOSPITAL | Age: 57
End: 2022-10-17
Payer: COMMERCIAL

## 2022-10-17 NOTE — ANESTHESIA PREPROCEDURE EVALUATION
[facility]  Anesthesia Pre-Operative Evaluation         Patient Name/: Chapo Ohara, 1965  MRN: [id number]    SUBJECTIVE:     Pre-operative evaluation for Procedure(s) (LRB):  INSERTION, PENILE PROSTHESIS (N/A)  RECONSTRUCTION (N/A)  INSERTION, BLADDER SLING, MALE, FOR URINARY INCONTINENCE (N/A)     10/17/2022    Chapo Ohara is a 57 y.o. male w/ a significant PMHx of HTN, WANDA not on CPAP, anemia, prostate CA s/p prostatectomy , erectile dysfunction and Peyronie's disease now presents for the above procedure(s).      Prev airway:     Intubation  Performed by: Emily Melendrez  Authorized by: Cory Roldan MD      Intubation:     Induction:  Intravenous    Intubated:  Postinduction    Mask Ventilation:  Easy mask    Attempts:  1    Attempted By:  CRNA    Method of Intubation:  Video laryngoscopy    Blade:  Gallego 4    Laryngeal View Grade: Grade I - full view of chords      Difficult Airway Encountered?: No      Complications:  None    Airway Device:  Oral endotracheal tube    Airway Device Size:  7.5    Style/Cuff Inflation:  Cuffed (inflated to minimal occlusive pressure)    Tube secured:  23    Secured at:  The lips    Placement Verified By:  Capnometry    Complicating Factors:  None    Findings Post-Intubation:  BS equal bilateral and atraumatic/condition of teeth unchanged        Patient Active Problem List   Diagnosis    Sleep apnea    Hyperlipidemia    HTN (hypertension)    Cardiac murmur    Other insomnia    Prostate cancer    Vitamin D deficiency disease    WANDA (obstructive sleep apnea)    Peyronie's disease    Erectile dysfunction after radical prostatectomy    Male stress incontinence       Review of patient's allergies indicates:  No Known Allergies    Current Inpatient Medications:    trimix compound injection  0.1 mL Intracavernosal 1 time in Clinic/HOD    PHENYLephrine  800 mcg Intracavernosal Once       Current Facility-Administered Medications on File Prior  to Encounter   Medication Dose Route Frequency Provider Last Rate Last Admin    alprostadiL (PROSTIN VR) 50 mcg, phentolamine (REGITINE) 1 mg, papaverine 0.03 mg / 5 mL  0.1 mL Intracavernosal 1 time in Clinic/HOD Dale James MD        PHENYLephrine injection 800 mcg  800 mcg Intracavernosal Once Dale James MD         Current Outpatient Medications on File Prior to Encounter   Medication Sig Dispense Refill    ketoconazole (NIZORAL) 2 % shampoo Apply topically twice a week. 500 mL 12    ketoconazole 2 % Foam       tadalafiL (CIALIS) 20 MG Tab Take 1 tablet (20 mg total) by mouth daily as needed (erectile dysfunction). 10 tablet 11       Past Surgical History:   Procedure Laterality Date    COLONOSCOPY N/A 12/11/2015    Procedure: COLONOSCOPY;  Surgeon: Balta Farah MD;  Location: [facility];  Service: Endoscopy;  Laterality: N/A;    COLONOSCOPY N/A 3/10/2022    Procedure: COLONOSCOPY;  Surgeon: Natalia Mcqueen MD;  Location: [facility];  Service: Endoscopy;  Laterality: N/A;  1/26 covid test 3/7 @ [employer]; instructions to portal-st    PROSTATE BIOPSY      ROBOT-ASSISTED LAPAROSCOPIC PELVIC LYMPHADENECTOMY USING DA ERNIE XI Bilateral 1/20/2021    Procedure: XI ROBOTIC LYMPHADENECTOMY, PELVIC;  Surgeon: BOOGIE Hammond MD;  Location: [facility];  Service: Urology;  Laterality: Bilateral;    ROBOT-ASSISTED LAPAROSCOPIC PROSTATECTOMY USING DA ERNIE XI N/A 1/20/2021    Procedure: XI ROBOTIC PROSTATECTOMY;  Surgeon: BOOGIE Hammond MD;  Location: [facility];  Service: Urology;  Laterality: N/A;    trus/bx 2020      TUMOR REMOVAL      back of head       Social History:  Tobacco Use: Low Risk     Smoking Tobacco Use: Never    Smokeless Tobacco Use: Never    Passive Exposure: Not on file       Alcohol Use: Not on file       OBJECTIVE:     Vital Signs Range:  BMI Readings from Last 1 Encounters:   10/13/22 30.94 kg/m²     Last 3 sets of Vitals    Vitals - 1 value per visit 10/11/2022 10/13/2022  10/13/2022   SYSTOLIC 130 - 142   DIASTOLIC 88 - 97   Pulse 68 - 70   Temp 98.1 - 97.7   Resp - - -   SPO2 98 - -   Weight (lb) 241.18 - 241   Weight (kg) 109.4 - 109.317   Height 74 - 74   BMI (Calculated) 31 - 30.9   VISIT REPORT - - -   Pain Score  - 0 -               Significant Labs:        Component Value Date/Time    WBC 5.04 10/12/2022 1127    HGB 15.5 10/12/2022 1127    HCT 47.7 10/12/2022 1127     10/12/2022 1127     10/12/2022 1127    K 4.4 10/12/2022 1127     10/12/2022 1127    CO2 29 10/12/2022 1127    GLU 83 10/12/2022 1127    BUN 9 10/12/2022 1127    CREATININE 1.0 10/12/2022 1127    CALCIUM 9.7 10/12/2022 1127    ALBUMIN 4.2 10/12/2022 1127    PROT 7.3 10/12/2022 1127    ALKPHOS 50 (L) 10/12/2022 1127    BILITOT 0.7 10/12/2022 1127    AST 27 10/12/2022 1127    ALT 22 10/12/2022 1127    HGBA1C 5.4 08/20/2021 0819        Please see Results Review for additional labs.     Diagnostic Studies: No relevant studies.    EKG:   Results for orders placed or performed during the hospital encounter of 11/06/20   EKG 12-lead    Collection Time: 11/06/20  9:29 AM    Narrative    Test Reason : PRE-OP    Vent. Rate : 054 BPM     Atrial Rate : 054 BPM     P-R Int : 156 ms          QRS Dur : 098 ms      QT Int : 418 ms       P-R-T Axes : 023 068 045 degrees     QTc Int : 396 ms    Sinus bradycardia  Otherwise normal ECG    Confirmed by Shane ESCALANTE, Tao OSMAN ([id number]) on 11/7/2020 12:56:59 PM    Referred By:             Confirmed By:Tao Lynn MD       ECHO:  No results found for this or any previous visit.        ASSESSMENT/PLAN:         Pre-op Assessment    I have reviewed the Patient Summary Reports.     I have reviewed the Nursing Notes. I have reviewed the NPO Status.   I have reviewed the Medications.     Review of Systems  Anesthesia Hx:  Denies Family Hx of Anesthesia complications.   Denies Personal Hx of Anesthesia complications.   Cardiovascular:   Exercise tolerance: good  Hypertension Denies MI.  Denies CAD.    Denies CABG/stent.  Denies Dysrhythmias.   Denies Angina.    Pulmonary:   Sleep Apnea    Renal/:  Renal/ Normal     Hepatic/GI:  Hepatic/GI Normal    Neurological:  Neurology Normal Denies TIA.  Denies CVA.    Endocrine:  Endocrine Normal        Physical Exam  General: Well nourished, Cooperative, Alert and Oriented    Airway:  Mallampati: III / II  Mouth Opening: Normal  TM Distance: Normal  Tongue: Normal  Neck ROM: Normal ROM    Dental:  Intact        Anesthesia Plan  Type of Anesthesia, risks & benefits discussed:    Anesthesia Type: Gen ETT  Intra-op Monitoring Plan: Standard ASA Monitors  Post Op Pain Control Plan: multimodal analgesia and IV/PO Opioids PRN  Induction:  IV  Airway Plan: Direct, Post-Induction  Informed Consent: Informed consent signed with the Patient and all parties understand the risks and agree with anesthesia plan.  All questions answered.   ASA Score: 2  Day of Surgery Review of History & Physical: H&P Update referred to the surgeon/provider.    Ready For Surgery From Anesthesia Perspective.     .

## 2022-10-17 NOTE — PRE-PROCEDURE INSTRUCTIONS
PREOP INSTRUCTIONS:  No food,milk or milk products for 8 hours before surgery.  Clear liquids like water,gatorade,apple juice are allowed up until 2 hours before surgery.  Instructed to follow the surgeon's instructions if they differ from these.  Shower instructions as well as directions to the Surgery Center were given.  Encouraged to wear loose fitting,comfortable clothing.  Medication instructions for pm prior to and am of procedure reviewed.  Instructed to avoid taking vitamins,supplements,aspirin and ibuprofen the morning of surgery.    Patient denies any side effects or issues with anesthesia or sedation.     Patient does not know arrival time.Explained that this information comes from the surgeon's office and if they haven't heard from them by 2 or 3 pm to call the office.Patient stated an understanding.

## 2022-10-17 NOTE — TELEPHONE ENCOUNTER
"Spoke with Dr. Torres, patient's PCP, on 10/17/22. States he saw the patient in clinic last week for a medical clearance for his upcoming IPP placement. Dr. Torres states that the patient is "cleared for surgery" from a medical perspective.   "

## 2022-10-18 ENCOUNTER — HOSPITAL ENCOUNTER (OUTPATIENT)
Facility: HOSPITAL | Age: 57
Discharge: HOME | End: 2022-10-19
Attending: UROLOGY | Admitting: UROLOGY
Payer: COMMERCIAL

## 2022-10-18 ENCOUNTER — ANESTHESIA (OUTPATIENT)
Dept: SURGERY | Facility: HOSPITAL | Age: 57
End: 2022-10-18
Payer: COMMERCIAL

## 2022-10-18 DIAGNOSIS — N52.31 ERECTILE DYSFUNCTION AFTER RADICAL PROSTATECTOMY: Primary | ICD-10-CM

## 2022-10-18 DIAGNOSIS — N48.6 PEYRONIE'S DISEASE: ICD-10-CM

## 2022-10-18 LAB
GRAM STN SPEC: NORMAL
GRAM STN SPEC: NORMAL

## 2022-10-18 PROCEDURE — 63600175 PHARM REV CODE 636 W HCPCS: Performed by: UROLOGY

## 2022-10-18 PROCEDURE — 25000003 PHARM REV CODE 250

## 2022-10-18 PROCEDURE — 71000033 HC RECOVERY, INTIAL HOUR: Performed by: UROLOGY

## 2022-10-18 PROCEDURE — 71000015 HC POSTOP RECOV 1ST HR: Performed by: UROLOGY

## 2022-10-18 PROCEDURE — C1815 PROS, URINARY SPH, IMP: HCPCS | Performed by: UROLOGY

## 2022-10-18 PROCEDURE — 25000003 PHARM REV CODE 250: Performed by: STUDENT IN AN ORGANIZED HEALTH CARE EDUCATION/TRAINING PROGRAM

## 2022-10-18 PROCEDURE — C1813 PROSTHESIS, PENILE, INFLATAB: HCPCS | Performed by: UROLOGY

## 2022-10-18 PROCEDURE — 27201423 OPTIME MED/SURG SUP & DEVICES STERILE SUPPLY: Performed by: UROLOGY

## 2022-10-18 PROCEDURE — S5010 5% DEXTROSE AND 0.45% SALINE: HCPCS | Performed by: STUDENT IN AN ORGANIZED HEALTH CARE EDUCATION/TRAINING PROGRAM

## 2022-10-18 PROCEDURE — D9220A PRA ANESTHESIA: ICD-10-PCS | Mod: ,,, | Performed by: ANESTHESIOLOGY

## 2022-10-18 PROCEDURE — 36000707: Performed by: UROLOGY

## 2022-10-18 PROCEDURE — 53440 MALE SLING PROCEDURE: CPT | Mod: 51,,, | Performed by: UROLOGY

## 2022-10-18 PROCEDURE — 63600175 PHARM REV CODE 636 W HCPCS

## 2022-10-18 PROCEDURE — 87075 CULTR BACTERIA EXCEPT BLOOD: CPT | Performed by: UROLOGY

## 2022-10-18 PROCEDURE — 54360 PR PENIS PLASTIC SURG,CORRECT ANGULATN: ICD-10-PCS | Mod: 51,79,, | Performed by: UROLOGY

## 2022-10-18 PROCEDURE — D9220A PRA ANESTHESIA: Mod: ,,, | Performed by: ANESTHESIOLOGY

## 2022-10-18 PROCEDURE — 36000706: Performed by: UROLOGY

## 2022-10-18 PROCEDURE — 54405 PR INSERT,INFLATABLE PENILE PROSTHESIS: ICD-10-PCS | Mod: 79,,, | Performed by: UROLOGY

## 2022-10-18 PROCEDURE — 54360 PENIS PLASTIC SURGERY: CPT | Mod: 51,79,, | Performed by: UROLOGY

## 2022-10-18 PROCEDURE — 87102 FUNGUS ISOLATION CULTURE: CPT | Performed by: UROLOGY

## 2022-10-18 PROCEDURE — 53440 PR SLING OPERATION,CORRECTION,MALE INCONT: ICD-10-PCS | Mod: 51,,, | Performed by: UROLOGY

## 2022-10-18 PROCEDURE — 54405 INSERT MULTI-COMP PENIS PROS: CPT | Mod: 79,,, | Performed by: UROLOGY

## 2022-10-18 PROCEDURE — C1771 REP DEV, URINARY, W/SLING: HCPCS | Performed by: UROLOGY

## 2022-10-18 PROCEDURE — 87070 CULTURE OTHR SPECIMN AEROBIC: CPT | Performed by: UROLOGY

## 2022-10-18 PROCEDURE — 37000008 HC ANESTHESIA 1ST 15 MINUTES: Performed by: UROLOGY

## 2022-10-18 PROCEDURE — 94761 N-INVAS EAR/PLS OXIMETRY MLT: CPT

## 2022-10-18 PROCEDURE — 37000009 HC ANESTHESIA EA ADD 15 MINS: Performed by: UROLOGY

## 2022-10-18 PROCEDURE — 94799 UNLISTED PULMONARY SVC/PX: CPT

## 2022-10-18 PROCEDURE — 87205 SMEAR GRAM STAIN: CPT | Performed by: UROLOGY

## 2022-10-18 PROCEDURE — 71000016 HC POSTOP RECOV ADDL HR: Performed by: UROLOGY

## 2022-10-18 PROCEDURE — 25000003 PHARM REV CODE 250: Performed by: UROLOGY

## 2022-10-18 DEVICE — KIT ACCESSORY PENILE: Type: IMPLANTABLE DEVICE | Site: GROIN | Status: FUNCTIONAL

## 2022-10-18 DEVICE — TIP EXTENDER PENILE PROS 3CM: Type: IMPLANTABLE DEVICE | Site: GROIN | Status: FUNCTIONAL

## 2022-10-18 DEVICE — PUMP CTRL PENL PROS INHIBIZONE: Type: IMPLANTABLE DEVICE | Site: GROIN | Status: FUNCTIONAL

## 2022-10-18 DEVICE — IMPLANTABLE DEVICE: Type: IMPLANTABLE DEVICE | Site: BLADDER | Status: FUNCTIONAL

## 2022-10-18 DEVICE — RESERVOIR FLAT I2 100ML: Type: IMPLANTABLE DEVICE | Site: GROIN | Status: FUNCTIONAL

## 2022-10-18 RX ORDER — LIDOCAINE HYDROCHLORIDE 20 MG/ML
INJECTION, SOLUTION EPIDURAL; INFILTRATION; INTRACAUDAL; PERINEURAL
Status: DISCONTINUED | OUTPATIENT
Start: 2022-10-18 | End: 2022-10-18

## 2022-10-18 RX ORDER — ONDANSETRON 2 MG/ML
4 INJECTION INTRAMUSCULAR; INTRAVENOUS EVERY 8 HOURS PRN
Status: DISCONTINUED | OUTPATIENT
Start: 2022-10-18 | End: 2022-10-19 | Stop reason: HOSPADM

## 2022-10-18 RX ORDER — SODIUM CHLORIDE 9 MG/ML
INJECTION, SOLUTION INTRAVENOUS CONTINUOUS
Status: DISCONTINUED | OUTPATIENT
Start: 2022-10-18 | End: 2022-10-19 | Stop reason: HOSPADM

## 2022-10-18 RX ORDER — ACETAMINOPHEN 500 MG
1000 TABLET ORAL ONCE
Status: COMPLETED | OUTPATIENT
Start: 2022-10-18 | End: 2022-10-18

## 2022-10-18 RX ORDER — VANCOMYCIN HYDROCHLORIDE 1 G/20ML
INJECTION, POWDER, LYOPHILIZED, FOR SOLUTION INTRAVENOUS
Status: DISCONTINUED | OUTPATIENT
Start: 2022-10-18 | End: 2022-10-18 | Stop reason: HOSPADM

## 2022-10-18 RX ORDER — ONDANSETRON 2 MG/ML
INJECTION INTRAMUSCULAR; INTRAVENOUS
Status: DISCONTINUED | OUTPATIENT
Start: 2022-10-18 | End: 2022-10-18

## 2022-10-18 RX ORDER — DEXTROSE MONOHYDRATE AND SODIUM CHLORIDE 5; .45 G/100ML; G/100ML
INJECTION, SOLUTION INTRAVENOUS CONTINUOUS
Status: ACTIVE | OUTPATIENT
Start: 2022-10-18 | End: 2022-10-18

## 2022-10-18 RX ORDER — SODIUM CHLORIDE 0.9 % (FLUSH) 0.9 %
10 SYRINGE (ML) INJECTION
Status: DISCONTINUED | OUTPATIENT
Start: 2022-10-18 | End: 2022-10-18 | Stop reason: HOSPADM

## 2022-10-18 RX ORDER — MIDAZOLAM HYDROCHLORIDE 1 MG/ML
INJECTION, SOLUTION INTRAMUSCULAR; INTRAVENOUS
Status: DISCONTINUED | OUTPATIENT
Start: 2022-10-18 | End: 2022-10-18

## 2022-10-18 RX ORDER — OXYCODONE AND ACETAMINOPHEN 10; 325 MG/1; MG/1
1 TABLET ORAL EVERY 4 HOURS PRN
Status: DISCONTINUED | OUTPATIENT
Start: 2022-10-18 | End: 2022-10-19 | Stop reason: HOSPADM

## 2022-10-18 RX ORDER — OXYCODONE AND ACETAMINOPHEN 5; 325 MG/1; MG/1
1 TABLET ORAL EVERY 4 HOURS PRN
Status: DISCONTINUED | OUTPATIENT
Start: 2022-10-18 | End: 2022-10-19 | Stop reason: HOSPADM

## 2022-10-18 RX ORDER — PHENYLEPHRINE HCL IN 0.9% NACL 1 MG/10 ML
SYRINGE (ML) INTRAVENOUS
Status: DISCONTINUED | OUTPATIENT
Start: 2022-10-18 | End: 2022-10-18

## 2022-10-18 RX ORDER — ZOLPIDEM TARTRATE 5 MG/1
5 TABLET ORAL NIGHTLY PRN
Status: DISCONTINUED | OUTPATIENT
Start: 2022-10-18 | End: 2022-10-19 | Stop reason: HOSPADM

## 2022-10-18 RX ORDER — HYDROMORPHONE HYDROCHLORIDE 1 MG/ML
0.2 INJECTION, SOLUTION INTRAMUSCULAR; INTRAVENOUS; SUBCUTANEOUS EVERY 5 MIN PRN
Status: DISCONTINUED | OUTPATIENT
Start: 2022-10-18 | End: 2022-10-18 | Stop reason: HOSPADM

## 2022-10-18 RX ORDER — DEXAMETHASONE SODIUM PHOSPHATE 4 MG/ML
INJECTION, SOLUTION INTRA-ARTICULAR; INTRALESIONAL; INTRAMUSCULAR; INTRAVENOUS; SOFT TISSUE
Status: DISCONTINUED | OUTPATIENT
Start: 2022-10-18 | End: 2022-10-18

## 2022-10-18 RX ORDER — HALOPERIDOL 5 MG/ML
0.5 INJECTION INTRAMUSCULAR EVERY 10 MIN PRN
Status: DISCONTINUED | OUTPATIENT
Start: 2022-10-18 | End: 2022-10-18 | Stop reason: HOSPADM

## 2022-10-18 RX ORDER — KETAMINE HCL IN 0.9 % NACL 50 MG/5 ML
SYRINGE (ML) INTRAVENOUS
Status: DISCONTINUED | OUTPATIENT
Start: 2022-10-18 | End: 2022-10-18

## 2022-10-18 RX ORDER — ACETAMINOPHEN 325 MG/1
650 TABLET ORAL EVERY 6 HOURS
Status: DISCONTINUED | OUTPATIENT
Start: 2022-10-18 | End: 2022-10-19 | Stop reason: HOSPADM

## 2022-10-18 RX ORDER — PROPOFOL 10 MG/ML
VIAL (ML) INTRAVENOUS
Status: DISCONTINUED | OUTPATIENT
Start: 2022-10-18 | End: 2022-10-18

## 2022-10-18 RX ORDER — DIPHENHYDRAMINE HYDROCHLORIDE 50 MG/ML
12.5 INJECTION INTRAMUSCULAR; INTRAVENOUS EVERY 6 HOURS PRN
Status: DISCONTINUED | OUTPATIENT
Start: 2022-10-18 | End: 2022-10-19 | Stop reason: HOSPADM

## 2022-10-18 RX ORDER — LIDOCAINE HYDROCHLORIDE 10 MG/ML
1 INJECTION, SOLUTION EPIDURAL; INFILTRATION; INTRACAUDAL; PERINEURAL ONCE
Status: COMPLETED | OUTPATIENT
Start: 2022-10-18 | End: 2022-10-18

## 2022-10-18 RX ORDER — NEOSTIGMINE METHYLSULFATE 0.5 MG/ML
INJECTION, SOLUTION INTRAVENOUS
Status: DISCONTINUED | OUTPATIENT
Start: 2022-10-18 | End: 2022-10-18

## 2022-10-18 RX ORDER — ROCURONIUM BROMIDE 10 MG/ML
INJECTION, SOLUTION INTRAVENOUS
Status: DISCONTINUED | OUTPATIENT
Start: 2022-10-18 | End: 2022-10-18

## 2022-10-18 RX ORDER — GENTAMICIN SULFATE 40 MG/ML
INJECTION, SOLUTION INTRAMUSCULAR; INTRAVENOUS
Status: DISCONTINUED | OUTPATIENT
Start: 2022-10-18 | End: 2022-10-18 | Stop reason: HOSPADM

## 2022-10-18 RX ORDER — AMLODIPINE BESYLATE 5 MG/1
5 TABLET ORAL NIGHTLY
Status: DISCONTINUED | OUTPATIENT
Start: 2022-10-18 | End: 2022-10-19 | Stop reason: HOSPADM

## 2022-10-18 RX ORDER — FENTANYL CITRATE 50 UG/ML
INJECTION, SOLUTION INTRAMUSCULAR; INTRAVENOUS
Status: DISCONTINUED | OUTPATIENT
Start: 2022-10-18 | End: 2022-10-18

## 2022-10-18 RX ORDER — BUPIVACAINE HYDROCHLORIDE AND EPINEPHRINE 5; 5 MG/ML; UG/ML
INJECTION, SOLUTION EPIDURAL; INTRACAUDAL; PERINEURAL
Status: DISCONTINUED | OUTPATIENT
Start: 2022-10-18 | End: 2022-10-18 | Stop reason: HOSPADM

## 2022-10-18 RX ADMIN — Medication 25 MG: at 08:10

## 2022-10-18 RX ADMIN — Medication 200 MCG: at 09:10

## 2022-10-18 RX ADMIN — PROPOFOL 200 MG: 10 INJECTION, EMULSION INTRAVENOUS at 07:10

## 2022-10-18 RX ADMIN — SODIUM CHLORIDE, SODIUM GLUCONATE, SODIUM ACETATE, POTASSIUM CHLORIDE, MAGNESIUM CHLORIDE, SODIUM PHOSPHATE, DIBASIC, AND POTASSIUM PHOSPHATE: .53; .5; .37; .037; .03; .012; .00082 INJECTION, SOLUTION INTRAVENOUS at 07:10

## 2022-10-18 RX ADMIN — DEXAMETHASONE SODIUM PHOSPHATE 8 MG: 4 INJECTION INTRA-ARTICULAR; INTRALESIONAL; INTRAMUSCULAR; INTRAVENOUS; SOFT TISSUE at 07:10

## 2022-10-18 RX ADMIN — GLYCOPYRROLATE 0.6 MG: 0.2 INJECTION INTRAMUSCULAR; INTRAVENOUS at 11:10

## 2022-10-18 RX ADMIN — NEOSTIGMINE METHYLSULFATE 5 MG: 0.5 INJECTION, SOLUTION INTRAVENOUS at 11:10

## 2022-10-18 RX ADMIN — ROCURONIUM BROMIDE 20 MG: 50 INJECTION INTRAVENOUS at 08:10

## 2022-10-18 RX ADMIN — ACETAMINOPHEN 650 MG: 325 TABLET ORAL at 12:10

## 2022-10-18 RX ADMIN — OXYCODONE AND ACETAMINOPHEN 1 TABLET: 10; 325 TABLET ORAL at 12:10

## 2022-10-18 RX ADMIN — FENTANYL CITRATE 50 MCG: 50 INJECTION, SOLUTION INTRAMUSCULAR; INTRAVENOUS at 07:10

## 2022-10-18 RX ADMIN — ROCURONIUM BROMIDE 40 MG: 50 INJECTION INTRAVENOUS at 10:10

## 2022-10-18 RX ADMIN — SODIUM CHLORIDE: 0.9 INJECTION, SOLUTION INTRAVENOUS at 06:10

## 2022-10-18 RX ADMIN — ONDANSETRON 4 MG: 2 INJECTION INTRAMUSCULAR; INTRAVENOUS at 11:10

## 2022-10-18 RX ADMIN — SODIUM CHLORIDE: 0.9 INJECTION, SOLUTION INTRAVENOUS at 07:10

## 2022-10-18 RX ADMIN — ACETAMINOPHEN 1000 MG: 500 TABLET ORAL at 06:10

## 2022-10-18 RX ADMIN — LIDOCAINE HYDROCHLORIDE 80 MG: 20 INJECTION, SOLUTION EPIDURAL; INFILTRATION; INTRACAUDAL; PERINEURAL at 07:10

## 2022-10-18 RX ADMIN — ROCURONIUM BROMIDE 50 MG: 50 INJECTION INTRAVENOUS at 07:10

## 2022-10-18 RX ADMIN — LIDOCAINE HYDROCHLORIDE 10 MG: 10 INJECTION, SOLUTION EPIDURAL; INFILTRATION; INTRACAUDAL; PERINEURAL at 06:10

## 2022-10-18 RX ADMIN — DEXTROSE AND SODIUM CHLORIDE: 5; .45 INJECTION, SOLUTION INTRAVENOUS at 12:10

## 2022-10-18 RX ADMIN — GENTAMICIN SULFATE 400 MG: 40 INJECTION, SOLUTION INTRAMUSCULAR; INTRAVENOUS at 06:10

## 2022-10-18 RX ADMIN — Medication 15 MG: at 09:10

## 2022-10-18 RX ADMIN — AMLODIPINE BESYLATE 5 MG: 5 TABLET ORAL at 08:10

## 2022-10-18 RX ADMIN — VANCOMYCIN HYDROCHLORIDE 1500 MG: 1.5 INJECTION, POWDER, LYOPHILIZED, FOR SOLUTION INTRAVENOUS at 06:10

## 2022-10-18 RX ADMIN — MIDAZOLAM 2 MG: 1 INJECTION INTRAMUSCULAR; INTRAVENOUS at 07:10

## 2022-10-18 RX ADMIN — FENTANYL CITRATE 50 MCG: 50 INJECTION, SOLUTION INTRAMUSCULAR; INTRAVENOUS at 10:10

## 2022-10-18 RX ADMIN — Medication 10 MG: at 11:10

## 2022-10-18 RX ADMIN — FENTANYL CITRATE 50 MCG: 50 INJECTION, SOLUTION INTRAMUSCULAR; INTRAVENOUS at 08:10

## 2022-10-18 NOTE — OP NOTE
Male Urethral Sling Sling Operative Note    Date: 10/18/2022    Preoperative Diagnosis:   Stress Urinary Incontinence (male)    Postoperative Diagnosis:  Stress Urinary Incontinence (male)    Procedure:  Male Urethral Sling (Virtue) (37860)  Cystourethroscopy (43092)    Attending Surgeon: Jason Castaneda MD    Anesthesia: General Endotracheal    EBL: 50 mL    Complications: None    Findings:   Normal Cystourethroscopy  Cessation of flow from irrigant through catheter when prepubic and transobturator arms were tensioned both together and individually     Drains: None    Reason for procedure: Chapo Ohara is a very pleasant 57 y.o. male who presented with a history of post-prostatectomy stress urinary incontinence. After trials of more conservative therapy including pelvic floor exercises, he was offered options including male urethral sling and artificial urinary sphincter.     Procedure in detail:  Informed consent was obtained by explaining all risks and benefits of the procedure to the patient in detail.  After informed consent, the patient was brought to the OR where General Endotracheal anesthesia  was administered by the anesthesia staff. Appropriate perioperative antibiotics were given within 30 minutes of beginning the procedure. A formal timeout was performed prior to the procedure. After induction, the patient was gently placed in lithotomy position with all pressure points padded. Bilateral sequential compression devices were applied and activated prior to induction. The patient was prepped and draped in standard fashion.    A 16 Thai haley catheter was placed to gravity. We made a midline incision in the perineum from the bottom of the scrotum to 2 cm above the anus approximately 6-8 cm in length. Next we carried down through the subcutaneous tissue in the midline until the bulbospongiosus muscle was identified. Dissection in the loose tissue laterally to the bulbospongiosus muscle was carried out  bilaterally until the pubic rami were identified. The overlying tissue was swept off the rami exposing them further. The Timo retractor was placed for exposure.      The central tendon was identified and incised using electrocautery such that an additional 2-3 cm of urethral mobility was achieved.  Landmarks for the transobturator arms of the sling, 2 cm below the abductor longus tendon were marked followed by landmarks for the prepubic arms 2 cm from the midline of the pubic symphysis just over the pubic bone.      The attachment suture for the left sling arm was attached to the trocar. The a finger was placed just behind the inferior pubic ramus and over this the trocar was advanced to the pubic ramus. With the handle directed in line with the ipsilateral shoulder, the trocar was advanced behind the pubic ramus as one unit and handle was then brought down, parallel to the floor, to direct the tip of the trocar through the obturator foramen. The trocar tip with the sling arm attached was brought out through a stab incision in the skin and the sling arm was further brought out through the foramen. An identical procedure was performed on the patient's right side.     The trocar was then passed through our pubic skin incisions, anterior to the pubic bone and out through our perineal incision lateral to the urethra. Care was taken to avoid the cord structures. The sling was attached to the trocar and pulled out through the skin. This was done bilaterally.     Prior the pulling the sling through, flexible cystoscopy was performed demonstrating no evidence of urethral stricture or stenosis as well as no primary bladder pathology. Furthermore, there was no evidence of perforation by the sling arms.The catheter was replaced distal to the sphincter with 2-3 ml placed into the balloon. The catheter was attached to the irrigation raised to 60 cm above the pubic symphysis.     The prepubic and transobturator sling arms were  tensioned until there was cessation of flow through the catheter. At this tension, the prepubic sling arms were fixated to the periosteum of the pubic rami using double-throws of 0-Prolene. The transobturator sling arms were tunneled back into the incision. Excess mesh was clipped from all sling arms and the skin incisions were closed with Dermabond.      The wound was irrigated and hemostasis was observed. The wound was closed deeply with two layers of 2-0 Vicryl and the skin was closed with a 3-0 monocryl suture in a running horizontal mattress fashion. Dermabond was applied.     The patient was then prepped for the IPP portion of the procedure.

## 2022-10-18 NOTE — NURSING TRANSFER
Nursing Transfer Note      10/18/2022     Reason patient is being transferred: Post Op    Transfer To: [id number], report given to RAFIA Silva    Transfer via stretcher    Transfer with : IV fluid infusion    Transported by PCT    Medicines sent: none    Any special needs or follow-up needed:     Chart send with patient: Yes    Notified: spouse via phone call,     Patient reassessed at: 10-18-22 at 13:00

## 2022-10-18 NOTE — ANESTHESIA POSTPROCEDURE EVALUATION
Anesthesia Post Evaluation    Patient: Chapo Ohara    Procedure(s) Performed: Procedure(s) (LRB):  INSERTION, PENILE PROSTHESIS (N/A)  RECONSTRUCTION (N/A)  INSERTION, BLADDER SLING, MALE, FOR URINARY INCONTINENCE (N/A)    Final Anesthesia Type: general      Patient location during evaluation: PACU  Patient participation: Yes- Able to Participate  Level of consciousness: awake and alert  Post-procedure vital signs: reviewed and stable  Pain management: adequate  Airway patency: patent    PONV status at discharge: No PONV  Anesthetic complications: no      Cardiovascular status: blood pressure returned to baseline  Respiratory status: unassisted  Hydration status: euvolemic  Follow-up not needed.          Vitals Value Taken Time   /97 10/18/22 1331   Temp 36.7 °C (98.1 °F) 10/18/22 1300   Pulse 84 10/18/22 1340   Resp 19 10/18/22 1337   SpO2 99 % 10/18/22 1340   Vitals shown include unvalidated device data.      Event Time   Out of Recovery 12:00:00         Pain/Alec Score: Pain Rating Prior to Med Admin: 6 (10/18/2022 12:40 PM)  Alec Score: 10 (10/18/2022 12:30 PM)

## 2022-10-18 NOTE — ANESTHESIA PROCEDURE NOTES
Intubation    Date/Time: 10/18/2022 7:38 AM  Performed by: Kurt Melendez DO  Authorized by: Faisal Oliver III, MD     Intubation:     Induction:  Intravenous    Intubated:  Postinduction    Mask Ventilation:  Easy with oral airway    Attempts:  1    Attempted By:  Student    Method of Intubation:  Video laryngoscopy    Blade:  Gallego 4    Laryngeal View Grade: Grade I - full view of cords      Difficult Airway Encountered?: No      Complications:  None    Airway Device:  Oral endotracheal tube    Airway Device Size:  7.5    Style/Cuff Inflation:  Cuffed (inflated to minimal occlusive pressure)    Inflation Amount (mL):  8    Tube secured:  22    Secured at:  The lips    Placement Verified By:  Capnometry and Revisualization with laryngoscopy    Complicating Factors:  None    Findings Post-Intubation:  BS equal bilateral and atraumatic/condition of teeth unchanged

## 2022-10-18 NOTE — TRANSFER OF CARE
Anesthesia Transfer of Care Note    Patient: Chapo Ohara    Procedure(s) Performed: Procedure(s) (LRB):  INSERTION, PENILE PROSTHESIS (N/A)  RECONSTRUCTION (N/A)  INSERTION, BLADDER SLING, MALE, FOR URINARY INCONTINENCE (N/A)    Patient location: PACU    Anesthesia Type: general    Transport from OR: Transported from OR on 6-10 L/min O2 by face mask with adequate spontaneous ventilation    Post pain: adequate analgesia    Post assessment: no apparent anesthetic complications    Post vital signs: stable    Level of consciousness: responds to stimulation and sedated    Nausea/Vomiting: no nausea/vomiting    Complications: none    Transfer of care protocol was followed      Last vitals:   Visit Vitals  BP (!) 155/96 (BP Location: Right arm, Patient Position: Lying)   Pulse 69   Temp 36.6 °C (97.8 °F) (Oral)   Resp 16   Ht 6' (1.829 m)   Wt 108.9 kg (240 lb)   SpO2 98%   BMI 32.55 kg/m²

## 2022-10-18 NOTE — OP NOTE
[facility]  Operative Note    Date: 10/18/2022    Pre-Op Diagnosis: Erectile Dysfunction  Peyronie's disease  Urinary incontinence.     Past Medical History:   Diagnosis Date    History of colonic polyps 4/11/2017    HTN (hypertension) 3/7/2014    Hyperlipidemia 03/07/2014    patient states he does not take medication    Prostate cancer     Sleep apnea 9/10/2012    patient does not have sleep apnea         Post-Op Diagnosis:   Same    Procedure(s) Performed:   1.  Inflatable Penile Prosthesis 3 piece device (, [employer])  2.  Plastics operation of the penis to correct angulation via penile modeling    Specimen(s): None     Staff Surgeon: Td El MD    Assistant Surgeon: Donnie Dacosta MD    Anesthesia: General endotracheal anesthesia    Indications:  Chapo Ohara is a 57 y.o. male with a history of erectile dysfunction, Peyronie's disease, and urinary incontinence after radical prostatectomy for prostate cancer.  After discussion with the patient, he has elected to undergo IPP.     Findings:    1.  18 cm cylinder was placed with 3 cm rear tip extender bilaterally  2.  100  mL reservoir    Estimated Blood Loss: Minimal    Drains: 16 [place] haley catheter    Procedure in detail:  After risks and benefits of the procedure were discussed with the patient, informed consent was obtained.  The patient  was taken back to the operating room and first Dr. Castaneda's team performed the male sling procedure. The patient was prepped and scrubbed with betadine for a total of 10 minutes.  A chloraprep was then used as further prep.We subsequently scrubbed our hands for 10 minutes.  He was draped in the usual sterile fashion, and an ioban was placed over the field.  A 16 Fr haley catheter was placed using standard sterile technique and the bladder was drained.      An approximately 4 cm transverse incision was marked at the penoscrotal junction.  This was incised sharply with a 15 blade.   Bovie electrocautery was used to dissect down to Dartos fascia.  Dartos was then incised using Metzenbaum scissors down to the level of the corpora.  A [place] ring retractor was utilized for retraction.  The left corporal body was cleared of its adventitial tissues, exposing the tunica albuginea. The tunica albuginea was then marked for a corporotomy. 2 Stay sutures of 2-0 vicryl were then placed.  A corporotomy was then made between the stay sutures.  The patient's corpora were dilated serially from 10 mm up to 13 mm using Hegars proximally and Corral dilators distally. Care was taken to ensure not to crossover during this portion of the procedure.  Once dilated, the patient's corporotomy was irrigated with antibiotic irrigation.  There was no evidence of urethral injury during this portion of the procedure.  The patient's corpora was then measured using the Eddi dilator.  It measured 21 cm in total length.    Attention was then turned to the contralateral corporal body, and the exact same technique was performed.  Again, the adventitial tissues around it were cleared, exposing the tunica albuginea and it was marked for a corporotomy with the bovie.  A corporotomy was made after  2-0 Vicryl stay sutures were applied to either side of the corporotomy. Again, it was serially dilated from 8 mm up to 13 mm sequentially using Corral and Hegar dilators.  Again, care was taken not to crossover.  Again the corporal length was measured using the Eddi.  It measured 21 cm in total length.    A 18cm cm IPP with 3cm rear tip extender bilaterally was assembled.  The Eddi was then advanced into the corpora and the needle fired through the glans, first on the left and then on the right.  The left IPP was then placed within the corpora and the corpora was closed by tying the previously placed stay sutures.  The IPP was then placed on the right, and the corpora was closed by tying the previously placed stay sutures.    At  this time, the device was inflated.  There was good glans support with no SST deformity but the penis was curved dorsolaterally to the left ~40 degrees. This was determined to be due to Peyronie's disease.   A plastics operation was then performed via penile modeling in the standard fashion.  With the device inflated, the penis was modeled by carefully bending the erect penis 180 degrees opposite the point of maximal curvature for 90 seconds. The curvature was improved.  Two additional pumps were administered to allow more fluid into the device. This procedure was then repeated for another 90 seconds in similar fashion. The curvature was very much improved and well below 30 degrees.  The device was then deflated.    Attention was then turned to the patient's left inguinal region.  The space of Retzius was not entered and an ectopic location just posterior to the patient's pubic bone and above the transversalis fascia was developed. Blunt finger dissection was used to clear space for the reservoir.  A 100 mL reservoir was then advanced and filled to 100 mL.  The reservoir remained well concealed.         Immediately following this, a subdartos pocket was made in the dependent portion of the scrotum. The pump device was then advanced into the dartos pouch and secured with a pursestring 2-0 Vicryl suture.  The pump, cylinders and tubing were then connected to the reservoir in the standard fashion.    The device was then inflated again.  There was good glans support with no SST deformity.The device was deflated and the bladder was drained.    It should be noted that copious amounts of antibiotic irrigation was used throughout the case.    Dartos was reapproximated in three separate layers using 2-0 Vicryl in a running fashion.  Skin was then reapproximated using 3-0 chromic in a running horizontal mattress fashion.  Derma thompson was applied and a mummy wrap was created with scrotal fluffs and support.      The patient  tolerated the procedure well and was transferred to the recovery room in stable condition.    Disposition:  The patient will be admitted to the urology service for overnight observation.  We plan for a voiding trial tomorrow morning as well as morning IV antibiotics.  He will be discharged with PO antibiotics and instructions to not use his pump until instructed to do so by Dr. El.      Donnie Dacosta MD

## 2022-10-19 VITALS
OXYGEN SATURATION: 99 % | RESPIRATION RATE: 18 BRPM | BODY MASS INDEX: 32.51 KG/M2 | TEMPERATURE: 99 F | SYSTOLIC BLOOD PRESSURE: 139 MMHG | DIASTOLIC BLOOD PRESSURE: 82 MMHG | HEART RATE: 84 BPM | HEIGHT: 72 IN | WEIGHT: 240 LBS

## 2022-10-19 PROCEDURE — 25000003 PHARM REV CODE 250: Performed by: STUDENT IN AN ORGANIZED HEALTH CARE EDUCATION/TRAINING PROGRAM

## 2022-10-19 PROCEDURE — 63600175 PHARM REV CODE 636 W HCPCS: Performed by: STUDENT IN AN ORGANIZED HEALTH CARE EDUCATION/TRAINING PROGRAM

## 2022-10-19 PROCEDURE — 94761 N-INVAS EAR/PLS OXIMETRY MLT: CPT

## 2022-10-19 RX ORDER — DICLOXACILLIN SODIUM 250 MG/1
500 CAPSULE ORAL EVERY 6 HOURS
Qty: 16 CAPSULE | Refills: 0 | Status: SHIPPED | OUTPATIENT
Start: 2022-10-19 | End: 2022-10-21

## 2022-10-19 RX ORDER — OXYCODONE AND ACETAMINOPHEN 5; 325 MG/1; MG/1
1 TABLET ORAL EVERY 4 HOURS PRN
Qty: 10 EACH | Refills: 0 | Status: SHIPPED | OUTPATIENT
Start: 2022-10-19 | End: 2022-11-14

## 2022-10-19 RX ORDER — MUPIROCIN 20 MG/G
OINTMENT TOPICAL 3 TIMES DAILY
Qty: 22 G | Refills: 0 | Status: SHIPPED | OUTPATIENT
Start: 2022-10-19 | End: 2022-10-22

## 2022-10-19 RX ORDER — POLYETHYLENE GLYCOL 3350 17 G/17G
17 POWDER, FOR SOLUTION ORAL DAILY
Qty: 238 G | Refills: 0 | Status: SHIPPED | OUTPATIENT
Start: 2022-10-19 | End: 2022-11-02

## 2022-10-19 RX ADMIN — VANCOMYCIN HYDROCHLORIDE 1500 MG: 1.5 INJECTION, POWDER, LYOPHILIZED, FOR SOLUTION INTRAVENOUS at 05:10

## 2022-10-19 RX ADMIN — GENTAMICIN SULFATE 400 MG: 40 INJECTION, SOLUTION INTRAMUSCULAR; INTRAVENOUS at 04:10

## 2022-10-19 NOTE — ASSESSMENT & PLAN NOTE
Lev Cowan is a 56yo male with erectile dysfunction and stress incontinence who underwent IPP and urethral sling placement yesterday.     -Penile dressing and haley removed this morning. Voiding trial today.   -Ambulate QID  -Regular diet  -Ice to scrotum  -Plan for discharge today

## 2022-10-19 NOTE — DISCHARGE SUMMARY
[facility] - Surgery  Urology  Discharge Summary      Patient Name: Chapo Ohara  MRN: [id number]  Admission Date: 10/18/2022  Hospital Length of Stay: 0 days  Discharge Date and Time:  10/19/2022 7:58 AM  Attending Physician: Td El MD   Discharging Provider: JINNY ARROYO MD  Primary Care Physician: Prashanth Torres MD    HPI:   Chapo Ohara is a 57 y.o. male with prostate cancer s/p RALP on 1/20/21 by Dr. Hammond.  Since then, he c/o severe ED.  He's tried and failed oral meds.     He also c/o peyronie's disease.  He has ~ 40 degrees of curve to the L.  Has been present for 6 months+.  It's difficult to penetrate due to the curve.     He does have CORY.  He is wearing 1 pads/day.  No hematuria.  No dysuria.  Good FOS.     No bone pain or weight loss.     Is planning on seeing Dr. Castaneda for AUS vs sling at time of penile prosthesis as he is is still very bothered by his urine leakage.      Medical Hx: HTN, HLD, WANDA, PCa, Peyronie's disease  Surgical Hx: RALP      Procedure(s) (LRB):  INSERTION, PENILE PROSTHESIS (N/A)  RECONSTRUCTION (N/A)  INSERTION, BLADDER SLING, MALE, FOR URINARY INCONTINENCE (N/A)     Indwelling Lines/Drains at time of discharge:   Lines/Drains/Airways     Drain  Duration                Urethral Catheter 10/18/22 0814 Non-latex;Straight-tip 16 Fr. <1 day                Hospital Course (synopsis of major diagnoses, care, treatment, and services provided during the course of the hospital stay): Patient presented to [facility] and underwent above procedure. Tolerated the procedure well and was transferred to the floor after recovery from anesthesia. Please see the operative note for further procedure details. Vitals remained stable throughout the post operative period. Physical exam was appropriate for post operative state. The haley was removed on POD1 and the patient was able to void without difficulty. Diet was advanced, and the patient was able to tolerate a regular diet prior to  discharge. Patient was ambulating on POD1 without issues. Patient was deemed suitable for discharge on No discharge date for patient encounter.       Goals of Care Treatment Preferences:  Code Status: Full Code    Living Will: Yes              Consults: none    Significant Diagnostic Studies: none    Pending Diagnostic Studies:     None          Final Active Diagnoses:    Diagnosis Date Noted POA    Erectile dysfunction after radical prostatectomy [N52.31] 08/17/2022 Yes      Problems Resolved During this Admission:       Patient Active Problem List   Diagnosis    Sleep apnea    Hyperlipidemia    HTN (hypertension)    Cardiac murmur    Other insomnia    Prostate cancer    Vitamin D deficiency disease    WANDA (obstructive sleep apnea)    Peyronie's disease    Erectile dysfunction after radical prostatectomy    Male stress incontinence       Discharged Condition: good    Disposition: Home or Self Care    Follow Up: 11/14/22 with Dr El    Patient Instructions:      Diet Adult Regular     Lifting restrictions     Notify your health care provider if you experience any of the following:  temperature >100.4     Notify your health care provider if you experience any of the following:  persistent nausea and vomiting or diarrhea     Notify your health care provider if you experience any of the following:  severe uncontrolled pain     Notify your health care provider if you experience any of the following:  redness, tenderness, or signs of infection (pain, swelling, redness, odor or green/yellow discharge around incision site)     Notify your health care provider if you experience any of the following:  difficulty breathing or increased cough     Notify your health care provider if you experience any of the following:  severe persistent headache     Notify your health care provider if you experience any of the following:  worsening rash     Notify your health care provider if you experience any of the  following:  persistent dizziness, light-headedness, or visual disturbances     Notify your health care provider if you experience any of the following:  increased confusion or weakness     Notify your health care provider if you experience any of the following:     Activity as tolerated     Medications:  Reconciled Home Medications:      Medication List      START taking these medications    dicloxacillin 250 MG capsule  Commonly known as: DYNAPEN  Take 2 capsules (500 mg total) by mouth every 6 (six) hours. for 2 days     mupirocin 2 % ointment  Commonly known as: BACTROBAN  Apply topically 3 (three) times daily. for 3 days     oxyCODONE-acetaminophen 5-325 mg per tablet  Commonly known as: PERCOCET  Take 1 tablet by mouth every 4 (four) hours as needed for Pain.     polyethylene glycol 17 gram/dose powder  Commonly known as: GLYCOLAX  Take 17 g by mouth once daily. for 14 days        ASK your doctor about these medications    amLODIPine 5 MG tablet  Commonly known as: NORVASC  Take 5 mg by mouth every evening.     * ketoconazole 2 % Foam     * ketoconazole 2 % shampoo  Commonly known as: NIZORAL  Apply topically twice a week.     tadalafiL 20 MG Tab  Commonly known as: CIALIS  Take 1 tablet (20 mg total) by mouth daily as needed (erectile dysfunction).     traZODone 50 MG tablet  Commonly known as: DESYREL  TAKE 1 TABLET(50 MG) BY MOUTH EVERY NIGHT AS NEEDED FOR INSOMNIA         * This list has 2 medication(s) that are the same as other medications prescribed for you. Read the directions carefully, and ask your doctor or other care provider to review them with you.                Time spent on the discharge of patient: 15 minutes    JINNY ARROYO MD  Urology  [facility]

## 2022-10-19 NOTE — PROGRESS NOTES
[facility] - [facility]  Urology  Progress Note    Patient Name: Chapo Ohara  MRN: [id number]  Admission Date: 10/18/2022  Hospital Length of Stay: 0 days  Code Status: Prior   Attending Provider: Td El MD   Primary Care Physician: Prashanth Torres MD    Subjective:     HPI:  Chapo Ohara is a 57 y.o. male with prostate cancer s/p RALP on 1/20/21 by Dr. Hammond.  Since then, he c/o severe ED.  He's tried and failed oral meds.     He also c/o peyronie's disease.  He has ~ 40 degrees of curve to the L.  Has been present for 6 months+.  It's difficult to penetrate due to the curve.     He does have CORY.  He is wearing 1 pads/day.  No hematuria.  No dysuria.  Good FOS.     No bone pain or weight loss.     Is planning on seeing Dr. Castaneda for AUS vs sling at time of penile prosthesis as he is is still very bothered by his urine leakage.      Medical Hx: HTN, HLD, WANDA, PCa, Peyronie's disease  Surgical Hx: RALP      Interval History: AFVSS. Received vanc and gent this morning. Adequate UOP. Ambulated. Tolerating diet.     Objective:     Temp:  [97 °F (36.1 °C)-99.2 °F (37.3 °C)] 99.2 °F (37.3 °C)  Pulse:  [] 95  Resp:  [14-22] 18  SpO2:  [95 %-100 %] 97 %  BP: (136-171)/() 148/80     Body mass index is 32.55 kg/m².           Drains       Drain  Duration                  Urethral Catheter 10/18/22 0814 Non-latex;Straight-tip 16 Fr. <1 day                    Physical Exam  Vitals reviewed.   Constitutional:       General: He is not in acute distress.     Appearance: He is well-developed.   HENT:      Head: Normocephalic and atraumatic.   Cardiovascular:      Rate and Rhythm: Normal rate.   Pulmonary:      Effort: Pulmonary effort is normal. No respiratory distress.      Breath sounds: No stridor.   Abdominal:      General: There is no distension.      Palpations: Abdomen is soft.      Tenderness: There is no abdominal tenderness.   Genitourinary:     Comments: Penoscrotal incision CDI. Castaneda draining  clear yellow  Musculoskeletal:         General: Normal range of motion.      Cervical back: Normal range of motion.   Skin:     General: Skin is warm and dry.   Neurological:      Mental Status: He is alert.   Psychiatric:         Behavior: Behavior normal.       Significant Labs:    BMP:  Recent Labs   Lab 10/12/22  1127      K 4.4      CO2 29   BUN 9   CREATININE 1.0   CALCIUM 9.7       CBC:   Recent Labs   Lab 10/12/22  1127   WBC 5.04   HGB 15.5   HCT 47.7          All pertinent labs results from the past 24 hours have been reviewed.    Significant Imaging:  All pertinent imaging results/findings from the past 24 hours have been reviewed.                    Assessment/Plan:     Erectile dysfunction after radical prostatectomy  Lev Cowan is a 56yo male with erectile dysfunction and stress incontinence who underwent IPP and urethral sling placement yesterday.     -Penile dressing and haley removed this morning. Voiding trial today.   -Ambulate QID  -Regular diet  -Ice to scrotum  -Plan for discharge today        VTE Risk Mitigation (From admission, onward)         Ordered     IP VTE HIGH RISK PATIENT  Once         10/18/22 1155     Place sequential compression device  Until discontinued         10/18/22 1155     Place MARLINE hose  Until discontinued         10/18/22 0548     Place sequential compression device  Until discontinued         10/18/22 0548                JINNY ARROYO MD  Urology  [facility] - [facility]

## 2022-10-19 NOTE — HPI
Chapo Ohara is a 57 y.o. male with prostate cancer s/p RALP on 1/20/21 by Dr. Hammond.  Since then, he c/o severe ED.  He's tried and failed oral meds.     He also c/o peyronie's disease.  He has ~ 40 degrees of curve to the L.  Has been present for 6 months+.  It's difficult to penetrate due to the curve.     He does have CORY.  He is wearing 1 pads/day.  No hematuria.  No dysuria.  Good FOS.     No bone pain or weight loss.     Is planning on seeing Dr. Castaneda for AUS vs sling at time of penile prosthesis as he is is still very bothered by his urine leakage.      Medical Hx: HTN, HLD, WANDA, PCa, Peyronie's disease  Surgical Hx: RALP

## 2022-10-19 NOTE — PLAN OF CARE
[facility] - [facility]  Discharge Final Note    Primary Care Provider: Prashanth Torres MD    Expected Discharge Date: 10/19/2022    Final Discharge Note (most recent)       Final Note - 10/19/22 1125          Final Note    Assessment Type Final Discharge Note     Anticipated Discharge Disposition Home or Self Care     What phone number can be called within the next 1-3 days to see how you are doing after discharge? --   [phone]    Hospital Resources/Appts/Education Provided Appointments scheduled and added to AVS                     Important Message from Medicare      Future Appointments   Date Time Provider Department Center   10/25/2022 10:00 AM Prashanth Torres MD [facility]   11/14/2022  9:15 AM Td El MD [facility]     Patient discharged home to care of family on 10/19/22.       Contact Info       Prashanth Torres MD   Specialty: Internal Medicine   Relationship: PCP - General    [address]   Phone: [phone]       Next Steps: Follow up    Prashanth Torres MD   Specialty: Internal Medicine   Relationship: PCP - General    [address]   Phone: [phone]       Next Steps: Call on 10/25/2022

## 2022-10-19 NOTE — PLAN OF CARE
Problem: Adult Inpatient Plan of Care  Goal: Plan of Care Review  Outcome: Ongoing, Progressing     Problem: Infection  Goal: Absence of Infection Signs and Symptoms  Outcome: Ongoing, Progressing     Problem: Fall Injury Risk  Goal: Absence of Fall and Fall-Related Injury  Outcome: Ongoing, Progressing

## 2022-10-19 NOTE — SUBJECTIVE & OBJECTIVE
Interval History: AFVSS. Received vanc and gent this morning. Adequate UOP. Ambulated. Tolerating diet.     Objective:     Temp:  [97 °F (36.1 °C)-99.2 °F (37.3 °C)] 99.2 °F (37.3 °C)  Pulse:  [] 95  Resp:  [14-22] 18  SpO2:  [95 %-100 %] 97 %  BP: (136-171)/() 148/80     Body mass index is 32.55 kg/m².           Drains       Drain  Duration                  Urethral Catheter 10/18/22 0814 Non-latex;Straight-tip 16 Fr. <1 day                    Physical Exam  Vitals reviewed.   Constitutional:       General: He is not in acute distress.     Appearance: He is well-developed.   HENT:      Head: Normocephalic and atraumatic.   Cardiovascular:      Rate and Rhythm: Normal rate.   Pulmonary:      Effort: Pulmonary effort is normal. No respiratory distress.      Breath sounds: No stridor.   Abdominal:      General: There is no distension.      Palpations: Abdomen is soft.      Tenderness: There is no abdominal tenderness.   Genitourinary:     Comments: Penoscrotal incision CDI. Castaneda draining clear yellow  Musculoskeletal:         General: Normal range of motion.      Cervical back: Normal range of motion.   Skin:     General: Skin is warm and dry.   Neurological:      Mental Status: He is alert.   Psychiatric:         Behavior: Behavior normal.       Significant Labs:    BMP:  Recent Labs   Lab 10/12/22  1127      K 4.4      CO2 29   BUN 9   CREATININE 1.0   CALCIUM 9.7       CBC:   Recent Labs   Lab 10/12/22  1127   WBC 5.04   HGB 15.5   HCT 47.7          All pertinent labs results from the past 24 hours have been reviewed.    Significant Imaging:  All pertinent imaging results/findings from the past 24 hours have been reviewed.

## 2022-10-19 NOTE — PLAN OF CARE
APPOINTMENT:    Patient Appointment scheduled with Prashanth Torres MD on Tuesday Oct 25, 2022 10:00 AM

## 2022-10-19 NOTE — PLAN OF CARE
[facility] - [facility]  Discharge Assessment    Primary Care Provider: Prashanth Torres MD     Discharge Assessment (most recent)       BRIEF DISCHARGE ASSESSMENT - 10/19/22 0952          Discharge Planning    Assessment Type Discharge Planning Brief Assessment     Resource/Environmental Concerns none     Support Systems Family members     Equipment Currently Used at Home none     Current Living Arrangements home/apartment/condo     Patient/Family Anticipates Transition to home     Patient/Family Anticipated Services at Transition none     DME Needed Upon Discharge  none     Discharge Plan A Home     Discharge Plan B Home                     Patient discharging home once medications have been delivered to bedside. Plans to take a lyft home. No d/c needs noted.

## 2022-10-21 LAB — BACTERIA SPEC AEROBE CULT: NO GROWTH

## 2022-10-24 ENCOUNTER — OFFICE VISIT (OUTPATIENT)
Dept: UROLOGY | Facility: CLINIC | Age: 57
End: 2022-10-24
Payer: COMMERCIAL

## 2022-10-24 ENCOUNTER — TELEPHONE (OUTPATIENT)
Dept: UROLOGY | Facility: CLINIC | Age: 57
End: 2022-10-24
Payer: COMMERCIAL

## 2022-10-24 VITALS
BODY MASS INDEX: 32.39 KG/M2 | WEIGHT: 238.81 LBS | DIASTOLIC BLOOD PRESSURE: 90 MMHG | SYSTOLIC BLOOD PRESSURE: 144 MMHG | HEART RATE: 76 BPM

## 2022-10-24 DIAGNOSIS — Z98.890 POST-OPERATIVE STATE: Primary | ICD-10-CM

## 2022-10-24 DIAGNOSIS — Z96.0 S/P INSERTION OF PENILE IMPLANT: ICD-10-CM

## 2022-10-24 PROCEDURE — 99024 POSTOP FOLLOW-UP VISIT: CPT | Mod: S$GLB,,, | Performed by: NURSE PRACTITIONER

## 2022-10-24 PROCEDURE — 99999 PR PBB SHADOW E&M-EST. PATIENT-LVL III: CPT | Mod: PBBFAC,,, | Performed by: NURSE PRACTITIONER

## 2022-10-24 PROCEDURE — 99999 PR PBB SHADOW E&M-EST. PATIENT-LVL III: ICD-10-PCS | Mod: PBBFAC,,, | Performed by: NURSE PRACTITIONER

## 2022-10-24 PROCEDURE — 99024 PR POST-OP FOLLOW-UP VISIT: ICD-10-PCS | Mod: S$GLB,,, | Performed by: NURSE PRACTITIONER

## 2022-10-24 NOTE — TELEPHONE ENCOUNTER
Call placed to patient. Name and date of birth verified. Patient stated having severe pain following both surgeries last weak. Patient denies any fever or chills at this time. Appointment scheduled and noted in epic. Patient informed appointment details are noted in patient portal. Patient verbalized understanding.

## 2022-10-24 NOTE — TELEPHONE ENCOUNTER
----- Message from Kayla Cisneros sent at 10/24/2022  9:12 AM CDT -----  Contact: BALAJI SOTO [[id number]] [phone]  Type:  Needs Medical Advice    Who Called: BALAJI SOTO [[id number]]  Symptoms (please be specific): Sharp pain in testicular area when bending over  How long has patient had these symptoms: Ever since procedure 10/18/22  Pharm[facility] and phone #: [id number]  [place] - [address] AT [facility], [phone]  Would the patient rather a call back or a response via [facility]? Phone call   Best Call Back Number: [phone]

## 2022-10-24 NOTE — PROGRESS NOTES
Chapo Ohara is a 57 y.o. male here today for Post Op Check.     10/18/2022 Procedures with Dr. El and Dr. Castaneda:    1.  Inflatable Penile Prosthesis 3 piece device (, [employer])  2.  Plastics operation of the penis to correct angulation via penile modeling  3.  Male Urethral Sling (Virtue)  4.  Cystourethroscopy    He is here today due to concern with left testicle pain; especially when he bends over. Seems left testicle shifted;   States basically has had a good recovery.  No fever, n/v.   Swelling and bruising has improved. Incisions not an issue for him.     I examined him.   NAD, scrotal incision as well as incisions to lower abdomen all healing nicely.  No indication of infection.  Scrotal components palpable and nontender;   Testes palpable and non tender.   Reassurance given  Discussed the contributory factors for his concerns.   Voiced understanding  Continue his post op instructions  F/u in recall

## 2022-10-25 ENCOUNTER — TELEPHONE (OUTPATIENT)
Dept: UROLOGY | Facility: CLINIC | Age: 57
End: 2022-10-25
Payer: COMMERCIAL

## 2022-10-25 LAB — BACTERIA SPEC ANAEROBE CULT: NORMAL

## 2022-10-25 NOTE — TELEPHONE ENCOUNTER
Call placed to patient. Name and date of birth verified. Patient stated Jennifer Rice NP stated he would put in a Rx for a medication. Patient states he can not remember what the name of the medication is or what it was for. Patient informed no new medications have been order, as noted in his chart. Patient asked is there a medication he would like to request from provider. Patient stated no. Patient denies any fever chills and discomforts at this time. Patient instructed to contact our office for any addition questions or concerns. Patient verbalized understanding.

## 2022-10-25 NOTE — TELEPHONE ENCOUNTER
----- Message from John Rocha LPN sent at 10/24/2022  4:21 PM CDT -----    ----- Message -----  From: Jet Covarrubias  Sent: 10/24/2022  12:49 PM CDT  To: Dwayne DANG Staff    Type: Patient Call Back    Who called:Self    What is the request in detail: Pt is calling to check status of medication that was to be called in. Please call    Can the clinic reply by [facility]? no    Would the patient rather a call back or a response via [facility]? Call back    Best call back number [phone] (home)       Additional Information:

## 2022-11-14 ENCOUNTER — OFFICE VISIT (OUTPATIENT)
Dept: UROLOGY | Facility: CLINIC | Age: 57
End: 2022-11-14
Payer: COMMERCIAL

## 2022-11-14 VITALS
SYSTOLIC BLOOD PRESSURE: 153 MMHG | HEART RATE: 61 BPM | HEIGHT: 72 IN | BODY MASS INDEX: 32.34 KG/M2 | DIASTOLIC BLOOD PRESSURE: 95 MMHG | WEIGHT: 238.75 LBS

## 2022-11-14 DIAGNOSIS — N52.31 ERECTILE DYSFUNCTION AFTER RADICAL PROSTATECTOMY: Primary | ICD-10-CM

## 2022-11-14 PROCEDURE — 99999 PR PBB SHADOW E&M-EST. PATIENT-LVL III: CPT | Mod: PBBFAC,,, | Performed by: UROLOGY

## 2022-11-14 PROCEDURE — 99024 POSTOP FOLLOW-UP VISIT: CPT | Mod: S$GLB,,, | Performed by: UROLOGY

## 2022-11-14 PROCEDURE — 99999 PR PBB SHADOW E&M-EST. PATIENT-LVL III: ICD-10-PCS | Mod: PBBFAC,,, | Performed by: UROLOGY

## 2022-11-14 PROCEDURE — 99024 PR POST-OP FOLLOW-UP VISIT: ICD-10-PCS | Mod: S$GLB,,, | Performed by: UROLOGY

## 2022-11-14 NOTE — PROGRESS NOTES
Mr. Ohara is a 57 y.o. male presenting with ED.    PRESENTING ILLNESS:    Chapo Ohara is a 57 y.o. male with prostate cancer s/p RALP on 1/20/21by Dr. Hammond.  Since then, he c/o severe ED.  He's s/p placement of a 3 piece AMS IPP with penile modeling and male sling on 10/18/22.      REVIEW OF SYSTEMS:    Chapo Ohara denies headache, blurred vision, fever, nausea, vomiting, chills, abdominal pain, chest pain, sore throat, bleeding per rectum, cough, SOB, recent loss of consciousness, recent mental status changes, seizures, dizziness, or upper or lower extremity weakness.    JUAN ANTONIO  1. 1  2. 0  3. 0  4. 0  5. 0       PATIENT HISTORY:    Past Medical History:   Diagnosis Date    History of colonic polyps 4/11/2017    HTN (hypertension) 3/7/2014    Hyperlipidemia 03/07/2014    patient states he does not take medication    Prostate cancer     Sleep apnea 9/10/2012    patient does not have sleep apnea       Family History   Problem Relation Age of Onset    No Known Problems Mother     Hypertension Father        Past Surgical History:   Procedure Laterality Date    COLONOSCOPY N/A 12/11/2015    Procedure: COLONOSCOPY;  Surgeon: Balta Farah MD;  Location: [facility];  Service: Endoscopy;  Laterality: N/A;    COLONOSCOPY N/A 3/10/2022    Procedure: COLONOSCOPY;  Surgeon: Natalia Mcqueen MD;  Location: [facility];  Service: Endoscopy;  Laterality: N/A;  [facility]    INSERTION OF SLING FOR MALE URINARY INCONTINENCE N/A 10/18/2022    Procedure: INSERTION, BLADDER SLING, MALE, FOR URINARY INCONTINENCE;  Surgeon: Jason Castaneda MD;  Location: [facility] OR [address] [place] [address];  Service: Urology;  Laterality: N/A;    PENILE PROSTHESIS IMPLANT N/A 10/18/2022    Procedure: INSERTION, PENILE PROSTHESIS;  Surgeon: Td El MD;  Location: [facility] OR [address] [place] [address];  Service: Urology;  Laterality: N/A;  1.5  hours    PROSTATE BIOPSY      ROBOT-ASSISTED LAPAROSCOPIC PELVIC LYMPHADENECTOMY USING DA  ERNIE XI Bilateral 1/20/2021    Procedure: XI ROBOTIC LYMPHADENECTOMY, PELVIC;  Surgeon: BOOGIE Hammond MD;  Location: [facility] OR;  Service: Urology;  Laterality: Bilateral;    ROBOT-ASSISTED LAPAROSCOPIC PROSTATECTOMY USING DA ERNIE XI N/A 1/20/2021    Procedure: XI ROBOTIC PROSTATECTOMY;  Surgeon: BOOGIE Hammond MD;  Location: [facility] OR;  Service: Urology;  Laterality: N/A;    trus/bx 2020      TUMOR REMOVAL      back of head       Social History     Socioeconomic History    Marital status:    Occupational History     Employer: [employer]   Tobacco Use    Smoking status: Never    Smokeless tobacco: Never   Substance and Sexual Activity    Alcohol use: No    Drug use: No    Sexual activity: Yes     Partners: Female       Review of patient's allergies indicates:  No Known Allergies      Current Outpatient Medications:     amLODIPine (NORVASC) 5 MG tablet, Take 5 mg by mouth every evening., Disp: , Rfl:     ketoconazole (NIZORAL) 2 % shampoo, Apply topically twice a week., Disp: 500 mL, Rfl: 12    ketoconazole 2 % Foam, , Disp: , Rfl:     oxyCODONE-acetaminophen (PERCOCET) 5-325 mg per tablet, Take 1 tablet by mouth every 4 (four) hours as needed for Pain., Disp: 10 each, Rfl: 0    tadalafiL (CIALIS) 20 MG Tab, Take 1 tablet (20 mg total) by mouth daily as needed (erectile dysfunction)., Disp: 10 tablet, Rfl: 11    traZODone (DESYREL) 50 MG tablet, TAKE 1 TABLET(50 MG) BY MOUTH EVERY NIGHT AS NEEDED FOR INSOMNIA, Disp: 90 tablet, Rfl: 3    Current Facility-Administered Medications:     alprostadiL (PROSTIN VR) 50 mcg, phentolamine (REGITINE) 1 mg, papaverine 0.03 mg / 5 mL, 0.1 mL, Intracavernosal, 1 time in Clinic/HOD, Dale James MD    PHENYLephrine injection 800 mcg, 800 mcg, Intracavernosal, Once, Dale James MD      PHYSICAL EXAMINATION:    The patient generally appears in good health, is appropriately interactive, and is in no apparent distress.     Eyes: anicteric sclerae, moist  conjunctivae; no lid-lag; PERRLA     HENT: Atraumatic; oropharynx clear with moist mucous membranes and no mucosal ulcerations;normal hard and soft palate. No evidence of lymphadenopathy.    Neck: Trachea midline.  No thyromegaly.    Musculoskeletal: No abnormal gait.    Skin: No lesions.    Mental: Cooperative with normal affect.  Is oriented to time, place, and person.    Neuro: Grossly intact.    Chest: Normal inspiratory effort.   No accessory muscles.  No audible wheezes.  Respirations symmetric on inspiration and expiration.    Heart: Regular rhythm.      Abdomen:  Soft, non-tender. No masses or organomegaly. Bladder is not palpable. No evidence of flank discomfort. No evidence of inguinal hernia.    Genitourinary: The penis is not circumcised with a plaque c/w peyronie's on the shaft. The urethral meatus is normal. The testes, epididymides, and cord structures are normal in size and contour bilaterally. The scrotum is normal in size and contour.  The IPP components are palpable in the penis and scrotum.    Extremities: No clubbing, cyanosis, or edema        LABS:    UA dipped negative today  Lab Results   Component Value Date    PSA 5.1 (H) 06/26/2020    PSA 3.9 04/11/2018    PSA 3.1 04/11/2017    PSADIAG 0.05 08/02/2022    PSADIAG 0.05 05/04/2022    PSADIAG <0.01 12/14/2021        IMPRESSION:    Encounter Diagnosis   Name Primary?    Erectile dysfunction after radical prostatectomy Yes       PLAN:    1. RTC 4 weeks with an MANASA to inflate/deflate.  2. RTC 3 months.      Copy to:

## 2022-11-16 ENCOUNTER — TELEPHONE (OUTPATIENT)
Dept: UROLOGY | Facility: CLINIC | Age: 57
End: 2022-11-16
Payer: COMMERCIAL

## 2022-11-16 DIAGNOSIS — N39.3 SUI (STRESS URINARY INCONTINENCE), MALE: Primary | ICD-10-CM

## 2022-11-22 LAB — FUNGUS SPEC CULT: NORMAL

## 2022-11-23 ENCOUNTER — TELEPHONE (OUTPATIENT)
Dept: UROLOGY | Facility: CLINIC | Age: 57
End: 2022-11-23
Payer: COMMERCIAL

## 2022-11-28 ENCOUNTER — HOSPITAL ENCOUNTER (OUTPATIENT)
Facility: HOSPITAL | Age: 57
Discharge: HOME | End: 2022-11-28
Attending: UROLOGY | Admitting: UROLOGY
Payer: COMMERCIAL

## 2022-11-28 VITALS
DIASTOLIC BLOOD PRESSURE: 97 MMHG | OXYGEN SATURATION: 99 % | TEMPERATURE: 98 F | RESPIRATION RATE: 18 BRPM | SYSTOLIC BLOOD PRESSURE: 154 MMHG | HEART RATE: 58 BPM

## 2022-11-28 DIAGNOSIS — N32.9 BLADDER DISORDER: ICD-10-CM

## 2022-11-28 PROCEDURE — 51784 PR ANAL/URINARY MUSCLE STUDY: ICD-10-PCS | Mod: 26,51,, | Performed by: UROLOGY

## 2022-11-28 PROCEDURE — 51600 INJECTION FOR BLADDER X-RAY: CPT | Mod: 51,79,, | Performed by: UROLOGY

## 2022-11-28 PROCEDURE — 51741 PR UROFLOWMETRY, COMPLEX: ICD-10-PCS | Mod: 26,51,, | Performed by: UROLOGY

## 2022-11-28 PROCEDURE — 27200970 HC CYSTO SUPPLY I (SIMPLE): Performed by: UROLOGY

## 2022-11-28 PROCEDURE — 51797 PR VOIDING PRESS STUDY INTRA-ABDOMINAL VOID: ICD-10-PCS | Mod: 26,,, | Performed by: UROLOGY

## 2022-11-28 PROCEDURE — 52000 PR CYSTOURETHROSCOPY: ICD-10-PCS | Mod: 59,79,, | Performed by: UROLOGY

## 2022-11-28 PROCEDURE — 51728 CYSTOMETROGRAM W/VP: CPT | Mod: 26,79,, | Performed by: UROLOGY

## 2022-11-28 PROCEDURE — 51784 ANAL/URINARY MUSCLE STUDY: CPT | Mod: 26,51,, | Performed by: UROLOGY

## 2022-11-28 PROCEDURE — 74455 PR X-RAY URETHROCYSTOGRAM+VOIDING: ICD-10-PCS | Mod: 26,,, | Performed by: UROLOGY

## 2022-11-28 PROCEDURE — 52000 CYSTOURETHROSCOPY: CPT | Mod: 59,79,, | Performed by: UROLOGY

## 2022-11-28 PROCEDURE — 74455 X-RAY URETHRA/BLADDER: CPT | Mod: 26,,, | Performed by: UROLOGY

## 2022-11-28 PROCEDURE — 51797 INTRAABDOMINAL PRESSURE TEST: CPT | Mod: 26,,, | Performed by: UROLOGY

## 2022-11-28 PROCEDURE — 51741 ELECTRO-UROFLOWMETRY FIRST: CPT | Mod: 26,51,, | Performed by: UROLOGY

## 2022-11-28 PROCEDURE — 51600 PR INJECTION FOR BLADDER X-RAY: ICD-10-PCS | Mod: 51,79,, | Performed by: UROLOGY

## 2022-11-28 PROCEDURE — 36000706: Performed by: UROLOGY

## 2022-11-28 PROCEDURE — 36000707: Performed by: UROLOGY

## 2022-11-28 PROCEDURE — 27200973 HC CYSTO SUPPLY IV (URODYNAMICS): Performed by: UROLOGY

## 2022-11-28 PROCEDURE — 51728 PR COMPLEX CYSTOMETROGRAM VOIDING PRESSURE STUDIES: ICD-10-PCS | Mod: 26,79,, | Performed by: UROLOGY

## 2022-11-28 NOTE — INTERVAL H&P NOTE
The patient has been examined and the H&P has been reviewed:    Continues with stress incontinence following male sling.

## 2022-11-28 NOTE — OP NOTE
Urodynamic Report for Post-Prostatectomy Incontinence    [facility] Urology       Referring Physician:  Jason Castaneda MD    YOB: 1965  Date of Exam: 11/28/2022    HPI: This is a very pleasant 57 y.o. male seen today for urodynamic evaluation of persistent CORY following Male Sling (Virtue) 2 months ago. He reports no voiding dysfunction suggestive of POE. His incontinence is pretty clearly CORY rather than UUI.        Cystometrogram:    Position: Sitting  Filling Rate: 30 mL/sec   Catheter: 7F  Fluid:Conray       Cath PVR prior: 40 mL Voiding Diary Capacity: Not Available   First Sensation: 130 mL First Desire: 130 mL   Strong Desire: 256 mL Cystometric Capacity: 303 mL       Pdet at care home: 6 cm H2O Compliance: Normal       Detrusor Overactivity (DO): Absent  Volume first DO: No DO   Max DO Pressure: No DO Urgency Incontinence: Absent        Leak Point Pressure Testing:        Abdominal Leak Point Pressure (150mL):  74 cmH2O  Abdominal Leak Point Pressure (250 mL):  76 cmH2O   Abdominal Leak Point Pressure (Cap):  76 cmH2O Stress Induced DO: Absent         Voiding Study:        Voided Volume: 303 mL PVR: 0 mL   Maximum Flow: 16 mL/sec Average Flow 9 mL/sec   Max Pdet: P1 pulled  Pdet at Max Flow: P1 pulled   EMG Storage: Normal Recruitment  EMG Voiding: Normal quiescence       Fluroscopic Imaging:        Bladder Contour: Smooth Vesicoureteral Reflux:No VUR   Bladder Neck at Rest: closed Bladder Neck Voiding:Patent       Cystoscopy Findings:        Urethral Stricture No Vesicourethral Anastamotic Stenosis No       Impression:        Sensation:Normal    Capacity: Normal    Compliance:Normal    Detrusor Overactivity:Absent    Continence: CORY (no ISD)    Contractility: Normal    Emptying:Satisfactory    Coordination:Coordinated Voiding          Summary:  This study was performed in accordance with the current AUA/SUFU Guideline on Adult Urodynamics. On filling phase he demonstrates normal  first and strong desire with a normal bladder capacity. There is no detrusor overactivity (DO) demonstrated on this exam (though absence of DO on urodynamic evaluation does not exclude it as causative agent of urgency symptoms). Bladder compliance at capacity is normal. On fluoroscopy, the bladder contour is smooth. There is no VUR demonstrated on this exam.     On stress testing, with adequate cough and valsalva effort, there is CORY demonstrated at pressures above those traditionally associated with intrinsic sphincter deficiency (ISD). Of note, his ALPP was essentially unchanged compared to prior study (66 cm H2O).     On voiding phase, he was unable to void until the urethral catheters were removed, but had a normal flow rate and PVR with that and reports no voiding difficulty normally.     Cystoscopy Details: Informed consent was obtained and he was sterily prepped and 1% lidocaine jelly was injected per urethra. A flexible cystoscope was inserted into the bladder via the urethra. There was no evidence of stricture, stenosis, lesions, other obstruction, or diverticulum. Significant findings included a normal unobstructed urethra only. Cystoscopic examination of the bladder revealed orthotopically positioned, normal bilateral ureteral orifices with clear yellow urine effluxing from each orifice. All mucosal surfaces were examined with no apparent stones, tumors, foreign bodies, erythema, trabeculation, diverticula, or ulcers. The procedure was concluded without complications. The patient was not administered a post-procedure antibiotic. No evidence of sling perforation.     We discussed options as before including transurethral bulking, AUS and ProACT. We discussed the risks and benefits of each, including lack of significant experience with ProACT, though this is FDA-approved in the [place] and there is substantial data from abroad.

## 2022-12-13 ENCOUNTER — OFFICE VISIT (OUTPATIENT)
Dept: UROLOGY | Facility: CLINIC | Age: 57
End: 2022-12-13
Payer: COMMERCIAL

## 2022-12-13 VITALS
DIASTOLIC BLOOD PRESSURE: 100 MMHG | HEART RATE: 60 BPM | WEIGHT: 240 LBS | BODY MASS INDEX: 32.51 KG/M2 | HEIGHT: 72 IN | SYSTOLIC BLOOD PRESSURE: 155 MMHG

## 2022-12-13 DIAGNOSIS — Z98.890 POST-OPERATIVE STATE: Primary | ICD-10-CM

## 2022-12-13 DIAGNOSIS — Z96.0 S/P INSERTION OF PENILE IMPLANT: ICD-10-CM

## 2022-12-13 PROCEDURE — 99999 PR PBB SHADOW E&M-EST. PATIENT-LVL III: CPT | Mod: PBBFAC,,, | Performed by: NURSE PRACTITIONER

## 2022-12-13 PROCEDURE — 99024 POSTOP FOLLOW-UP VISIT: CPT | Mod: S$GLB,,, | Performed by: NURSE PRACTITIONER

## 2022-12-13 PROCEDURE — 99999 PR PBB SHADOW E&M-EST. PATIENT-LVL III: ICD-10-PCS | Mod: PBBFAC,,, | Performed by: NURSE PRACTITIONER

## 2022-12-13 PROCEDURE — 99024 PR POST-OP FOLLOW-UP VISIT: ICD-10-PCS | Mod: S$GLB,,, | Performed by: NURSE PRACTITIONER

## 2022-12-13 NOTE — PROGRESS NOTES
Chapo Ohara is a 57 y.o. male with prostate cancer s/p RALP on 1/20/21by Dr. Hammond.  Since then, he c/o severe ED.    He's s/p placement of a 3 piece AMS IPP with penile modeling and male sling on 10/18/22.     Last office visit was 11/14/2022    Here today for initial cycling.   Overall doing well  Scrotal incision healed.     We discussed his IPP and the expectations.   Emphasized the importance of cycling daily at very least and why it is important to do so.  This will make it a lot easier to use and enjoy.  Discussed that he can never over inflate and cycle too many times.  He was able to easily identify the IPP components.  I was able to easily cycle his IPP.  He then cycled it 2x without a lot of difficulty.  We discussed different tips on how to cycle device.  He can take OTC Tylenol or motrin (if allowed) for any initial discomfort until gets use to IPP.  Again discussed importance of cycling daily.  RTC any problems with cycling.  F/u with Dr. Sienna damon.  Voiced understanding

## 2023-02-13 ENCOUNTER — OFFICE VISIT (OUTPATIENT)
Dept: UROLOGY | Facility: CLINIC | Age: 58
End: 2023-02-13
Payer: COMMERCIAL

## 2023-02-13 VITALS
SYSTOLIC BLOOD PRESSURE: 148 MMHG | BODY MASS INDEX: 33.74 KG/M2 | DIASTOLIC BLOOD PRESSURE: 100 MMHG | WEIGHT: 249.13 LBS | HEART RATE: 65 BPM | HEIGHT: 72 IN

## 2023-02-13 DIAGNOSIS — N48.6 PEYRONIE'S DISEASE: ICD-10-CM

## 2023-02-13 DIAGNOSIS — E78.49 OTHER HYPERLIPIDEMIA: ICD-10-CM

## 2023-02-13 DIAGNOSIS — C61 PROSTATE CANCER: ICD-10-CM

## 2023-02-13 DIAGNOSIS — N52.31 ERECTILE DYSFUNCTION AFTER RADICAL PROSTATECTOMY: Primary | ICD-10-CM

## 2023-02-13 DIAGNOSIS — I10 PRIMARY HYPERTENSION: ICD-10-CM

## 2023-02-13 PROCEDURE — 99214 PR OFFICE/OUTPT VISIT, EST, LEVL IV, 30-39 MIN: ICD-10-PCS | Mod: S$GLB,,, | Performed by: UROLOGY

## 2023-02-13 PROCEDURE — 99214 OFFICE O/P EST MOD 30 MIN: CPT | Mod: S$GLB,,, | Performed by: UROLOGY

## 2023-02-13 PROCEDURE — 99999 PR PBB SHADOW E&M-EST. PATIENT-LVL III: CPT | Mod: PBBFAC,,, | Performed by: UROLOGY

## 2023-02-13 PROCEDURE — 99999 PR PBB SHADOW E&M-EST. PATIENT-LVL III: ICD-10-PCS | Mod: PBBFAC,,, | Performed by: UROLOGY

## 2023-02-13 RX ORDER — TADALAFIL 20 MG/1
20 TABLET ORAL DAILY
Qty: 10 TABLET | Refills: 11 | Status: SHIPPED | OUTPATIENT
Start: 2023-02-13

## 2023-02-13 NOTE — PROGRESS NOTES
Mr. Ohara is a 57 y.o. male presenting with ED.    PRESENTING ILLNESS:    Chapo Ohara is a 57 y.o. male with prostate cancer s/p RALP on 1/20/21by Dr. Hammond.  Since then, he c/o severe ED.  He's s/p placement of a 3 piece AMS IPP with penile modeling and male sling on 10/18/22. He states that he is having some trouble penetrating because his glans does not get hard.      REVIEW OF SYSTEMS:    Chapo Ohara denies headache, blurred vision, fever, nausea, vomiting, chills, abdominal pain, chest pain, sore throat, bleeding per rectum, cough, SOB, recent loss of consciousness, recent mental status changes, seizures, dizziness, or upper or lower extremity weakness.    JUAN ANTONIO  1. 5  2. 2  3. 5  4. 2  5. 2       PATIENT HISTORY:    Past Medical History:   Diagnosis Date    History of colonic polyps 04/11/2017    HTN (hypertension) 03/07/2014    Hyperlipidemia 03/07/2014    patient states he does not take medication    Prostate cancer        Family History   Problem Relation Age of Onset    No Known Problems Mother     Hypertension Father        Past Surgical History:   Procedure Laterality Date    COLONOSCOPY N/A 12/11/2015    Procedure: COLONOSCOPY;  Surgeon: Balta Farah MD;  [facility];  Service: Endoscopy;  Laterality: N/A;    COLONOSCOPY N/A 3/10/2022    Procedure: COLONOSCOPY;  Surgeon: Natalia Mcqueen MD;  Location: [facility] ENDO;  Service: Endoscopy;  Laterality: N/A;  [facility] to [facility]st    CYSTOSCOPY WITH URODYNAMIC TESTING N/A 11/28/2022    Procedure: CYSTOSCOPY, WITH URODYNAMIC TESTING;  Surgeon: Jason Castaneda MD;  Location: [facility] OR 1ST FLR;  Service: Urology;  Laterality: N/A;  1hr    INSERTION OF SLING FOR MALE URINARY INCONTINENCE N/A 10/18/2022    Procedure: INSERTION, BLADDER SLING, MALE, FOR URINARY INCONTINENCE;  Surgeon: Jason Castaneda MD;  Location: [facility] OR 2ND FLR;  Service: Urology;  Laterality: N/A;    PENILE PROSTHESIS IMPLANT N/A  10/18/2022    Procedure: INSERTION, PENILE PROSTHESIS;  Surgeon: Td El MD;  Location: [facility] OR [address] [place] [address];  Service: Urology;  Laterality: N/A;  1.5  hours    PROSTATE BIOPSY      ROBOT-ASSISTED LAPAROSCOPIC PELVIC LYMPHADENECTOMY USING DA ERNIE XI Bilateral 1/20/2021    Procedure: XI ROBOTIC LYMPHADENECTOMY, PELVIC;  Surgeon: BOOGIE Hammond MD;  Location: [facility];  Service: Urology;  Laterality: Bilateral;    ROBOT-ASSISTED LAPAROSCOPIC PROSTATECTOMY USING DA ERNIE XI N/A 1/20/2021    Procedure: XI ROBOTIC PROSTATECTOMY;  Surgeon: BOOGIE Hammond MD;  Location: [facility];  Service: Urology;  Laterality: N/A;    trus/bx 2020      TUMOR REMOVAL      back of head       Social History     Socioeconomic History    Marital status:    Occupational History     Employer: [employer]   Tobacco Use    Smoking status: Never    Smokeless tobacco: Never   Substance and Sexual Activity    Alcohol use: No    Drug use: No    Sexual activity: Yes     Partners: Female       Review of patient's allergies indicates:  No Known Allergies      Current Outpatient Medications:     amLODIPine (NORVASC) 5 MG tablet, Take 5 mg by mouth every evening., Disp: , Rfl:     ketoconazole (NIZORAL) 2 % shampoo, Apply topically twice a week., Disp: 500 mL, Rfl: 12    traZODone (DESYREL) 50 MG tablet, TAKE 1 TABLET(50 MG) BY MOUTH EVERY NIGHT AS NEEDED FOR INSOMNIA, Disp: 90 tablet, Rfl: 3    Current Facility-Administered Medications:     alprostadiL (PROSTIN VR) 50 mcg, phentolamine (REGITINE) 1 mg, papaverine 0.03 mg / 5 mL, 0.1 mL, Intracavernosal, 1 time in Clinic/HOD, Dale James MD    PHENYLephrine injection 800 mcg, 800 mcg, Intracavernosal, Once, Dale James MD      PHYSICAL EXAMINATION:    The patient generally appears in good health, is appropriately interactive, and is in no apparent distress.     Eyes: anicteric sclerae, moist conjunctivae; no lid-lag; PERRLA     HENT: Atraumatic; oropharynx clear with moist  mucous membranes and no mucosal ulcerations;normal hard and soft palate. No evidence of lymphadenopathy.    Neck: Trachea midline.  No thyromegaly.    Musculoskeletal: No abnormal gait.    Skin: No lesions.    Mental: Cooperative with normal affect.  Is oriented to time, place, and person.    Neuro: Grossly intact.    Chest: Normal inspiratory effort.   No accessory muscles.  No audible wheezes.  Respirations symmetric on inspiration and expiration.    Heart: Regular rhythm.      Abdomen:  Soft, non-tender. No masses or organomegaly. Bladder is not palpable. No evidence of flank discomfort. No evidence of inguinal hernia.    Genitourinary: The penis is not circumcised with a plaque c/w peyronie's on the shaft. The urethral meatus is normal. The testes, epididymides, and cord structures are normal in size and contour bilaterally. The scrotum is normal in size and contour.  The IPP components are palpable in the penis and scrotum.    Extremities: No clubbing, cyanosis, or edema        LABS:    UA dipped negative today  Lab Results   Component Value Date    PSA 5.1 (H) 06/26/2020    PSA 3.9 04/11/2018    PSA 3.1 04/11/2017    PSADIAG 0.05 08/02/2022    PSADIAG 0.05 05/04/2022    PSADIAG <0.01 12/14/2021        IMPRESSION:    Encounter Diagnoses   Name Primary?    Erectile dysfunction after radical prostatectomy Yes    Prostate cancer     Peyronie's disease     Other hyperlipidemia     Primary hypertension    HTN, stable  Hyperlipidemia, stable    PLAN:    1. Discussed options for his severe ED (affected by above comorbidities).  Can try Cialis and OTC lubrication to help the glans penetrate easier.  Side effects discussed.  A new Rx was given   2.  Continue to see Dr. Hammond for the PCa.  3. RTC prn.      Copy to:

## 2023-02-24 ENCOUNTER — TELEPHONE (OUTPATIENT)
Dept: UROLOGY | Facility: CLINIC | Age: 58
End: 2023-02-24
Payer: COMMERCIAL

## 2023-02-24 ENCOUNTER — PATIENT MESSAGE (OUTPATIENT)
Dept: UROLOGY | Facility: CLINIC | Age: 58
End: 2023-02-24
Payer: COMMERCIAL

## 2023-02-24 NOTE — TELEPHONE ENCOUNTER
Call placed to patient. No answer. Message left to return call. Call back number provided. Will continue to follow-up.   Portal massage sent.      (Patient has active Rx at [facility])

## 2023-02-24 NOTE — TELEPHONE ENCOUNTER
----- Message from Paola Gruber sent at 2/24/2023 10:27 AM CST -----  .Type: RX Refill Request    Who Called: Self    Have you contacted your pharmacy: No    Refill or New Rx: Refill    RX Name and Strength:tadalafiL (CIALIS) 20 MG Tab    Preferred Pharmacy with phone number:.  [id number] - [place], [place]  [address] [facility] [address] [place] [id number]  [place] [address]  Phone: [phone] Fax: [phone]        Local or Mail Order: Local    Ordering Provider: Sienna    Would the patient rather a call back or a response via [facility]? Call    Best Call Back Number:[phone] (home)       Additional Information:    Medication was sent to wrong pharmacy

## 2023-03-01 ENCOUNTER — TELEPHONE (OUTPATIENT)
Dept: UROLOGY | Facility: CLINIC | Age: 58
End: 2023-03-01
Payer: COMMERCIAL

## 2023-03-01 NOTE — TELEPHONE ENCOUNTER
Call placed to patient. Name and date of birth verified. Patient informed his initial Rx was sent to [facility]. Patient instructed to contact [facility] as they will mail medication. Patient verbalized understanding.

## 2023-03-01 NOTE — TELEPHONE ENCOUNTER
----- Message from John Rocha LPN sent at 2/28/2023  2:44 PM CST -----  Regarding: FW: Returning call    ----- Message -----  From: Ashley Dubose  Sent: 2/28/2023  10:33 AM CST  To: Sienna ALEJANDRE Staff  Subject: Returning call                                   Pt Is Requesting a Call back from Isabelle Regarding medication    tadalafiL (CIALIS) 20 MG Tab,  and to Send [facility] to [facility]  provided   please teresita to discuss further .        [id number] - [place] - [address] AT [facility]  [address]  [place] [address]  Phone: [phone] Fax: [phone]        [phone]

## 2023-04-19 ENCOUNTER — TELEPHONE (OUTPATIENT)
Dept: FAMILY MEDICINE | Facility: CLINIC | Age: 58
End: 2023-04-19
Payer: COMMERCIAL

## 2023-04-19 ENCOUNTER — OFFICE VISIT (OUTPATIENT)
Dept: FAMILY MEDICINE | Facility: CLINIC | Age: 58
End: 2023-04-19
Payer: COMMERCIAL

## 2023-04-19 VITALS
HEIGHT: 74 IN | TEMPERATURE: 98 F | WEIGHT: 245.13 LBS | BODY MASS INDEX: 31.46 KG/M2 | HEART RATE: 54 BPM | DIASTOLIC BLOOD PRESSURE: 104 MMHG | OXYGEN SATURATION: 99 % | SYSTOLIC BLOOD PRESSURE: 160 MMHG

## 2023-04-19 DIAGNOSIS — M79.672 ARCH PAIN OF LEFT FOOT: ICD-10-CM

## 2023-04-19 DIAGNOSIS — I10 ESSENTIAL HYPERTENSION: Primary | ICD-10-CM

## 2023-04-19 DIAGNOSIS — Z86.010 HISTORY OF COLONIC POLYPS: ICD-10-CM

## 2023-04-19 DIAGNOSIS — N52.31 ERECTILE DYSFUNCTION AFTER RADICAL PROSTATECTOMY: ICD-10-CM

## 2023-04-19 DIAGNOSIS — N39.3 MALE STRESS INCONTINENCE: ICD-10-CM

## 2023-04-19 DIAGNOSIS — E78.5 HYPERLIPIDEMIA, UNSPECIFIED HYPERLIPIDEMIA TYPE: ICD-10-CM

## 2023-04-19 PROCEDURE — 99999 PR PBB SHADOW E&M-EST. PATIENT-LVL IV: ICD-10-PCS | Mod: PBBFAC,,, | Performed by: INTERNAL MEDICINE

## 2023-04-19 PROCEDURE — 99999 PR PBB SHADOW E&M-EST. PATIENT-LVL IV: CPT | Mod: PBBFAC,,, | Performed by: INTERNAL MEDICINE

## 2023-04-19 PROCEDURE — 4010F ACE/ARB THERAPY RXD/TAKEN: CPT | Mod: CPTII,S$GLB,, | Performed by: INTERNAL MEDICINE

## 2023-04-19 PROCEDURE — 99214 PR OFFICE/OUTPT VISIT, EST, LEVL IV, 30-39 MIN: ICD-10-PCS | Mod: S$GLB,,, | Performed by: INTERNAL MEDICINE

## 2023-04-19 PROCEDURE — 4010F PR ACE/ARB THEARPY RXD/TAKEN: ICD-10-PCS | Mod: CPTII,S$GLB,, | Performed by: INTERNAL MEDICINE

## 2023-04-19 PROCEDURE — 3080F PR MOST RECENT DIASTOLIC BLOOD PRESSURE >= 90 MM HG: ICD-10-PCS | Mod: CPTII,S$GLB,, | Performed by: INTERNAL MEDICINE

## 2023-04-19 PROCEDURE — 3080F DIAST BP >= 90 MM HG: CPT | Mod: CPTII,S$GLB,, | Performed by: INTERNAL MEDICINE

## 2023-04-19 PROCEDURE — 3077F SYST BP >= 140 MM HG: CPT | Mod: CPTII,S$GLB,, | Performed by: INTERNAL MEDICINE

## 2023-04-19 PROCEDURE — 1159F MED LIST DOCD IN RCRD: CPT | Mod: CPTII,S$GLB,, | Performed by: INTERNAL MEDICINE

## 2023-04-19 PROCEDURE — 3077F PR MOST RECENT SYSTOLIC BLOOD PRESSURE >= 140 MM HG: ICD-10-PCS | Mod: CPTII,S$GLB,, | Performed by: INTERNAL MEDICINE

## 2023-04-19 PROCEDURE — 3008F BODY MASS INDEX DOCD: CPT | Mod: CPTII,S$GLB,, | Performed by: INTERNAL MEDICINE

## 2023-04-19 PROCEDURE — 1159F PR MEDICATION LIST DOCUMENTED IN MEDICAL RECORD: ICD-10-PCS | Mod: CPTII,S$GLB,, | Performed by: INTERNAL MEDICINE

## 2023-04-19 PROCEDURE — 99214 OFFICE O/P EST MOD 30 MIN: CPT | Mod: S$GLB,,, | Performed by: INTERNAL MEDICINE

## 2023-04-19 PROCEDURE — 3008F PR BODY MASS INDEX (BMI) DOCUMENTED: ICD-10-PCS | Mod: CPTII,S$GLB,, | Performed by: INTERNAL MEDICINE

## 2023-04-19 RX ORDER — VALSARTAN 160 MG/1
160 TABLET ORAL DAILY
Qty: 90 TABLET | Refills: 3 | Status: SHIPPED | OUTPATIENT
Start: 2023-04-19 | End: 2023-07-25 | Stop reason: SDUPTHER

## 2023-04-19 RX ORDER — AMLODIPINE BESYLATE 5 MG/1
5 TABLET ORAL DAILY
Qty: 30 TABLET | Refills: 12 | Status: CANCELLED | OUTPATIENT
Start: 2023-04-19

## 2023-04-19 RX ORDER — AMLODIPINE BESYLATE 5 MG/1
5 TABLET ORAL DAILY
Qty: 90 TABLET | Refills: 12 | Status: SHIPPED | OUTPATIENT
Start: 2023-04-19 | End: 2023-07-25 | Stop reason: SDUPTHER

## 2023-04-19 NOTE — TELEPHONE ENCOUNTER
Spoke with patient states that Urology referral needs to be cancel at these time , Podiatry referral schedule at these time for 04/25/23 @ 11:00 am . Verbalized understand .

## 2023-04-19 NOTE — PROGRESS NOTES
Chief complaint, follow-up on blood pressure, [place], left arch pain, discussed urinary issues      57-year-old black male .  [place] was [place] and brings in a workup that he had done.  His blood pressure was high.  He was out of his amlodipine.  It looks like he was given Micardis 80 mg and he is out of that now having no refills.  It was causing him nausea every time he took it which I explained was unusual for an ARB but we will check.  We discussed how he might need multiple medications for his blood pressure with an average of three.  We will restart the Norvasc 5 mg and start valsartan 160 mg and encouraged him to monitor over 2-3 weeks and report response     He does bring in records from outside the clinic which was some normal lab work.  Looks like he had some urinary flow assessment and they made recommendation that he might need urodynamic studies after having had his sling for stress incontinence.  He would like a referral back to his urologist that did the sling.  He otherwise notes no particular problem.  He had a renal ultrasound outside the clinic which looks like it was unremarkable with no postvoid residual.  He is not having any pain so will need to assess if there is any urinary flow issues.  Looks like they may just made some assessment that he should be checked again.  His stress incontinence has resolved.  He wears a small pad just in case.    Also with some left arch pain for which he would like to see Podiatry.     Patient last seen in consultation from Urology Dr. El.  He has some significant erectile dysfunction not responsive completely to medications and complicated by Peyronie's disease.  He plans to have an implant.  He also has urinary incontinence after his prostatectomy and also plans to have some form of surgical procedure for incontinence       He had 4 uncles with colon cancer.     1 colon polyp 3/22 -5 yrs       We  also discussed his severe hyperlipidemia and the benefits of statin and so will try Crestor and he can look on the good Rx card and if Lipitor is cheaper we will call in accordingly. Not on  statin ???    We discussed his vitamin-D deficiency and he is taking a multivitamin but probably should add 2000 units.    Apparently he has sleep apnea by remote test but never had CPAP.  Apparently when he took the sleep apnea test he had some dental infections and was told by the dentist that might have affected his sleep study.  He only sleeps about 5 hr per day but is not sleepy during the day or as symptoms of sleep apnea.        ROS:   CONST: weight stable. EYES: no vision change. ENT: no sore throat. CV: no chest pain w/ exertion. RESP: no shortness of breath. GI: no nausea, vomiting, diarrhea. No dysphagia. : no urinary issues. MUSCULOSKELETAL: no new myalgias or arthralgias. SKIN: no new changes. NEURO: no focal deficits. PSYCH: no new issues. ENDOCRINE: no polyuria. HEME: no lymph nodes. ALLERGY: no general pruritis.     PMH:   1.  in 2006, 200 in 2014  2. HTN  3. WANDA , not on CPAP  4. 3 Polyps 3/15 --1 colon polyp 3/22 -5 yrs -4 uncles w colon cancer   5.  Anemia, likely secondary to blood donations 4 times a year  6.  Vitamin-D deficiency  7.  Prostate cancer -robotic assisted laparoscopic prostatectomy on 1/20/2021                                                                   PSH: dental/sinus surgery, robotic assisted laparoscopic prostatectomy on 1/20/2021, penile implant 10/22                                                                     SH:  Worked as a  at [employer]. [employer] and [employer] .  Does not smoke or drink.                                                                                         FMH: Father and brother with prostate cancer at age 50.  1 uncle with colon cancer.  Mom with borderline DM.  One sister and four brothers with no problem.    Vitals as above,  exam otherwise deferred, gait is normal, no limp    Diagnoses and all orders for this visit:    Essential hypertension, uncontrolled but off medications, not sure he had nausea as an intolerance to Micardis but noted.  Will start valsartan 160 restart Norvasc five and have him monitor.    Erectile dysfunction after radical prostatectomy, status post implant  -     Ambulatory referral/consult to Urology; Future    History of colonic polyps, repeat 2027    Hyperlipidemia, unspecified hyperlipidemia type, may need treatment    Male stress incontinence, refer back to Urology but not having any problems and I gave him back his copies [place] and he will bring these to Urology to assess if there is any further workup needed  -     Ambulatory referral/consult to Urology; Future    Arch pain of left foot  -     Ambulatory referral/consult to Podiatry; Future    Other orders  -     valsartan (DIOVAN) 160 MG tablet; Take 1 tablet (160 mg total) by mouth once daily.  -     amLODIPine (NORVASC) 5 MG tablet; Take 1 tablet (5 mg total) by mouth once daily.  The following orders have not been finalized:  -     Cancel: amLODIPine (NORVASC) 5 MG tablet

## 2023-04-25 ENCOUNTER — OFFICE VISIT (OUTPATIENT)
Dept: PODIATRY | Facility: CLINIC | Age: 58
End: 2023-04-25
Payer: COMMERCIAL

## 2023-04-25 VITALS — WEIGHT: 245.13 LBS | BODY MASS INDEX: 31.46 KG/M2 | HEIGHT: 74 IN

## 2023-04-25 DIAGNOSIS — M79.672 LEFT FOOT PAIN: ICD-10-CM

## 2023-04-25 DIAGNOSIS — M72.2 PLANTAR FASCIITIS: Primary | ICD-10-CM

## 2023-04-25 PROCEDURE — 3008F BODY MASS INDEX DOCD: CPT | Mod: CPTII,S$GLB,, | Performed by: PODIATRIST

## 2023-04-25 PROCEDURE — 99203 PR OFFICE/OUTPT VISIT, NEW, LEVL III, 30-44 MIN: ICD-10-PCS | Mod: S$GLB,,, | Performed by: PODIATRIST

## 2023-04-25 PROCEDURE — 1159F MED LIST DOCD IN RCRD: CPT | Mod: CPTII,S$GLB,, | Performed by: PODIATRIST

## 2023-04-25 PROCEDURE — 3008F PR BODY MASS INDEX (BMI) DOCUMENTED: ICD-10-PCS | Mod: CPTII,S$GLB,, | Performed by: PODIATRIST

## 2023-04-25 PROCEDURE — 99203 OFFICE O/P NEW LOW 30 MIN: CPT | Mod: S$GLB,,, | Performed by: PODIATRIST

## 2023-04-25 PROCEDURE — 4010F PR ACE/ARB THEARPY RXD/TAKEN: ICD-10-PCS | Mod: CPTII,S$GLB,, | Performed by: PODIATRIST

## 2023-04-25 PROCEDURE — 4010F ACE/ARB THERAPY RXD/TAKEN: CPT | Mod: CPTII,S$GLB,, | Performed by: PODIATRIST

## 2023-04-25 PROCEDURE — 99999 PR PBB SHADOW E&M-EST. PATIENT-LVL III: ICD-10-PCS | Mod: PBBFAC,,, | Performed by: PODIATRIST

## 2023-04-25 PROCEDURE — 1159F PR MEDICATION LIST DOCUMENTED IN MEDICAL RECORD: ICD-10-PCS | Mod: CPTII,S$GLB,, | Performed by: PODIATRIST

## 2023-04-25 PROCEDURE — 99999 PR PBB SHADOW E&M-EST. PATIENT-LVL III: CPT | Mod: PBBFAC,,, | Performed by: PODIATRIST

## 2023-04-25 RX ORDER — DICLOFENAC SODIUM 10 MG/G
2 GEL TOPICAL DAILY
Qty: 100 G | Refills: 3 | Status: SHIPPED | OUTPATIENT
Start: 2023-04-25

## 2023-04-25 RX ORDER — MELOXICAM 15 MG/1
15 TABLET ORAL DAILY
Qty: 20 TABLET | Refills: 0 | Status: SHIPPED | OUTPATIENT
Start: 2023-04-25

## 2023-04-25 NOTE — PROGRESS NOTES
Subjective:     Patient ID: Chapo Ohara is a 57 y.o. male.    Chief Complaint: Foot Pain (Right )    Chapo is a 57 y.o. male who presents to the podiatry clinic  with complaint of  right foot pain. Onset of the symptoms was several weeks ago. Precipitating event: none known. Current symptoms include: ability to bear weight, but with some pain. Aggravating factors: any weight bearing. Symptoms have progressed to a point and plateaued. Patient has had no prior foot problems. Evaluation to date: none. Treatment to date: none. Patients rates pain 4/10 on pain scale.    Review of Systems   Constitutional: Negative for chills.   Cardiovascular:  Negative for chest pain and claudication.   Respiratory:  Negative for cough.    Skin:  Positive for color change, dry skin and nail changes.   Musculoskeletal:  Positive for joint pain.   Gastrointestinal:  Negative for nausea.   Neurological:  Positive for paresthesias. Negative for numbness.   Psychiatric/Behavioral:  The patient is not nervous/anxious.       Objective:     Physical Exam  Constitutional:       Appearance: He is well-developed.      Comments: Oriented to time, place, and person.   Cardiovascular:      Comments: DP and PT pulses are palpable bilaterally. 3 sec capillary refill time and toes and feet are warm to touch proximally .  There is  hair growth on the feet and toes b/l. There is no edema b/l. No spider veins or varicosities present b/l.     Musculoskeletal:      Comments: Equinus noted b/l ankles with < 10 deg DF noted. MMT 5/5 in DF/PF/Inv/Ev resistance with no reproduction of pain in any direction. Passive range of motion of ankle and pedal joints is painless b/l.     Feet:      Right foot:      Skin integrity: No callus or dry skin.      Left foot:      Skin integrity: No callus or dry skin.   Lymphadenopathy:      Comments: Negative lymphadenopathy bilateral popliteal fossa and tarsal tunnel.   Skin:     Comments: No open lesions, lacerations or  wounds noted.Interdigital spaces clean, dry and intact b/l. No erythema noted to b/l foot.  Nails normal color and trophic qualities.     Neurological:      Mental Status: He is alert.      Comments: Light touch, proprioception, and sharp/dull sensation are all intact bilaterally. Protective threshold with the Sutton-Wienstein monofilament is intact bilaterally.    Psychiatric:         Behavior: Behavior is cooperative.         Assessment:      Encounter Diagnoses   Name Primary?    Left foot pain     Plantar fasciitis Yes     Plan:     Chapo was seen today for foot pain.    Diagnoses and all orders for this visit:    Plantar fasciitis    Left foot pain  -     Ambulatory referral/consult to Podiatry    Other orders  -     diclofenac sodium (VOLTAREN) 1 % Gel; Apply 2 g topically once daily.  -     meloxicam (MOBIC) 15 MG tablet; Take 1 tablet (15 mg total) by mouth once daily.      I counseled the patient on his conditions, their implications and medical management.      Discussed different treatment options for heel pain. including conservative and interventional.  I gave written and verbal instructions on heel cord stretching and this was demonstrated for the patient. Patient expressed understanding. Discussed wearing appropriate shoe gear and avoiding flats, slippers, sandals, barefoot, and sockfeet. Recommended arch supports. My recommendation for OTC supports is [employer], [employer]ICS tennis shoes.       Patient instructed on adequate icing techniques. Patient should ice the affected area at least once per day x 10 minutes for 10 days . I advised the  patient that extra icing would also be beneficial to ensure adequate anti inflammatory effect     Stretching handout dispensed to patient. Instructions on adequate stretching reviewed in clinic      Rx Voltaren gel to be applied to affected area up to 3-4 x daily as needed for pain    Patient instructed on adequate icing techniques. Patient should ice the  affected area at least once per day x 10 minutes for 10 days . I advised the patient that extra icing would also be beneficial to ensure adequate anti inflammatory effect      Mobic prescribed. Patient was instructed on dosing information. Discontinue if adverse effects occur        RTC PRN

## 2023-07-25 DIAGNOSIS — G47.09 OTHER INSOMNIA: ICD-10-CM

## 2023-07-26 RX ORDER — AMLODIPINE BESYLATE 5 MG/1
5 TABLET ORAL DAILY
Qty: 90 TABLET | Refills: 12 | Status: SHIPPED | OUTPATIENT
Start: 2023-07-26 | End: 2024-03-05 | Stop reason: SDUPTHER

## 2023-07-26 RX ORDER — VALSARTAN 160 MG/1
160 TABLET ORAL DAILY
Qty: 90 TABLET | Refills: 3 | Status: SHIPPED | OUTPATIENT
Start: 2023-07-26 | End: 2024-03-19 | Stop reason: SDUPTHER

## 2023-07-26 RX ORDER — TRAZODONE HYDROCHLORIDE 50 MG/1
50 TABLET ORAL NIGHTLY PRN
Qty: 90 TABLET | Refills: 3 | Status: SHIPPED | OUTPATIENT
Start: 2023-07-26 | End: 2023-10-21

## 2023-07-26 NOTE — TELEPHONE ENCOUNTER
Care Due:                  Date            Visit Type   Department     Provider  --------------------------------------------------------------------------------                                [facility]                              FOLLOWUP/OF  MED/ INTERNAL  Prashanth Lagunas  Last Visit: 04-      FICE VISIT   MED/ PEDS      [place]  Next Visit: None Scheduled  None         None Found                                                            Last  Test          Frequency    Reason                     Performed    Due Date  --------------------------------------------------------------------------------    CMP.........  12 months..  valsartan................  10-   10-    [employer] Embedded Care Due Messages. Reference number: [id number].   7/25/2023 7:43:56 PM CDT

## 2023-10-21 DIAGNOSIS — G47.09 OTHER INSOMNIA: ICD-10-CM

## 2023-10-21 RX ORDER — TRAZODONE HYDROCHLORIDE 50 MG/1
50 TABLET ORAL NIGHTLY PRN
Qty: 90 TABLET | Refills: 1 | Status: SHIPPED | OUTPATIENT
Start: 2023-10-21

## 2023-10-21 NOTE — TELEPHONE ENCOUNTER
Care Due:                  Date            Visit Type   Department     Provider  --------------------------------------------------------------------------------                                [facility]                              FOLLOWUP/OF  MED/ INTERNAL  Prashanth Lagunas  Last Visit: 04-      FICE VISIT   MED/ PEDS      Ehrensing  Next Visit: None Scheduled  None         None Found                                                            Last  Test          Frequency    Reason                     Performed    Due Date  --------------------------------------------------------------------------------    CMP.........  12 months..  valsartan................  10-   10-    [employer] Embedded Care Due Messages. Reference number: [id number].   10/21/2023 3:49:59 AM CDT

## 2023-10-22 NOTE — TELEPHONE ENCOUNTER
Provider Staff:  Action required for this patient     Please see care gap opportunities below in Care Due Message.    Thanks!  [facility]     Appointments      Date Provider   Last Visit   4/19/2023 Prashanth Torres MD   Next Visit   Visit date not found Prashanth Torres MD     Refill Decision Note   Chapo Ohara  is requesting a refill authorization.  Brief Assessment and Rationale for Refill:  Approve     Medication Therapy Plan:         Comments:     Note composed:11:53 PM 10/21/2023

## 2023-11-06 ENCOUNTER — PATIENT MESSAGE (OUTPATIENT)
Dept: ADMINISTRATIVE | Facility: HOSPITAL | Age: 58
End: 2023-11-06
Payer: COMMERCIAL

## 2024-03-01 ENCOUNTER — PATIENT MESSAGE (OUTPATIENT)
Dept: ADMINISTRATIVE | Facility: HOSPITAL | Age: 59
End: 2024-03-01
Payer: COMMERCIAL

## 2024-03-05 NOTE — TELEPHONE ENCOUNTER
Care Due:                  Date            Visit Type   Department     Provider  --------------------------------------------------------------------------------                                [facility]                              [facility]LLOW[facility]OF  MED/ INTERNAL  [facility]  Last Visit: 04-      FICE VISIT   MED/ PEDS      Ehrensing                              [facility]                              FOLLOWUP/OF  MED/ INTERNAL  [facility]  Next Visit: 03-      FICE VISIT   MED/ PEDS      Ehrensing                                                            Last  Test          Frequency    Reason                     Performed    Due Date  --------------------------------------------------------------------------------    CMP.........  12 months..  valsartan................  10-   10-    [facility] Embedded Care Due Messages. Reference number: [id number].   3/05/2024 10:46:33 AM CST

## 2024-03-06 RX ORDER — AMLODIPINE BESYLATE 5 MG/1
5 TABLET ORAL DAILY
Qty: 90 TABLET | Refills: 12 | Status: SHIPPED | OUTPATIENT
Start: 2024-03-06 | End: 2024-03-19 | Stop reason: SDUPTHER

## 2024-03-19 ENCOUNTER — OFFICE VISIT (OUTPATIENT)
Dept: FAMILY MEDICINE | Facility: CLINIC | Age: 59
End: 2024-03-19
Payer: COMMERCIAL

## 2024-03-19 VITALS
BODY MASS INDEX: 30.42 KG/M2 | HEIGHT: 74 IN | SYSTOLIC BLOOD PRESSURE: 144 MMHG | HEART RATE: 62 BPM | TEMPERATURE: 98 F | DIASTOLIC BLOOD PRESSURE: 88 MMHG | WEIGHT: 237 LBS | OXYGEN SATURATION: 99 %

## 2024-03-19 DIAGNOSIS — Z86.010 HISTORY OF COLONIC POLYPS: ICD-10-CM

## 2024-03-19 DIAGNOSIS — N52.31 ERECTILE DYSFUNCTION AFTER RADICAL PROSTATECTOMY: ICD-10-CM

## 2024-03-19 DIAGNOSIS — Z00.00 ROUTINE MEDICAL EXAM: Primary | ICD-10-CM

## 2024-03-19 DIAGNOSIS — G47.09 OTHER INSOMNIA: ICD-10-CM

## 2024-03-19 DIAGNOSIS — N39.3 MALE STRESS INCONTINENCE: ICD-10-CM

## 2024-03-19 DIAGNOSIS — G47.33 OSA (OBSTRUCTIVE SLEEP APNEA): ICD-10-CM

## 2024-03-19 DIAGNOSIS — C61 PROSTATE CANCER: ICD-10-CM

## 2024-03-19 DIAGNOSIS — E55.9 VITAMIN D DEFICIENCY DISEASE: ICD-10-CM

## 2024-03-19 DIAGNOSIS — N45.1 EPIDIDYMITIS: ICD-10-CM

## 2024-03-19 DIAGNOSIS — I10 ESSENTIAL HYPERTENSION: ICD-10-CM

## 2024-03-19 DIAGNOSIS — E78.5 HYPERLIPIDEMIA, UNSPECIFIED HYPERLIPIDEMIA TYPE: ICD-10-CM

## 2024-03-19 PROCEDURE — 99999 PR PBB SHADOW E&M-EST. PATIENT-LVL III: CPT | Mod: PBBFAC,,, | Performed by: INTERNAL MEDICINE

## 2024-03-19 PROCEDURE — 99396 PREV VISIT EST AGE 40-64: CPT | Mod: S$GLB,,, | Performed by: INTERNAL MEDICINE

## 2024-03-19 RX ORDER — VALSARTAN 160 MG/1
160 TABLET ORAL 2 TIMES DAILY
Qty: 180 TABLET | Refills: 3 | Status: SHIPPED | OUTPATIENT
Start: 2024-03-19 | End: 2025-03-19

## 2024-03-19 RX ORDER — AMLODIPINE BESYLATE 10 MG/1
10 TABLET ORAL DAILY
Qty: 90 TABLET | Refills: 12 | Status: SHIPPED | OUTPATIENT
Start: 2024-03-19

## 2024-03-19 RX ORDER — DOXYCYCLINE 100 MG/1
100 CAPSULE ORAL 2 TIMES DAILY
Qty: 20 CAPSULE | Refills: 0 | Status: SHIPPED | OUTPATIENT
Start: 2024-03-19

## 2024-03-19 NOTE — PROGRESS NOTES
Chief complaint, physical exam      58-year-old black male .  Here for his physical.  Apparently his wife works in [facility] and when he visits there he gets a full physical including stress test and so forth.  He never took a cholesterol pill because he was reluctant says his cholesterol last year improved.  He had his PSA done last year.  He does not have those results.  We discussed that if PSA slightly rises it could be an indication of prostate cancer returning we would need him to see Urology again.  He does get some recurrent left scrotal pain above the testicle but now he is getting it on the right.  He mentioned this has the end of the visit.  Discussed possibility of epididymitis will assess response to 10 days of doxycycline and if indeed it recurrent or persistent we would have him evaluated by Urology.    He does bring in some blood pressure readings in his really only three normal readings and I explained to him that he has a double risk of stroke if his blood pressures in the high 130s over high 80s and most of them are that are in the 140s.  He is taking amlodipine 5 mg at night and 10 mg in the morning and we had not yet changed his prescription from the 5 mg pill so he was running out.  We discussed he can really only benefit from 10 mg a day.  His valsartan is only 160 so will change that to twice daily or once a day so that he can go up presuming that all of his current readings are high he still needs adjustment.  He had one pulse reading of 51 but no other low pulses.  He was concerned because his blood pressure was normal in the 120s he thought he might feel bad and he thought that might be too low but explained to him otherwise.    When seen 2023, [place] and brings in a workup that he had done.  His blood pressure was high.  He was out of his amlodipine.  It looks like he was given Micardis 80 mg and he is out of that now having no refills.  It was causing him  nausea every time he took it which I explained was unusual for an ARB but we will check.  We discussed how he might need multiple medications for his blood pressure with an average of three.  We will restart the Norvasc 5 mg and start valsartan 160 mg and encouraged him to monitor over 2-3 weeks and report response     He does bring in records from outside the clinic which was some normal lab work.  Looks like he had some urinary flow assessment and they made recommendation that he might need urodynamic studies after having had his sling for stress incontinence.  He would like a referral back to his urologist that did the sling.  He otherwise notes no particular problem.  He had a renal ultrasound outside the clinic which looks like it was unremarkable with no postvoid residual.  He is not having any pain so will need to assess if there is any urinary flow issues.  Looks like they may just made some assessment that he should be checked again.  His stress incontinence has resolved.  He wears a small pad just in case.    Also with some left arch pain - seen Podiatry.     Patient last seen in consultation from Urology Dr. El.  He has some significant erectile dysfunction not responsive completely to medications and complicated by Peyronie's disease.  s/p placement of a 3 piece AMS IPP with penile modeling and male sling on 10/18/22       He also has urinary incontinence after his prostatectomy and apparently may have had a procedure for the incontinence which is not really a problem right now.  Looks just a little bit.  Not a problem.      He had 4 uncles with colon cancer.     1 colon polyp 3/22 -5 yrs       We also discussed his severe hyperlipidemia and the benefits of statin and so will try Crestor and he can look on the good Rx card and if Lipitor is cheaper we will call in accordingly. Not on  statin ??? Apparently better w labs out the country    We discussed his vitamin-D deficiency and he is taking a  multivitamin but probably should add 2000 units.    Apparently he has sleep apnea by remote test but never had CPAP.  Apparently when he took the sleep apnea test he had some dental infections and was told by the dentist that might have affected his sleep study.  He only sleeps about 5 hr per day but is not sleepy during the day or as symptoms of sleep apnea.        ROS:   CONST: weight stable. EYES: no vision change. ENT: no sore throat. CV: no chest pain w/ exertion. RESP: no shortness of breath. GI: no nausea, vomiting, diarrhea. No dysphagia. : no urinary issues. MUSCULOSKELETAL: no new myalgias or arthralgias. SKIN: no new changes. NEURO: no focal deficits. PSYCH: no new issues. ENDOCRINE: no polyuria. HEME: no lymph nodes. ALLERGY: no general pruritis.     PMH:   1.  in 2006, 200 in 2014  2. HTN  3. WANDA , not on CPAP  4. 3 Polyps 3/15 --1 colon polyp 3/22 -5 yrs -4 uncles w colon cancer   5.  Anemia, likely secondary to blood donations 4 times a year  6.  Vitamin-D deficiency  7.  Prostate cancer -robotic assisted laparoscopic prostatectomy on 1/20/2021  8.    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  Does not smoke or drink.                                                                                         FMH: Father and brother with prostate cancer at age 50.  1 uncle with colon cancer.  Mom with borderline DM.  One sister and four brothers with no problem.    Vitals as above,   Gen: no distress  EYES: conjunctiva clear, non-icteric, PERRL  ENT: nose clear, nasal mucosa normal, oropharynx clear and moist, teeth good  NECK:supple, thyroid  non-palpable  RESP: effort is good, lungs clear  CV: heart RRR w/o murmur, gallops or rubs; no carotid bruits, no edema  GI: abdomen soft, non-distended, non-tender, no hepatosplenomegaly  MS: gait normal, no clubbing or cyanosis of the digits  SKIN: no rashes, warm to touch    Diagnoses and all orders for this visit:    Routine medical exam, update PSA, up-to-date on colonoscopy  -     Prostate Specific Antigen, Diagnostic; Future  -     TSH; Future  -     Lipid Panel; Future  -     CBC Auto Differential; Future  -     Comprehensive Metabolic Panel; Future  -     Vitamin D; Future    History of colonic polyps    Essential hypertension, uncontrolled, amlodipine 10 mg and increase valsartan to 320 either all at one time or split  -     Prostate Specific Antigen, Diagnostic; Future  -     TSH; Future  -     Lipid Panel; Future  -     CBC Auto Differential; Future  -     Comprehensive Metabolic Panel; Future  -     Vitamin D; Future    Erectile dysfunction after radical prostatectomy, status post implant    Hyperlipidemia, unspecified hyperlipidemia type, high LDL and explained that with hypertension he has an increased risk of heart attack and stroke eventually.  If indeed high I would recommend treatment    Male stress incontinence, chronic and stable    Prostate cancer  -     Prostate Specific Antigen, Diagnostic; Future    WANDA (obstructive sleep apnea)    Vitamin D deficiency disease  -     Vitamin D; Future    Other insomnia, chronic and stable    Epididymitis, new issue, treat with doxycycline    Other orders  -     amLODIPine (NORVASC) 10 MG tablet; Take 1 tablet (10 mg total) by mouth once daily.  -     valsartan (DIOVAN) 160 MG tablet; Take 1 tablet (160 mg total) by mouth 2 (two) times daily.  -     doxycycline (MONODOX) 100 MG capsule; Take 1 capsule (100 mg total) by mouth 2 (two) times daily.

## 2024-03-27 ENCOUNTER — TELEPHONE (OUTPATIENT)
Dept: FAMILY MEDICINE | Facility: CLINIC | Age: 59
End: 2024-03-27
Payer: COMMERCIAL

## 2024-03-27 NOTE — TELEPHONE ENCOUNTER
Abhilash informed me that he left the patient a voicemail. The patient is attempting to contact Radha.

## 2024-03-27 NOTE — TELEPHONE ENCOUNTER
----- Message from Beti Lew sent at 3/27/2024  1:27 PM CDT -----  Regarding: [id number]  Type: Patient Call Back    Who called:self     What is the request in detail: pt states he will like a call back from the nurse     Can the clinic reply by [facility]?no     Would the patient rather a call back or a response via [facility]? Callback     Best call back number:[phone]       Additional Information:

## 2024-03-28 ENCOUNTER — LAB VISIT (OUTPATIENT)
Dept: LAB | Facility: HOSPITAL | Age: 59
End: 2024-03-28
Attending: INTERNAL MEDICINE
Payer: COMMERCIAL

## 2024-03-28 DIAGNOSIS — C61 PROSTATE CANCER: ICD-10-CM

## 2024-03-28 DIAGNOSIS — Z00.00 ROUTINE MEDICAL EXAM: Primary | ICD-10-CM

## 2024-03-28 DIAGNOSIS — Z00.00 ROUTINE MEDICAL EXAM: ICD-10-CM

## 2024-03-28 DIAGNOSIS — I10 ESSENTIAL HYPERTENSION: ICD-10-CM

## 2024-03-28 DIAGNOSIS — E55.9 VITAMIN D DEFICIENCY DISEASE: ICD-10-CM

## 2024-03-28 LAB
ALBUMIN SERPL BCP-MCNC: 4.3 G/DL (ref 3.5–5.2)
ALP SERPL-CCNC: 55 U/L (ref 55–135)
ALT SERPL W/O P-5'-P-CCNC: 25 U/L (ref 10–44)
ANION GAP SERPL CALC-SCNC: 10 MMOL/L (ref 8–16)
AST SERPL-CCNC: 28 U/L (ref 10–40)
BASOPHILS # BLD AUTO: 0.03 K/UL (ref 0–0.2)
BASOPHILS NFR BLD: 0.5 % (ref 0–1.9)
BILIRUB SERPL-MCNC: 0.6 MG/DL (ref 0.1–1)
BUN SERPL-MCNC: 11 MG/DL (ref 6–20)
CALCIUM SERPL-MCNC: 10 MG/DL (ref 8.7–10.5)
CHLORIDE SERPL-SCNC: 102 MMOL/L (ref 95–110)
CHOLEST SERPL-MCNC: 226 MG/DL (ref 120–199)
CHOLEST/HDLC SERPL: 5 {RATIO} (ref 2–5)
CO2 SERPL-SCNC: 26 MMOL/L (ref 23–29)
CREAT SERPL-MCNC: 1 MG/DL (ref 0.5–1.4)
DIFFERENTIAL METHOD BLD: NORMAL
EOSINOPHIL # BLD AUTO: 0.2 K/UL (ref 0–0.5)
EOSINOPHIL NFR BLD: 2.9 % (ref 0–8)
ERYTHROCYTE [DISTWIDTH] IN BLOOD BY AUTOMATED COUNT: 13.9 % (ref 11.5–14.5)
EST. GFR  (NO RACE VARIABLE): >60 ML/MIN/1.73 M^2
GLUCOSE SERPL-MCNC: 71 MG/DL (ref 70–110)
HCT VFR BLD AUTO: 46.6 % (ref 40–54)
HDLC SERPL-MCNC: 45 MG/DL (ref 40–75)
HDLC SERPL: 19.9 % (ref 20–50)
HGB BLD-MCNC: 15.3 G/DL (ref 14–18)
IMM GRANULOCYTES # BLD AUTO: 0.01 K/UL (ref 0–0.04)
IMM GRANULOCYTES NFR BLD AUTO: 0.2 % (ref 0–0.5)
LDLC SERPL CALC-MCNC: 170.4 MG/DL (ref 63–159)
LYMPHOCYTES # BLD AUTO: 1.5 K/UL (ref 1–4.8)
LYMPHOCYTES NFR BLD: 27.8 % (ref 18–48)
MCH RBC QN AUTO: 29 PG (ref 27–31)
MCHC RBC AUTO-ENTMCNC: 32.8 G/DL (ref 32–36)
MCV RBC AUTO: 88 FL (ref 82–98)
MONOCYTES # BLD AUTO: 0.5 K/UL (ref 0.3–1)
MONOCYTES NFR BLD: 9.2 % (ref 4–15)
NEUTROPHILS # BLD AUTO: 3.2 K/UL (ref 1.8–7.7)
NEUTROPHILS NFR BLD: 59.4 % (ref 38–73)
NONHDLC SERPL-MCNC: 181 MG/DL
NRBC BLD-RTO: 0 /100 WBC
PLATELET # BLD AUTO: 249 K/UL (ref 150–450)
PMV BLD AUTO: 10.8 FL (ref 9.2–12.9)
POTASSIUM SERPL-SCNC: 4.5 MMOL/L (ref 3.5–5.1)
PROT SERPL-MCNC: 7.2 G/DL (ref 6–8.4)
RBC # BLD AUTO: 5.28 M/UL (ref 4.6–6.2)
SODIUM SERPL-SCNC: 138 MMOL/L (ref 136–145)
TRIGL SERPL-MCNC: 53 MG/DL (ref 30–150)
TSH SERPL DL<=0.005 MIU/L-ACNC: 1.15 UIU/ML (ref 0.4–4)
WBC # BLD AUTO: 5.46 K/UL (ref 3.9–12.7)

## 2024-03-28 PROCEDURE — 80061 LIPID PANEL: CPT | Performed by: INTERNAL MEDICINE

## 2024-03-28 PROCEDURE — 85025 COMPLETE CBC W/AUTO DIFF WBC: CPT | Performed by: INTERNAL MEDICINE

## 2024-03-28 PROCEDURE — 82306 VITAMIN D 25 HYDROXY: CPT | Performed by: INTERNAL MEDICINE

## 2024-03-28 PROCEDURE — 84270 ASSAY OF SEX HORMONE GLOBUL: CPT | Performed by: NURSE PRACTITIONER

## 2024-03-28 PROCEDURE — 84443 ASSAY THYROID STIM HORMONE: CPT | Performed by: INTERNAL MEDICINE

## 2024-03-28 PROCEDURE — 84153 ASSAY OF PSA TOTAL: CPT | Performed by: INTERNAL MEDICINE

## 2024-03-28 PROCEDURE — 80053 COMPREHEN METABOLIC PANEL: CPT | Performed by: INTERNAL MEDICINE

## 2024-03-29 LAB
25(OH)D3+25(OH)D2 SERPL-MCNC: 23 NG/ML (ref 30–96)
COMPLEXED PSA SERPL-MCNC: 0.73 NG/ML (ref 0–4)

## 2024-04-06 LAB
ALBUMIN SERPL-MCNC: 4.3 G/DL (ref 3.6–5.1)
SHBG SERPL-SCNC: 59 NMOL/L (ref 22–77)
TESTOST FREE SERPL-MCNC: 42.3 PG/ML (ref 46–224)
TESTOST SERPL-MCNC: 519 NG/DL (ref 250–1100)
TESTOSTERONE.FREE+WB SERPL-MCNC: 83.4 NG/DL

## 2024-04-08 ENCOUNTER — TELEPHONE (OUTPATIENT)
Dept: FAMILY MEDICINE | Facility: CLINIC | Age: 59
End: 2024-04-08
Payer: COMMERCIAL

## 2024-04-11 NOTE — TELEPHONE ENCOUNTER
----- Message from Sushma Carias sent at 3/27/2024  2:26 PM CDT -----  Regarding: Return Call  .Type:  Patient Returning Call    Who Called: Self     Who Left Message for Patient: MIGUELANGEL    Does the patient know what this is regarding?: yes     Would the patient rather a call back or a response via [facility]? Call     Best Call Back Number: .[phone]         GOAL: Pt will require min A with all bed mobility tasks within 4 weeks to increase ability to engage in functional mobility tasks.

## 2024-05-06 DIAGNOSIS — M25.559 HIP PAIN, UNSPECIFIED LATERALITY: Primary | ICD-10-CM

## 2024-05-10 ENCOUNTER — PATIENT OUTREACH (OUTPATIENT)
Dept: ADMINISTRATIVE | Facility: HOSPITAL | Age: 59
End: 2024-05-10
Payer: COMMERCIAL

## 2024-05-10 ENCOUNTER — OFFICE VISIT (OUTPATIENT)
Dept: ORTHOPEDICS | Facility: CLINIC | Age: 59
End: 2024-05-10
Payer: COMMERCIAL

## 2024-05-10 VITALS — WEIGHT: 237 LBS | HEIGHT: 74 IN | BODY MASS INDEX: 30.42 KG/M2

## 2024-05-10 DIAGNOSIS — M46.1 SACROILIITIS: Primary | ICD-10-CM

## 2024-05-10 DIAGNOSIS — M54.16 LUMBAR RADICULOPATHY: ICD-10-CM

## 2024-05-10 PROCEDURE — 1159F MED LIST DOCD IN RCRD: CPT | Mod: CPTII,S$GLB,, | Performed by: ORTHOPAEDIC SURGERY

## 2024-05-10 PROCEDURE — 3008F BODY MASS INDEX DOCD: CPT | Mod: CPTII,S$GLB,, | Performed by: ORTHOPAEDIC SURGERY

## 2024-05-10 PROCEDURE — 99203 OFFICE O/P NEW LOW 30 MIN: CPT | Mod: S$GLB,,, | Performed by: ORTHOPAEDIC SURGERY

## 2024-05-10 PROCEDURE — 99999 PR PBB SHADOW E&M-EST. PATIENT-LVL III: CPT | Mod: PBBFAC,,, | Performed by: ORTHOPAEDIC SURGERY

## 2024-05-10 PROCEDURE — 4010F ACE/ARB THERAPY RXD/TAKEN: CPT | Mod: CPTII,S$GLB,, | Performed by: ORTHOPAEDIC SURGERY

## 2024-05-10 RX ORDER — METHYLPREDNISOLONE 4 MG/1
TABLET ORAL
Qty: 1 EACH | Refills: 0 | Status: SHIPPED | OUTPATIENT
Start: 2024-05-10

## 2024-05-10 NOTE — PROGRESS NOTES
NEW PATIENT ORTHOPAEDIC: HIP    PRIMARY CARE PHYSICIAN: Prashanth Torres MD   REFERRING PROVIDER: Self, Aaareferral  No address on file     ASSESSMENT & PLAN:    Impression:  Right Hip sacroiliitis  Left leg lumbar radiculopathy    Follow Up Plan: PRN    Non operative care:    Chapo Ohara has physical exam evidence of above and wishes to pursue an non-operative care. I am recommending the following: Medrol Dose pack    Patient comes in with a one-week history of having right-sided lower back pain.  He does report a few weeks ago as well he had a left-sided back pain with radiculopathy extending down into his foot.  This is since resolved on its own but he has more persistent right hip pain.  He denies any specific groin pain.  He has radiographs today that demonstrate no acute osseous abnormality.  He does not have pain with internal or external rotation of his hip.  His pain appears to be more localized to his right SI joint where it is tender.  For this pain I am recommending Medrol Dosepak.  If ongoing pain despite this medication would next think about referral to pain management for evaluation of his lumbar spine, consideration of SI joint injection.    The patient has been ordered:  Pain Prescription    CONSULTS:   None    ACTIVE PROBLEM LIST  Patient Active Problem List   Diagnosis    Sleep apnea    Hyperlipidemia    HTN (hypertension)    Cardiac murmur    Other insomnia    Prostate cancer    Vitamin D deficiency disease    WANDA (obstructive sleep apnea)    Peyronie's disease    Erectile dysfunction after radical prostatectomy    Male stress incontinence           SUBJECTIVE    CHIEF COMPLAINT: Hip Pain    HPI:   Chapo Ohara is a 59 y.o. male here for evaluation and management of right hip pain. There is not a specific incident that brought about this pain. he has had progressive problems with the hip(s) starting 1 weeks ago and is progressing which is now interfering with activities which include:  functional household ADL's, enjoying hobbies, and rising from a sitting position    Currently the pain in the joint is mild with activity.  The pain is intermittent and is located in buttocks.  The pain is described as aching and sharp. Relieving factors include rest and over the counter medication . No associated Catching, Clicking, and Popping.     They do not report leg length inequality.     Chapo Ohara has no additional complaints.     PROGRESSIVE SYMPTOMS:  Pain worsened by weight bearing  Pain effecting living situation    FUNCTIONAL STATUS:   Climb a flight of stairs or walk up a hill     PREVIOUS TREATMENTS:  Medical: None  Physical Therapy: Activities Modified   Previous Orthopaedic Surgery: None    REVIEW OF SYSTEMS:  PAIN ASSESSMENT:  See HPI.  MUSCULOSKELETAL: See HPI.  OTHER 10 point review of systems is negative except as stated in HPI above    PAST MEDICAL HISTORY   has a past medical history of History of colonic polyps (04/11/2017), HTN (hypertension) (03/07/2014), Hyperlipidemia (03/07/2014), and Prostate cancer.     PAST SURGICAL HISTORY   has a past surgical history that includes Colonoscopy (N/A, 12/11/2015); trus/bx 2020; Tumor removal; Prostate biopsy; Robot-assisted laparoscopic prostatectomy using da Natty Xi (N/A, 1/20/2021); Robot-assisted laparoscopic pelvic lymphadenectomy using da Natty Xi (Bilateral, 1/20/2021); Colonoscopy (N/A, 3/10/2022); Penile prosthesis implant (N/A, 10/18/2022); Insertion of sling for male urinary incontinence (N/A, 10/18/2022); and Cystoscopy with urodynamic testing (N/A, 11/28/2022).     FAMILY HISTORY  family history includes Hypertension in his father; No Known Problems in his mother.     SOCIAL HISTORY   reports that he has never smoked. He has never used smokeless tobacco. He reports that he does not drink alcohol and does not use drugs.     ALLERGIES   Review of patient's allergies indicates:  No Known Allergies     MEDICATIONS   Current Outpatient  "Medications on File Prior to Visit   Medication Sig Dispense Refill    amLODIPine (NORVASC) 10 MG tablet Take 1 tablet (10 mg total) by mouth once daily. 90 tablet 12    doxycycline (MONODOX) 100 MG capsule Take 1 capsule (100 mg total) by mouth 2 (two) times daily. 20 capsule 0    ketoconazole (NIZORAL) 2 % shampoo Apply topically twice a week. 500 mL 12    meloxicam (MOBIC) 15 MG tablet Take 1 tablet (15 mg total) by mouth once daily. 20 tablet 0    tadalafiL (CIALIS) 20 MG Tab Take 1 tablet (20 mg total) by mouth once daily. Take 1 hour before intercourse 10 tablet 11    traZODone (DESYREL) 50 MG tablet TAKE 1 TABLET(50 MG) BY MOUTH EVERY NIGHT AS NEEDED FOR INSOMNIA 90 tablet 1    valsartan (DIOVAN) 160 MG tablet Take 1 tablet (160 mg total) by mouth 2 (two) times daily. 180 tablet 3    diclofenac sodium (VOLTAREN) 1 % Gel Apply 2 g topically once daily. 100 g 3     Current Facility-Administered Medications on File Prior to Visit   Medication Dose Route Frequency Provider Last Rate Last Admin    alprostadiL (PROSTIN VR) 50 mcg, phentolamine (REGITINE) 1 mg, papaverine 0.03 mg / 5 mL  0.1 mL Intracavernosal 1 time in Clinic/HOD Dale James MD        PHENYLephrine injection 800 mcg  800 mcg Intracavernosal Once Dale James MD              PHYSICAL EXAM   height is 6' 2" (1.88 m) and weight is 107.5 kg (236 lb 15.9 oz).   Body mass index is 30.43 kg/m².      All other systems deferred.  GENERAL:  No acute distress  HABITUS: Normal  GAIT: Non-antalgic  SKIN: Normal     HIP EXAM:    right:   ROM:   Flexion: 120 degrees    Internal Rotation: 30 degrees    External Rotation: 30 degrees    Abduction: 45 degrees    Adduction: 20 degrees  No apparent leg length discrepancy    Palpation: Yes tenderness over SI joint   Pain is not reproduced with IR or ER of the hip  Strength: 5/5 hip flexion, abduction, knee flexion and extension   Straight leg raise: Negative   Neurovascular Status: Sensation intact to light " touch in Sural, saphenous, SPN, DPN, Tibial nerve distribution  2+ pulse DP/PT, normal capillary refill, foot has normal warmth    DATA:  Diagnostic tests reviewed for today's visit:     Hip radiographs appears normal. No evidence of fracture, osseous abnormalities, or significant degenerative changes.

## 2024-08-16 ENCOUNTER — TELEPHONE (OUTPATIENT)
Dept: FAMILY MEDICINE | Facility: CLINIC | Age: 59
End: 2024-08-16
Payer: COMMERCIAL

## 2024-08-16 NOTE — TELEPHONE ENCOUNTER
Looks like patient was last seen back in March quite some time ago.    Please ask if he can use the portal and communicate that way since he can send us some blood pressure readings to review we can clarify which medication he thinks is causing the constipation since neither the amlodipine or the valsartan should be causing constipation and may relate to something else.  Please let him know that.      we increase the valsartan five months ago but valsartan should not cause constipation    If the constipation is something onset recently, it is probably from something else

## 2024-08-16 NOTE — TELEPHONE ENCOUNTER
Patient called and states there has been no change in his bp since starting his new bp medication and states that it has been causing constipation, patient requesting med change before his refill is due within the next week. Please Advise.

## 2024-08-26 ENCOUNTER — TELEPHONE (OUTPATIENT)
Dept: FAMILY MEDICINE | Facility: CLINIC | Age: 59
End: 2024-08-26
Payer: COMMERCIAL

## 2024-08-26 RX ORDER — IRBESARTAN 300 MG/1
300 TABLET ORAL NIGHTLY
Qty: 90 TABLET | Refills: 12 | Status: SHIPPED | OUTPATIENT
Start: 2024-08-26 | End: 2025-08-26

## 2024-08-26 RX ORDER — AMLODIPINE BESYLATE 10 MG/1
10 TABLET ORAL DAILY
Qty: 90 TABLET | Refills: 12 | Status: SHIPPED | OUTPATIENT
Start: 2024-08-26

## 2024-08-26 NOTE — TELEPHONE ENCOUNTER
----- Message from Rasheed Doran sent at 8/26/2024  1:40 PM CDT -----  Regarding: Chapo  Type:Patient Callback     Who called: Chapo     What is the request in detail: Pt stated that he has not heard back from the office about his blood pressure medication. He stated that the new ones that he is on is not working and his readings are still high. Please have someone reach out to the patient about this situation.     Can the clinic reply by [facility]? Yes     Would the patient rather a call back or a response via [facility]? Callback     Best call back number: [phone]\      Additional Information:

## 2024-08-26 NOTE — TELEPHONE ENCOUNTER
Sent patient a message via the portal about some medication changes.  Please call and let him know I sent a message to him so then he can reply directly back to me and I sent in a new medication for him to try

## 2024-08-26 NOTE — TELEPHONE ENCOUNTER
Pt says this the last week of his Valsartan and doesn't want to take it anymore. He says his systolic is in the 150's still and his constipation started with the medication. He wants another medication and I Told him I need him to send in his reading via the portal so you can adjust his medication.

## 2024-08-31 DIAGNOSIS — G47.09 OTHER INSOMNIA: ICD-10-CM

## 2024-08-31 NOTE — TELEPHONE ENCOUNTER
No care due was identified.  [facility] Embedded Care Due Messages. Reference number: [id number].   8/31/2024 2:17:41 PM CDT

## 2024-09-02 RX ORDER — AMLODIPINE BESYLATE 10 MG/1
10 TABLET ORAL DAILY
Qty: 90 TABLET | Refills: 12 | Status: SHIPPED | OUTPATIENT
Start: 2024-09-02

## 2024-09-02 RX ORDER — IRBESARTAN 300 MG/1
300 TABLET ORAL NIGHTLY
Qty: 90 TABLET | Refills: 12 | Status: SHIPPED | OUTPATIENT
Start: 2024-09-02

## 2024-09-02 RX ORDER — TRAZODONE HYDROCHLORIDE 50 MG/1
50 TABLET ORAL NIGHTLY PRN
Qty: 90 TABLET | Refills: 1 | Status: SHIPPED | OUTPATIENT
Start: 2024-09-02

## 2024-09-04 ENCOUNTER — TELEPHONE (OUTPATIENT)
Dept: UROLOGY | Facility: CLINIC | Age: 59
End: 2024-09-04
Payer: COMMERCIAL

## 2024-09-04 NOTE — TELEPHONE ENCOUNTER
Call was placed back to patient informing him that due to not being seen in over a year he would need to follow back in clinic in order to receive an Medication Refill patient was scheduled successfully @ 09.05 Patient agreed and confirmed upcoming appointment.

## 2024-09-05 ENCOUNTER — OFFICE VISIT (OUTPATIENT)
Dept: UROLOGY | Facility: CLINIC | Age: 59
End: 2024-09-05
Payer: COMMERCIAL

## 2024-09-05 VITALS
HEART RATE: 74 BPM | HEIGHT: 74 IN | BODY MASS INDEX: 26.94 KG/M2 | DIASTOLIC BLOOD PRESSURE: 100 MMHG | SYSTOLIC BLOOD PRESSURE: 159 MMHG | WEIGHT: 209.88 LBS

## 2024-09-05 DIAGNOSIS — C61 PROSTATE CANCER: ICD-10-CM

## 2024-09-05 DIAGNOSIS — Z96.0 S/P INSERTION OF PENILE IMPLANT: ICD-10-CM

## 2024-09-05 DIAGNOSIS — N52.31 ERECTILE DYSFUNCTION AFTER RADICAL PROSTATECTOMY: Primary | ICD-10-CM

## 2024-09-05 DIAGNOSIS — Z90.79 S/P PROSTATECTOMY: ICD-10-CM

## 2024-09-05 DIAGNOSIS — R97.20 PSA ELEVATION: ICD-10-CM

## 2024-09-05 PROCEDURE — 4010F ACE/ARB THERAPY RXD/TAKEN: CPT | Mod: CPTII,S$GLB,,

## 2024-09-05 PROCEDURE — 1159F MED LIST DOCD IN RCRD: CPT | Mod: CPTII,S$GLB,,

## 2024-09-05 PROCEDURE — 1160F RVW MEDS BY RX/DR IN RCRD: CPT | Mod: CPTII,S$GLB,,

## 2024-09-05 PROCEDURE — 3008F BODY MASS INDEX DOCD: CPT | Mod: CPTII,S$GLB,,

## 2024-09-05 PROCEDURE — 99214 OFFICE O/P EST MOD 30 MIN: CPT | Mod: S$GLB,,,

## 2024-09-05 PROCEDURE — 99999 PR PBB SHADOW E&M-EST. PATIENT-LVL III: CPT | Mod: PBBFAC,,,

## 2024-09-05 PROCEDURE — 3080F DIAST BP >= 90 MM HG: CPT | Mod: CPTII,S$GLB,,

## 2024-09-05 PROCEDURE — 3077F SYST BP >= 140 MM HG: CPT | Mod: CPTII,S$GLB,,

## 2024-09-05 RX ORDER — TADALAFIL 20 MG/1
20 TABLET ORAL EVERY OTHER DAY
Qty: 20 TABLET | Refills: 11 | Status: SHIPPED | OUTPATIENT
Start: 2024-09-05 | End: 2025-09-05

## 2024-09-05 NOTE — PROGRESS NOTES
CHIEF COMPLAINT:  ED      HISTORY OF PRESENTING ILLINESS:  Chapo Ohara is a 57 y.o. male with prostate cancer s/p RALP on 1/20/2021 by Dr. Hammond.  Since then, he c/o severe ED.  He's s/p placement of a 3 piece AMS IPP with penile modeling and male sling on 10/18/2022. He states that he is having some trouble penetrating because his glans does not get hard. He was last seen by Dr. El 2/13/2023 and received 20 mg tadalafil, also recommended lubrication to help with penetration.     No issues with urination  Taking 20 mg tadalafil on demand, feels like there is room for improvement.   Has not followed up with Dr. Hammond for prostate cancer f/u s/p RALP.        REVIEW OF SYSTEMS:  Review of Systems   Constitutional:  Negative for chills and fever.   HENT:  Negative for congestion and sore throat.    Eyes:  Negative for blurred vision.   Respiratory:  Negative for cough and shortness of breath.    Cardiovascular:  Negative for chest pain and palpitations.   Gastrointestinal:  Negative for nausea and vomiting.   Genitourinary:  Negative for dysuria, flank pain, frequency, hematuria and urgency.   Neurological:  Negative for dizziness and headaches.         PATIENT HISTORY:    Past Medical History:   Diagnosis Date    History of colonic polyps 04/11/2017    HTN (hypertension) 03/07/2014    Hyperlipidemia 03/07/2014    patient states he does not take medication    Prostate cancer        Past Surgical History:   Procedure Laterality Date    COLONOSCOPY N/A 12/11/2015    Procedure: COLONOSCOPY;  Surgeon: Balta Farah MD;  Location: [facility] ENDO;  Service: Endoscopy;  Laterality: N/A;    COLONOSCOPY N/A 3/10/2022    Procedure: COLONOSCOPY;  Surgeon: Natalia Mcqueen MD;  Location: [facility] ENDO;  Service: Endoscopy;  Laterality: N/A;  1/26 covid test 3/7 @ [place]; instructions to portal-st    CYSTOSCOPY WITH URODYNAMIC TESTING N/A 11/28/2022    Procedure: CYSTOSCOPY, WITH URODYNAMIC TESTING;  Surgeon: Jason Castaneda,  MD;  Location: [facility] OR 1ST FLR;  Service: Urology;  Laterality: N/A;  1hr    INSERTION OF SLING FOR MALE URINARY INCONTINENCE N/A 10/18/2022    Procedure: INSERTION, BLADDER SLING, MALE, FOR URINARY INCONTINENCE;  Surgeon: Jason Castaneda MD;  Location: [facility] OR 2ND FLR;  Service: Urology;  Laterality: N/A;    PENILE PROSTHESIS IMPLANT N/A 10/18/2022    Procedure: INSERTION, PENILE PROSTHESIS;  Surgeon: Td El MD;  Location: [facility] OR 2ND FLR;  Service: Urology;  Laterality: N/A;  1.5  hours    PROSTATE BIOPSY      ROBOT-ASSISTED LAPAROSCOPIC PELVIC LYMPHADENECTOMY USING DA ERNIE XI Bilateral 1/20/2021    Procedure: XI ROBOTIC LYMPHADENECTOMY, PELVIC;  Surgeon: BOOGIE Hammond MD;  Location: [facility];  Service: Urology;  Laterality: Bilateral;    ROBOT-ASSISTED LAPAROSCOPIC PROSTATECTOMY USING DA ERNIE XI N/A 1/20/2021    Procedure: XI ROBOTIC PROSTATECTOMY;  Surgeon: BOOGIE Hammond MD;  Location: [facility];  Service: Urology;  Laterality: N/A;    trus/bx 2020      TUMOR REMOVAL      back of head       Family History   Problem Relation Name Age of Onset    No Known Problems Mother      Hypertension Father         Social History     Socioeconomic History    Marital status:    Occupational History     Employer: [employer]   Tobacco Use    Smoking status: Never    Smokeless tobacco: Never   Substance and Sexual Activity    Alcohol use: No    Drug use: No    Sexual activity: Yes     Partners: Female     Social Determinants of Health     Financial Resource Strain: Low Risk  (3/18/2024)    Overall Financial Resource Strain (CARDIA)     Difficulty of Paying Living Expenses: Not hard at all   Food Insecurity: No Food Insecurity (3/18/2024)    Hunger Vital Sign     Worried About Running Out of Food in the Last Year: Never true     Ran Out of Food in the Last Year: Never true   Transportation Needs: No Transportation Needs (3/18/2024)    PRAPARE - Transportation     Lack of Transportation (Medical):  No     Lack of Transportation (Non-Medical): No   Physical Activity: Sufficiently Active (3/18/2024)    Exercise Vital Sign     Days of Exercise per Week: 5 days     Minutes of Exercise per Session: 50 min   Stress: No Stress Concern Present (3/18/2024)    [employer] - Occupational Stress Questionnaire     Feeling of Stress : Not at all   Housing Stability: Low Risk  (3/18/2024)    Housing Stability Vital Sign     Unable to Pay for Housing in the Last Year: No     Number of Places Lived in the Last Year: 1     Unstable Housing in the Last Year: No       Allergies:  Patient has no known allergies.    Medications:    Current Outpatient Medications:     amLODIPine (NORVASC) 10 MG tablet, Take 1 tablet (10 mg total) by mouth once daily., Disp: 90 tablet, Rfl: 12    diclofenac sodium (VOLTAREN) 1 % Gel, Apply 2 g topically once daily., Disp: 100 g, Rfl: 3    doxycycline (MONODOX) 100 MG capsule, Take 1 capsule (100 mg total) by mouth 2 (two) times daily., Disp: 20 capsule, Rfl: 0    irbesartan (AVAPRO) 300 MG tablet, Take 1 tablet (300 mg total) by mouth every evening., Disp: 90 tablet, Rfl: 12    ketoconazole (NIZORAL) 2 % shampoo, Apply topically twice a week., Disp: 500 mL, Rfl: 12    meloxicam (MOBIC) 15 MG tablet, Take 1 tablet (15 mg total) by mouth once daily., Disp: 20 tablet, Rfl: 0    methylPREDNISolone (MEDROL DOSEPACK) 4 mg tablet, use as directed, Disp: 1 each, Rfl: 0    traZODone (DESYREL) 50 MG tablet, Take 1 tablet (50 mg total) by mouth nightly as needed for Insomnia., Disp: 90 tablet, Rfl: 1    tadalafiL (CIALIS) 20 MG Tab, Take 1 tablet (20 mg total) by mouth every other day. Take 1 hour before intercourse, Disp: 20 tablet, Rfl: 11    Current Facility-Administered Medications:     alprostadiL (PROSTIN VR) 50 mcg, phentolamine (REGITINE) 1 mg, papaverine 0.03 mg / 5 mL, 0.1 mL, Intracavernosal, 1 time in Clinic/HOD, Dale James MD    PHENYLephrine injection 800 mcg,  800 mcg, Intracavernosal, Once, Dale James MD    PHYSICAL EXAMINATION:  Physical Exam  HENT:      Head: Normocephalic and atraumatic.      Right Ear: External ear normal.      Left Ear: External ear normal.      Nose: Nose normal.      Mouth/Throat:      Mouth: Mucous membranes are moist.   Pulmonary:      Effort: Pulmonary effort is normal. No respiratory distress.   Skin:     General: Skin is warm and dry.   Neurological:      General: No focal deficit present.      Mental Status: He is alert and oriented to person, place, and time.   Psychiatric:         Mood and Affect: Mood normal.         Behavior: Behavior normal.           Lab Results   Component Value Date    PSA 5.1 (H) 06/26/2020    PSA 3.9 04/11/2018    PSA 3.1 04/11/2017    PSADIAG 0.73 03/28/2024    PSADIAG 0.05 08/02/2022    PSADIAG 0.05 05/04/2022       Lab Results   Component Value Date    CREATININE 1.0 03/28/2024    EGFRNORACEVR >60.0 03/28/2024         IMPRESSION:    Encounter Diagnoses   Name Primary?    Erectile dysfunction after radical prostatectomy Yes    Prostate cancer     S/P insertion of penile implant          Assessment:       1. Erectile dysfunction after radical prostatectomy    2. Prostate cancer    3. S/P insertion of penile implant        Plan:   - 20 mg tadalafil every other day for better results.   - Discussed PSA result from 3/2024. Recommended referral to Dr. Warner to discuss possibility of biochemical recurrence of prostate cancer. Message sent to Dr. Torres.     RTC 1 year or sooner PRN    I spent 30 minutes with the patient of which more than half was spent in direct consultation with the patient in regards to our treatment and plan.  We addressed the chief complaint as well as other office findings during the visit. Reviewed the possible contributory factors.

## 2024-09-19 ENCOUNTER — TELEPHONE (OUTPATIENT)
Dept: FAMILY MEDICINE | Facility: CLINIC | Age: 59
End: 2024-09-19
Payer: COMMERCIAL

## 2024-09-19 NOTE — TELEPHONE ENCOUNTER
The patient dropped off a document his recent BP readings. The document is on your desk. Please review and advise. Thank you.

## 2024-09-26 VITALS — DIASTOLIC BLOOD PRESSURE: 88 MMHG | SYSTOLIC BLOOD PRESSURE: 138 MMHG

## 2024-09-26 RX ORDER — HYDROCHLOROTHIAZIDE 25 MG/1
25 TABLET ORAL DAILY
Qty: 90 TABLET | Refills: 11 | Status: SHIPPED | OUTPATIENT
Start: 2024-09-26 | End: 2025-09-26

## 2024-09-26 NOTE — TELEPHONE ENCOUNTER
Let patient know Dr. DANG had to review all of the blood pressure readings that you sent and they all do look significantly high.  Since you are on the maximum irbesartan and amlodipine it is time to simply add a 3rd medication which is very very common.      He sent hydrochlorothiazide or HCTZ 25 mg to take daily with the other medications and follow the blood pressure.      Dr. Torres sees that you are active on the portal and that would be an even easier way to report blood pressure readings and he can communicate directly back to you.  If you can get your my [facility] portal working that would be great     Let patient know he can come by the O bar here if there is any problem getting it set up again, password issues and so forth

## 2024-10-14 NOTE — TELEPHONE ENCOUNTER
----- Message from Paola Gruber sent at 9/4/2024 12:52 PM CDT -----  .Type: RX Refill Request    Who Called: Self     Have you contacted your pharmacy: Yes     Refill or New Rx: Refill     RX Name and Strength:tadalafiL (CIALIS) 20 MG Tab    Preferred Pharmacy with phone number:.  [address] [address]  Phone: [phone] Fax: [phone]        Local or Mail Order: Local     Ordering Provider: Td El     Would the patient rather a call back or a response via My [facility]? Call Back     Best Call Back Number: .[phone] (home)       Additional Information:   Admission

## 2024-10-24 ENCOUNTER — LAB VISIT (OUTPATIENT)
Dept: LAB | Facility: HOSPITAL | Age: 59
End: 2024-10-24
Attending: RADIOLOGY
Payer: COMMERCIAL

## 2024-10-24 ENCOUNTER — OFFICE VISIT (OUTPATIENT)
Dept: RADIATION ONCOLOGY | Facility: CLINIC | Age: 59
End: 2024-10-24
Payer: COMMERCIAL

## 2024-10-24 VITALS
WEIGHT: 248.44 LBS | BODY MASS INDEX: 36.8 KG/M2 | SYSTOLIC BLOOD PRESSURE: 143 MMHG | HEIGHT: 69 IN | DIASTOLIC BLOOD PRESSURE: 88 MMHG | OXYGEN SATURATION: 98 % | HEART RATE: 75 BPM | TEMPERATURE: 98 F

## 2024-10-24 DIAGNOSIS — C61 PROSTATE CANCER: ICD-10-CM

## 2024-10-24 DIAGNOSIS — R97.20 PSA ELEVATION: ICD-10-CM

## 2024-10-24 DIAGNOSIS — Z90.79 S/P PROSTATECTOMY: ICD-10-CM

## 2024-10-24 LAB — COMPLEXED PSA SERPL-MCNC: 1 NG/ML (ref 0–4)

## 2024-10-24 PROCEDURE — 99999 PR PBB SHADOW E&M-EST. PATIENT-LVL IV: CPT | Mod: PBBFAC,,, | Performed by: RADIOLOGY

## 2024-10-24 PROCEDURE — 36415 COLL VENOUS BLD VENIPUNCTURE: CPT | Performed by: RADIOLOGY

## 2024-10-24 PROCEDURE — 84153 ASSAY OF PSA TOTAL: CPT | Performed by: RADIOLOGY

## 2024-10-24 NOTE — PROGRESS NOTES
Multidisciplinary Uro-Oncology Clinic  [facility] - Radiation Oncology     HISTORY OF PRESENT ILLNESS:   This patient presents for discussion of salvage irradiation for his prostate cancer.      Mr. Ohara has a history of pathologic stage IIIB (T3b, N0, M0, R1, GG2, PSA < 10) prostate cancer. He initially presented in 2020 with an elevated PSA of 5.1 ng/ml.  Biopsies revealed [place] 6 (3+3) involving 6/6 cores from the Lt. lobe of the prostate.  He subsequently underwent RALP with bilateral node dissection on  1/20/21.  Pathology revealed a 57 g prostate measuring 5,5 x 4 x 3.5 cm . There was [place] 7 (3+4) adenocarcinoma involving 10% of the prostate.  The [place] pattern 4 accounted for 20% of the tumor.  There was involvement of the Lt. seminal vesicle and margin at the apex.  There was no extraprostatic extension or bladder neck or lymphovascular invasion.  There was perineural invasion.  Eleven lymph nodes (10 Rt., 1 Lt.) were negative.  Postoperative PSA remained < 0.01 ng/ml until May of 2022 when it returned at 0.05 ng/ml. Repeat PSA in March of 2024 was 0.73 ng/ml.  The patient's wife works in [facility] and he spends half the year there.  Repeat PSA at the [facility] in [place] [facility] [place] on 9/24/24 was 1.01 ng/ml.  The patient presents for discussion of treatment.  Today the patient states he feels well.  No complaints.      REVIEW OF SYSTEMS:   Review of Systems   Constitutional:  Negative for chills, fever, malaise/fatigue and weight loss.   Respiratory:  Negative for cough and shortness of breath.    Cardiovascular:  Negative for chest pain and palpitations.   Gastrointestinal:  Negative for abdominal pain, constipation, diarrhea, nausea and vomiting.   Genitourinary:  Negative for dysuria, frequency, hematuria and urgency.        The patient denies significant incontinence.  He is status post sling procedure and placement of penile implant.    Completed Epic - 26     PAST MEDICAL  HISTORY:  Past Medical History:   Diagnosis Date    History of colonic polyps 04/11/2017    HTN (hypertension) 03/07/2014    Hyperlipidemia 03/07/2014    patient states he does not take medication    Prostate cancer        PAST SURGICAL HISTORY:  Past Surgical History:   Procedure Laterality Date    COLONOSCOPY N/A 12/11/2015    Procedure: COLONOSCOPY;  Surgeon: Balta Farah MD;  Location: [facility] ENDO;  Service: Endoscopy;  Laterality: N/A;    COLONOSCOPY N/A 3/10/2022    Procedure: COLONOSCOPY;  Surgeon: Natalia Mcqueen MD;  Location: [facility] ENDO;  Service: Endoscopy;  Laterality: N/A;  1/26 covid test 3/7 @ [place]; instructions to portal-st    CYSTOSCOPY WITH URODYNAMIC TESTING N/A 11/28/2022    Procedure: CYSTOSCOPY, WITH URODYNAMIC TESTING;  Surgeon: Jason Castaneda MD;  [facility];  Service: Urology;  Laterality: N/A;  1hr    INSERTION OF SLING FOR MALE URINARY INCONTINENCE N/A 10/18/2022    Procedure: INSERTION, BLADDER SLING, MALE, FOR URINARY INCONTINENCE;  Surgeon: Jason Castaneda MD;  Location: [facility] OR 2ND FLR;  Service: Urology;  Laterality: N/A;    PENILE PROSTHESIS IMPLANT N/A 10/18/2022    Procedure: INSERTION, PENILE PROSTHESIS;  Surgeon: Td El MD;  Location: [facility] OR 2ND FLR;  Service: Urology;  Laterality: N/A;  1.5  hours    PROSTATE BIOPSY      ROBOT-ASSISTED LAPAROSCOPIC PELVIC LYMPHADENECTOMY USING DA ERNIE XI Bilateral 1/20/2021    Procedure: XI ROBOTIC LYMPHADENECTOMY, PELVIC;  Surgeon: BOOGIE Hammond MD;  Location: [facility];  Service: Urology;  Laterality: Bilateral;    ROBOT-ASSISTED LAPAROSCOPIC PROSTATECTOMY USING DA ERNIE XI N/A 1/20/2021    Procedure: XI ROBOTIC PROSTATECTOMY;  Surgeon: BOOGIE Hammond MD;  Location: [facility];  Service: Urology;  Laterality: N/A;    trus/bx 2020      TUMOR REMOVAL      back of head       ALLERGIES:   Review of patient's allergies indicates:  No Known Allergies    MEDICATIONS:  Current Outpatient Medications    Medication Sig    amLODIPine (NORVASC) 10 MG tablet Take 1 tablet (10 mg total) by mouth once daily.    diclofenac sodium (VOLTAREN) 1 % Gel Apply 2 g topically once daily.    doxycycline (MONODOX) 100 MG capsule Take 1 capsule (100 mg total) by mouth 2 (two) times daily.    hydroCHLOROthiazide (HYDRODIURIL) 25 MG tablet Take 1 tablet (25 mg total) by mouth once daily.    irbesartan (AVAPRO) 300 MG tablet Take 1 tablet (300 mg total) by mouth every evening.    ketoconazole (NIZORAL) 2 % shampoo Apply topically twice a week.    meloxicam (MOBIC) 15 MG tablet Take 1 tablet (15 mg total) by mouth once daily.    methylPREDNISolone (MEDROL DOSEPACK) 4 mg tablet use as directed    tadalafiL (CIALIS) 20 MG Tab Take 1 tablet (20 mg total) by mouth every other day. Take 1 hour before intercourse    traZODone (DESYREL) 50 MG tablet Take 1 tablet (50 mg total) by mouth nightly as needed for Insomnia.     Current Facility-Administered Medications   Medication    alprostadiL (PROSTIN VR) 50 mcg, phentolamine (REGITINE) 1 mg, papaverine 0.03 mg / 5 mL    PHENYLephrine injection 800 mcg       SOCIAL HISTORY:  Social History     Socioeconomic History    Marital status:    Occupational History     Employer: [employer]   Tobacco Use    Smoking status: Never    Smokeless tobacco: Never   Substance and Sexual Activity    Alcohol use: No    Drug use: No    Sexual activity: Yes     Partners: Female     Social Drivers of Health     Financial Resource Strain: Low Risk  (3/18/2024)    Overall Financial Resource Strain (CARDIA)     Difficulty of Paying Living Expenses: Not hard at all   Food Insecurity: No Food Insecurity (3/18/2024)    Hunger Vital Sign     Worried About Running Out of Food in the Last Year: Never true     Ran Out of Food in the Last Year: Never true   Transportation Needs: No Transportation Needs (3/18/2024)    PRAPARE - Transportation     Lack of Transportation (Medical): No     Lack of Transportation  (Non-Medical): No   Physical Activity: Sufficiently Active (3/18/2024)    Exercise Vital Sign     Days of Exercise per Week: 5 days     Minutes of Exercise per Session: 50 min   Stress: No Stress Concern Present (3/18/2024)    [employer] - Occupational Stress Questionnaire     Feeling of Stress : Not at all   Housing Stability: Low Risk  (3/18/2024)    Housing Stability Vital Sign     Unable to Pay for Housing in the Last Year: No     Number of Places Lived in the Last Year: 1     Unstable Housing in the Last Year: No       FAMILY HISTORY:  Family History   Problem Relation Name Age of Onset    No Known Problems Mother      Hypertension Father           PHYSICAL EXAMINATION:  Vitals:    10/24/24 0922   BP: (!) 143/88   Pulse: 75   Temp: 97.9 °F (36.6 °C)     Physical Exam  Constitutional:       General: He is not in acute distress.     Appearance: Normal appearance.   Neurological:      Mental Status: He is alert and oriented to person, place, and time.   Psychiatric:         Mood and Affect: Mood normal.         Judgment: Judgment normal.       ASSESSMENT/PLAN:  History of pathologic Stage IIIB (T3b, N0, M0, R1, PSA < 10, GG2) adenocarcinoma of the prostate.     ECO    I had a long discussion with the patient.  Explained the significance of his increasing PSA.  Discussed the pathologic findings from his prostatectomy.  Explained the difference between local/regional recurrence vs distant recurrent disease.  Explained the concepts of salvage therapy.  Discussed the rational for consideration of salvage radiotherapy and hormonal deprivation therapy.  Discussed the procedures, risks and benefits of treatment.  Discussed the acute and long term side effects.  Overall, explained we would consider radiotherapy to the prostate bed and pelvic nodes combined with 6 - 12 months of hormonal deprivation therapy depending on the results of his metastatic work up.  Will plan to start work up  with repeat PSA,  followed by PSMA scan and possible MRI depending on the results.  Thank you for allowing us to participate in the care of this patient.     Psychosocial Distress screening score of Distress Score: 0 - No Distress noted and reviewed. No intervention indicated.     I spent approximately 45 minutes reviewing the available records and evaluating the patient, out of which over 50% of the time was spent face to face with the patient in counseling and coordinating this patient's care.

## 2024-10-25 DIAGNOSIS — C61 PROSTATE CANCER: Primary | ICD-10-CM

## 2024-12-02 RX ORDER — AMLODIPINE BESYLATE 10 MG/1
10 TABLET ORAL DAILY
Qty: 90 TABLET | Refills: 12 | Status: SHIPPED | OUTPATIENT
Start: 2024-12-02

## 2024-12-02 RX ORDER — IRBESARTAN 300 MG/1
300 TABLET ORAL NIGHTLY
Qty: 90 TABLET | Refills: 12 | Status: SHIPPED | OUTPATIENT
Start: 2024-12-02

## 2024-12-02 NOTE — TELEPHONE ENCOUNTER
----- Message from Ruthy sent at 12/2/2024  1:25 PM CST -----  Type: RX Refill Request    Who Called:  self     Have you contacted your pharmacy: no     Refill or New Rx: refill    RX Name and Strength: amLODIPine (NORVASC) 10 MG tablet  irbesartan (AVAPRO) 300 MG tablet      Preferred Pharmacy with phone number: [address]   Phone: [phone]  Fax: [phone]    Local or Mail Order: local    Would the patient rather a call back or a response via [facility]?  call    Best Call Back Number: .[phone] (home)      Additional Information:

## 2024-12-02 NOTE — TELEPHONE ENCOUNTER
No care due was identified.  [facility] Embedded Care Due Messages. Reference number: [id number].   12/02/2024 1:28:46 PM CST

## 2025-01-08 ENCOUNTER — HOSPITAL ENCOUNTER (OUTPATIENT)
Dept: RADIOLOGY | Facility: HOSPITAL | Age: 60
Discharge: HOME | End: 2025-01-08
Attending: RADIOLOGY
Payer: COMMERCIAL

## 2025-01-08 DIAGNOSIS — C61 PROSTATE CANCER: ICD-10-CM

## 2025-01-08 PROCEDURE — 78815 PET IMAGE W/CT SKULL-THIGH: CPT | Mod: 26,PI,, | Performed by: NUCLEAR MEDICINE

## 2025-01-08 PROCEDURE — A9596 HC GALLIUM GA-68 GOZETOTIDE, DX (ILLUCCIX), PER 1 MCI: HCPCS | Mod: TB | Performed by: RADIOLOGY

## 2025-01-08 PROCEDURE — 78815 PET IMAGE W/CT SKULL-THIGH: CPT | Mod: TC

## 2025-01-08 RX ADMIN — KIT FOR THE PREPARATION OF GALLIUM GA 68 GOZETOTIDE INJECTION 7.41 MILLICURIE: KIT INTRAVENOUS at 12:01

## 2025-01-13 ENCOUNTER — OFFICE VISIT (OUTPATIENT)
Dept: RADIATION ONCOLOGY | Facility: CLINIC | Age: 60
End: 2025-01-13
Attending: RADIOLOGY
Payer: COMMERCIAL

## 2025-01-13 ENCOUNTER — TELEPHONE (OUTPATIENT)
Dept: HEMATOLOGY/ONCOLOGY | Facility: CLINIC | Age: 60
End: 2025-01-13
Payer: COMMERCIAL

## 2025-01-13 ENCOUNTER — LAB VISIT (OUTPATIENT)
Dept: LAB | Facility: OTHER | Age: 60
End: 2025-01-13
Attending: RADIOLOGY
Payer: COMMERCIAL

## 2025-01-13 VITALS
HEIGHT: 74 IN | BODY MASS INDEX: 32.69 KG/M2 | RESPIRATION RATE: 16 BRPM | OXYGEN SATURATION: 100 % | DIASTOLIC BLOOD PRESSURE: 95 MMHG | WEIGHT: 254.69 LBS | HEART RATE: 60 BPM | SYSTOLIC BLOOD PRESSURE: 155 MMHG

## 2025-01-13 DIAGNOSIS — C61 PROSTATE CANCER: ICD-10-CM

## 2025-01-13 DIAGNOSIS — C61 PROSTATE CANCER: Primary | ICD-10-CM

## 2025-01-13 LAB — COMPLEXED PSA SERPL-MCNC: 0.84 NG/ML (ref 0–4)

## 2025-01-13 PROCEDURE — 1159F MED LIST DOCD IN RCRD: CPT | Mod: CPTII,S$GLB,, | Performed by: RADIOLOGY

## 2025-01-13 PROCEDURE — 36415 COLL VENOUS BLD VENIPUNCTURE: CPT | Performed by: RADIOLOGY

## 2025-01-13 PROCEDURE — 1160F RVW MEDS BY RX/DR IN RCRD: CPT | Mod: CPTII,S$GLB,, | Performed by: RADIOLOGY

## 2025-01-13 PROCEDURE — 3008F BODY MASS INDEX DOCD: CPT | Mod: CPTII,S$GLB,, | Performed by: RADIOLOGY

## 2025-01-13 PROCEDURE — 99999 PR PBB SHADOW E&M-EST. PATIENT-LVL IV: CPT | Mod: PBBFAC,,, | Performed by: RADIOLOGY

## 2025-01-13 PROCEDURE — 84153 ASSAY OF PSA TOTAL: CPT | Performed by: RADIOLOGY

## 2025-01-13 PROCEDURE — 3080F DIAST BP >= 90 MM HG: CPT | Mod: CPTII,S$GLB,, | Performed by: RADIOLOGY

## 2025-01-13 PROCEDURE — 99212 OFFICE O/P EST SF 10 MIN: CPT | Mod: S$GLB,,, | Performed by: RADIOLOGY

## 2025-01-13 PROCEDURE — 3077F SYST BP >= 140 MM HG: CPT | Mod: CPTII,S$GLB,, | Performed by: RADIOLOGY

## 2025-01-13 RX ORDER — BICALUTAMIDE 50 MG/1
50 TABLET, FILM COATED ORAL DAILY
Qty: 30 TABLET | Refills: 3 | Status: SHIPPED | OUTPATIENT
Start: 2025-01-13 | End: 2026-01-13

## 2025-01-13 NOTE — PROGRESS NOTES
[facility] - Radiation Oncology     Patient ID: Chapo Ohara is a 59 y.o. male.    Chief Complaint: Prostate Cancer (Rev PET)      Mr. Ohara has a history of pathologic stage IIIB (T3b, N0, M0, R1, GG2, PSA < 10) prostate cancer. He initially presented in 2020 with an elevated PSA of 5.1 ng/ml.  Biopsies revealed [place] 6 (3+3) involving 6/6 cores from the Lt. lobe of the prostate.  He subsequently underwent RALP with bilateral node dissection on  1/20/21.  Pathology revealed a 57 g prostate measuring 5,5 x 4 x 3.5 cm . There was [place] 7 (3+4) adenocarcinoma involving 10% of the prostate.  The [place] pattern 4 accounted for 20% of the tumor.  There was involvement of the Lt. seminal vesicle and margin at the apex.  There was no extraprostatic extension or bladder neck or lymphovascular invasion.  There was perineural invasion.  Eleven lymph nodes (10 Rt., 1 Lt.) were negative.  Postoperative PSA remained < 0.01 ng/ml until May of 2022 when it returned at 0.05 ng/ml. Repeat PSA in March of 2024 was 0.73 ng/ml.  The patient's wife works in [facility] and he spends half the year there.  Repeat PSA at the [facility] in [facility] on 9/24/24 was 1.01 ng/ml.      Review of Systems  Constitutional:  Negative for chills, fever, malaise/fatigue and weight loss.   Respiratory:  Negative for cough and shortness of breath.    Cardiovascular:  Negative for chest pain and palpitations.   Gastrointestinal:  Negative for abdominal pain, constipation, diarrhea, nausea and vomiting.   Genitourinary:  Negative for dysuria, frequency, hematuria and urgency.        The patient denies significant incontinence.  He is status post sling procedure and placement of penile implant.    Completed Epic - 26     Physical Exam  Constitutional:       General: He is not in acute distress.     Appearance: Normal appearance.   Neurological:      Mental Status: He is alert and oriented to person, place, and time.   Psychiatric:          Mood and Affect: Mood normal.         Judgment: Judgment normal.       PSMA scan revealed Postoperative changes with no tracer avid foci in the prostate bed .     Tracer avid soft tissue lesion, possible lymph node or inflammation, in the pelvis adjacent to the left side of the rectum.         Assessment and Plan    Prostate cancer - reviewed the images from his recent PSMA scan.  Explained he likely has disease in a pelvic lymph node.  Discussed the significance of this finding and again explained the rational for salvage irradiation and hormonal deprivation therapy.  Discussed the procedures, risks and benefits of therapy.  Discussed the acute and long term side effects of treatment.  Explained his hormonal therapy duration may be longer given the finding on the PSMA scan.  The patient has a Vit D deficiency.  Will discuss treatment with Dr. Chin.  Will recheck PSA today and start bilcalutimide.  Plan follow up with us in 4 months.

## 2025-02-03 ENCOUNTER — TELEPHONE (OUTPATIENT)
Dept: HEMATOLOGY/ONCOLOGY | Facility: CLINIC | Age: 60
End: 2025-02-03
Payer: COMMERCIAL

## 2025-02-04 ENCOUNTER — TELEPHONE (OUTPATIENT)
Dept: HEMATOLOGY/ONCOLOGY | Facility: CLINIC | Age: 60
End: 2025-02-04
Payer: COMMERCIAL

## 2025-02-04 NOTE — NURSING
Nurse navigator spoke with patient to coordinate appt with Dr. Chin on 2/6/25.  Patient states understanding.  All questions answered and contact info given for any future questions.

## 2025-02-06 ENCOUNTER — OFFICE VISIT (OUTPATIENT)
Dept: HEMATOLOGY/ONCOLOGY | Facility: CLINIC | Age: 60
End: 2025-02-06
Payer: COMMERCIAL

## 2025-02-06 VITALS
BODY MASS INDEX: 31.97 KG/M2 | HEART RATE: 59 BPM | TEMPERATURE: 98 F | DIASTOLIC BLOOD PRESSURE: 79 MMHG | OXYGEN SATURATION: 98 % | HEIGHT: 74 IN | WEIGHT: 249.13 LBS | RESPIRATION RATE: 18 BRPM | SYSTOLIC BLOOD PRESSURE: 129 MMHG

## 2025-02-06 DIAGNOSIS — C61 PROSTATE CANCER: Primary | ICD-10-CM

## 2025-02-06 DIAGNOSIS — Z79.818 ANDROGEN DEPRIVATION THERAPY: ICD-10-CM

## 2025-02-06 DIAGNOSIS — Z79.899 LONG TERM CURRENT USE OF THERAPEUTIC DRUG: ICD-10-CM

## 2025-02-06 PROCEDURE — 99999 PR PBB SHADOW E&M-EST. PATIENT-LVL V: CPT | Mod: PBBFAC,,, | Performed by: HOSPITALIST

## 2025-02-06 PROCEDURE — 3078F DIAST BP <80 MM HG: CPT | Mod: CPTII,S$GLB,, | Performed by: HOSPITALIST

## 2025-02-06 PROCEDURE — G2211 COMPLEX E/M VISIT ADD ON: HCPCS | Mod: S$GLB,,, | Performed by: HOSPITALIST

## 2025-02-06 PROCEDURE — 3008F BODY MASS INDEX DOCD: CPT | Mod: CPTII,S$GLB,, | Performed by: HOSPITALIST

## 2025-02-06 PROCEDURE — 99205 OFFICE O/P NEW HI 60 MIN: CPT | Mod: S$GLB,,, | Performed by: HOSPITALIST

## 2025-02-06 PROCEDURE — 1159F MED LIST DOCD IN RCRD: CPT | Mod: CPTII,S$GLB,, | Performed by: HOSPITALIST

## 2025-02-06 PROCEDURE — 3074F SYST BP LT 130 MM HG: CPT | Mod: CPTII,S$GLB,, | Performed by: HOSPITALIST

## 2025-02-06 RX ORDER — VIT C/E/ZN/COPPR/LUTEIN/ZEAXAN 250MG-90MG
1000 CAPSULE ORAL DAILY
Qty: 30 CAPSULE | Refills: 11 | Status: SHIPPED | OUTPATIENT
Start: 2025-02-06

## 2025-02-06 RX ORDER — PREDNISONE 5 MG/1
5 TABLET ORAL DAILY
Qty: 30 TABLET | Refills: 5 | Status: ACTIVE | OUTPATIENT
Start: 2025-02-06

## 2025-02-06 RX ORDER — ABIRATERONE ACETATE 250 MG/1
250 TABLET ORAL DAILY
Qty: 30 TABLET | Refills: 11 | Status: ACTIVE | OUTPATIENT
Start: 2025-02-06 | End: 2026-02-06

## 2025-02-06 NOTE — PROGRESS NOTES
[facility]: New patient visit  Best Contact Phone Number(s): [phone] (home)       Cancer/Stage/TNM:    Cancer Staging   Prostate cancer  Staging form: Prostate, AJCC 8th Edition  - Pathologic stage from 10/24/2024: Stage IIIB (pT3b, pN0, cM0, PSA: 5.1, Grade Group: 2) - Signed by Antoine Warner Jr., MD on 10/24/2024        Reason for visit:  Pelvic berta relapse of of high risk prostate cancer    Molecular:  Germline Testing: N/A  Somatic Testing: N/A    Treatment History:   N/A     HPI:   Chapo Ohara is a 59 y.o. male with biochemical and pelvic berta relapse of high risk prostate cancer. Initially underwent radical prostatectomy 01/20/2021 with pT3bN0 disease. PSA malcolm was <0.01. Had Rising PSA as of 05/2022, up to 1.0 10/2024. PSMA PET CT 01/2025 with tracer avid chuy-rectal soft tissue lesion c/f berta relapse. He presents to medical oncology clinic for initial evaluation.    Had pelvic sling after surgery. No sigficaciant urinary complaints. Currently in his typical state of health. Otherwise generally healthy. He takes amlodipie and HCTZ and irbesartan for HTN. Also with HLD; declined statin therapy. Last lipid panel 3/28/24.    History has been obtained by chart review and discussion with the patient.     Oncology History   Prostate cancer   11/11/2020 Initial Diagnosis    Prostate cancer     1/20/2021 Surgery    01/20/21 Radical prostatectomy. Prostate adenocarcinoma. Paolo 3+4. Positive surgical margin. No EPE. Left SV involved. Additional PNI. 0/10LN. pT3bN0     12/2021 Tumor Markers    12/14/21: PSA malcolm <0.01     8/2022 Tumor Markers    08/02/22: PSA 0.05     10/24/2024 Cancer Staged    Staging form: Prostate, AJCC 8th Edition  - Pathologic stage from 10/24/2024: Stage IIIB (pT3b, pN0, cM0, PSA: 5.1, Grade Group: 2)     10/2024 Tumor Markers    10/24/24: PSA 1.0     1/8/2025 Imaging Significant Findings    01/08/25 PSMA PET CT  Impression:  - Postoperative changes with  no tracer avid foci in the prostate bed .  - Tracer avid soft tissue lesion, possible lymph node or inflammation, in the pelvis adjacent to the left side of the rectum.  Attention to follow     2/6/2025 -  Chemotherapy    Treatment Summary   Plan Name: OP ABIRATERONE DAILY PREDNISONE BID  Treatment Goal: Curative  Status: Active  Start Date: 2/6/2025  End Date: 2/6/2025  Provider: Andrew Chin MD  Chemotherapy: abiraterone (ZYTIGA) 250 mg Tab, 250 mg, Oral, Daily, 1 of 1 cycle, Start date: 2/6/2025, End date: 2/6/2026           Past Medical History:   Diagnosis Date    History of colonic polyps 04/11/2017    HTN (hypertension) 03/07/2014    Hyperlipidemia 03/07/2014    patient states he does not take medication    Prostate cancer          Past Surgical History:   Procedure Laterality Date    COLONOSCOPY N/A 12/11/2015    Procedure: COLONOSCOPY;  Surgeon: Balta Farah MD;  Location: [facility];  Service: Endoscopy;  Laterality: N/A;    COLONOSCOPY N/A 3/10/2022    Procedure: COLONOSCOPY;  Surgeon: Natalia Mcqueen MD;  Location: [facility] ENDO;  Service: Endoscopy;  Laterality: N/A;  1/26 covid test 3/7 @ [place]; instructions to portal-st    CYSTOSCOPY WITH URODYNAMIC TESTING N/A 11/28/2022    Procedure: CYSTOSCOPY, WITH URODYNAMIC TESTING;  Surgeon: Jason Castaneda MD;  Location: [facility] OR 1ST FLR;  Service: Urology;  Laterality: N/A;  1hr    INSERTION OF SLING FOR MALE URINARY INCONTINENCE N/A 10/18/2022    Procedure: INSERTION, BLADDER SLING, MALE, FOR URINARY INCONTINENCE;  Surgeon: Jason Castaneda MD;  Location: [facility] OR 2ND FLR;  Service: Urology;  Laterality: N/A;    PENILE PROSTHESIS IMPLANT N/A 10/18/2022    Procedure: INSERTION, PENILE PROSTHESIS;  Surgeon: Td El MD;  Location: [facility] OR 2ND FLR;  Service: Urology;  Laterality: N/A;  1.5  hours    PROSTATE BIOPSY      ROBOT-ASSISTED LAPAROSCOPIC PELVIC LYMPHADENECTOMY USING DA ERNIE XI Bilateral 1/20/2021    Procedure: XI ROBOTIC  LYMPHADENECTOMY, PELVIC;  Surgeon: BOOGIE Hammond MD;  Location: [facility] OR;  Service: Urology;  Laterality: Bilateral;    ROBOT-ASSISTED LAPAROSCOPIC PROSTATECTOMY USING DA ERNIE XI N/A 1/20/2021    Procedure: XI ROBOTIC PROSTATECTOMY;  Surgeon: BOOGIE Hammond MD;  Location: [facility] OR;  Service: Urology;  Laterality: N/A;    trus/bx 2020      TUMOR REMOVAL      back of head         Review of patient's allergies indicates:  No Known Allergies      Current Outpatient Medications   Medication Sig Dispense Refill    abiraterone (ZYTIGA) 250 mg Tab Take 1 tablet (250 mg total) by mouth once daily Take with a low fat meal. 30 tablet 11    amLODIPine (NORVASC) 10 MG tablet Take 1 tablet (10 mg total) by mouth once daily. 90 tablet 12    bicalutamide (CASODEX) 50 MG Tab Take 1 tablet (50 mg total) by mouth once daily. 30 tablet 3    cholecalciferol, vitamin D3, (VITAMIN D3) 25 mcg (1,000 unit) capsule Take 1 capsule (1,000 Units total) by mouth once daily. 30 capsule 11    hydroCHLOROthiazide (HYDRODIURIL) 25 MG tablet Take 1 tablet (25 mg total) by mouth once daily. 90 tablet 11    irbesartan (AVAPRO) 300 MG tablet Take 1 tablet (300 mg total) by mouth every evening. 90 tablet 12    ketoconazole (NIZORAL) 2 % shampoo Apply topically twice a week. 500 mL 12    predniSONE (DELTASONE) 5 MG tablet Take 1 tablet (5 mg total) by mouth once daily. Take as directed with water on an empty stomach. 30 tablet 5    tadalafiL (CIALIS) 20 MG Tab Take 1 tablet (20 mg total) by mouth every other day. Take 1 hour before intercourse 20 tablet 11    traZODone (DESYREL) 50 MG tablet Take 1 tablet (50 mg total) by mouth nightly as needed for Insomnia. 90 tablet 1     Current Facility-Administered Medications   Medication Dose Route Frequency Provider Last Rate Last Admin    alprostadiL (PROSTIN VR) 50 mcg, phentolamine (REGITINE) 1 mg, papaverine 0.03 mg / 5 mL  0.1 mL Intracavernosal 1 time in Clinic/HOD Dale James MD         "PHENYLephrine injection 800 mcg  800 mcg Intracavernosal Once Dale James MD            Objective:      Physical Exam:   /79 (BP Location: Left arm, Patient Position: Sitting)   Pulse (!) 59   Temp 98 °F (36.7 °C) (Temporal)   Resp 18   Ht 6' 2" (1.88 m)   Wt 113 kg (249 lb 1.9 oz)   SpO2 98%   BMI 31.99 kg/m²       ECOG Performance status: (0) Fully active, able to carry on all predisease performance without restriction     Physical Exam  Constitutional:       General: He is not in acute distress.     Appearance: Normal appearance.   HENT:      Head: Normocephalic.   Eyes:      General: No scleral icterus.     Extraocular Movements: Extraocular movements intact.      Conjunctiva/sclera: Conjunctivae normal.   Cardiovascular:      Rate and Rhythm: Normal rate.   Pulmonary:      Effort: Pulmonary effort is normal. No respiratory distress.   Abdominal:      General: There is no distension.      Palpations: Abdomen is soft.   Skin:     General: Skin is warm and dry.   Neurological:      Mental Status: He is alert and oriented to person, place, and time.      Motor: No weakness.   Psychiatric:         Mood and Affect: Mood normal.         Behavior: Behavior normal.         Thought Content: Thought content normal.          Recent Labs:   Lab Results   Component Value Date    WBC 5.46 03/28/2024    RBC 5.28 03/28/2024    HGB 15.3 03/28/2024    HCT 46.6 03/28/2024     03/28/2024     03/28/2024    K 4.5 03/28/2024     03/28/2024    CO2 26 03/28/2024    GLU 71 03/28/2024    BUN 11 03/28/2024    CREATININE 1.0 03/28/2024    CALCIUM 10.0 03/28/2024    BILITOT 0.6 03/28/2024    AST 28 03/28/2024    ALT 25 03/28/2024          Lab Results   Component Value Date    PSADIAG 0.84 01/13/2025    PSADIAG 1.0 10/24/2024    PSADIAG 0.73 03/28/2024    PSADIAG 0.05 08/02/2022    PSADIAG 0.05 05/04/2022    PSADIAG <0.01 12/14/2021    PSADIAG <0.01 09/09/2021    PSADIAG <0.01 06/16/2021    PSADIAG 0.01 " 02/25/2021    PSA 5.1 (H) 06/26/2020    PSA 3.9 04/11/2018    PSA 3.1 04/11/2017    PSA 2.6 11/02/2015    PSA 2.0 03/10/2014    PSA 1.5 03/23/2006        Cardiovascular Screening:  Primary care physician: Prashanth Torres MD      The 10-year ASCVD risk score (Geoffrey IBARRA, et al., 2019) is: 12.9%    Values used to calculate the score:      Age: 59 years      Sex: Male      Is Non- : Yes      Diabetic: No      Tobacco smoker: No      Systolic Blood Pressure: 129 mmHg      Is BP treated: Yes      HDL Cholesterol: 45 mg/dL      Total Cholesterol: 226 mg/dL    EKG:   Results for orders placed or performed during the hospital encounter of 11/06/20   EKG 12-lead    Collection Time: 11/06/20  9:29 AM    Narrative    Test Reason : PRE-OP    Vent. Rate : 054 BPM     Atrial Rate : 054 BPM     P-R Int : 156 ms          QRS Dur : 098 ms      QT Int : 418 ms       P-R-T Axes : 023 068 045 degrees     QTc Int : 396 ms    Sinus bradycardia  Otherwise normal ECG    Confirmed by Shane ESCALANTE, Tao OSMAN ([id number]) on 11/7/2020 12:56:59 PM    Referred By:             Confirmed By:Tao Lynn MD       Body mass index is 31.99 kg/m².    Lab Results   Component Value Date    CHOL 226 (H) 03/28/2024    LDLCALC 170.4 (H) 03/28/2024    HDL 45 03/28/2024    TRIG 53 03/28/2024    HGBA1C 5.4 08/20/2021          Bone Health    Lab Results   Component Value Date    [id number] 23 (L) 03/28/2024        No results found for this or any previous visit.         PSMA PET IMAGING+     No results found for this or any previous visit.       I have personally reviewed the above imaging.     Path:   Reviewed pathology as documented above.      Diagnoses:     1. Prostate cancer    2. Long term current use of therapeutic drug    3. Androgen deprivation therapy          Assessment and Plan:     1. Prostate cancer  Overview:  Biochemical and pelvic berta relapse of high risk prostate cancer. Initially underwent radical  prostatectomy 01/20/2021 with pT3bN0 disease. PSA malcolm was <0.01. Had Rising PSA as of 05/2022, up to 1.0 10/2024. PSMA PET CT 01/2025 with tracer avid chuy-rectal soft tissue lesion c/f berta relapse.    Assessment & Plan:  PSMA PET with somewhat equivocal perirectal PMSA avid node. Favor treating agressively as pelvic berta relapse given his young age and otherwise good health. Plan for 2 years ADT + AAP with concurrent RT.  - Start ADT + /5  - First Lupron next week  - Start AAP on day of Lupron  - Can stop bicalutamide when starts AAP  - Safety labs q2 weeks x 2 months  - FU in clinic 4 weeks after start  - DEXA; Ca/D  - Genetics referral    Orders:  -     Ambulatory referral/consult to Hematology / Oncology  -     cholecalciferol, vitamin D3, (VITAMIN D3) 25 mcg (1,000 unit) capsule; Take 1 capsule (1,000 Units total) by mouth once daily.  Dispense: 30 capsule; Refill: 11  -     Comprehensive Metabolic Panel; Standing  -     CBC Oncology; Standing  -     DXA Bone Density Axial Skeleton 1 or more sites; Future; Expected date: 02/06/2025  -     Prostate Specific Antigen, Diagnostic; Standing  -     Testosterone; Standing  -     Vitamin D; Future; Expected date: 02/06/2025  -     Physician communication order; Standing  -     Physician communication order; Standing  -     Physician communication order; Standing  -     abiraterone (ZYTIGA) 250 mg Tab; Take 1 tablet (250 mg total) by mouth once daily Take with a low fat meal.  Dispense: 30 tablet; Refill: 11  -     predniSONE (DELTASONE) 5 MG tablet; Take 1 tablet (5 mg total) by mouth once daily. Take as directed with water on an empty stomach.  Dispense: 30 tablet; Refill: 5  -     Ambulatory referral/consult to Genetics; Future; Expected date: 02/13/2025    2. Long term current use of therapeutic drug  -     cholecalciferol, vitamin D3, (VITAMIN D3) 25 mcg (1,000 unit) capsule; Take 1 capsule (1,000 Units total) by mouth once daily.  Dispense: 30 capsule;  Refill: 11  -     DXA Bone Density Axial Skeleton 1 or more sites; Future; Expected date: 02/06/2025  -     Vitamin D; Future; Expected date: 02/06/2025    3. Androgen deprivation therapy  -     cholecalciferol, vitamin D3, (VITAMIN D3) 25 mcg (1,000 unit) capsule; Take 1 capsule (1,000 Units total) by mouth once daily.  Dispense: 30 capsule; Refill: 11  -     DXA Bone Density Axial Skeleton 1 or more sites; Future; Expected date: 02/06/2025  -     Vitamin D; Future; Expected date: 02/06/2025          Follow up:   Route Chart for Scheduling    Med Onc Chart Routing      Follow up with physician . 5 weeks (or 4 weeks after Lupron start)   Follow up with MANASA 1 week.   Infusion scheduling note    Injection scheduling note Lupron 1 week   Labs CBC, CMP and PSA   Scheduling:  Preferred lab:  Lab interval:  CMP every 2 weeks. Full labs with clinic visit   Imaging DXA scan      Pharmacy appointment    Other referrals                   Treatment Plan Information   OP ABIRATERONE DAILY PREDNISONE BID Andrew Chin MD   Associated diagnosis: Prostate cancer Stage IIIB pT3b, pN0, cM0, PSA: 5.1, Grade Group: 2 noted on 11/11/2020   Line of treatment: First Line  Treatment Goal: Curative     Upcoming Treatment Dates - OP ABIRATERONE DAILY PREDNISONE BID    No upcoming days in selected categories.    Supportive Plan Information  OP PROSTATE LEUPROLIDE Q3MO Andrew Chin MD   Associated Diagnosis: Prostate cancer Stage IIIB pT3b, pN0, cM0, PSA: 5.1, Grade Group: 2 noted on 11/11/2020   Line of treatment: First Line   Treatment goal: Curative     Upcoming Treatment Dates - OP PROSTATE LEUPROLIDE Q3MO    2/13/2025       Chemotherapy       leuprolide (LUPRON) injection 22.5 mg  5/8/2025       Chemotherapy       leuprolide (LUPRON) injection 22.5 mg  7/31/2025       Chemotherapy       leuprolide (LUPRON) injection 22.5 mg  10/23/2025       Chemotherapy       leuprolide (LUPRON) injection 22.5 mg         The above information  has been reviewed with the patient and all questions have been answered to their apparent satisfaction.  They understand that they can call the clinic with any questions.    Andrew Chin MD MPH  Staff Physician     [facility] [address] [address]  Email: [email or web address]  Phone: o) [phone] (c) [phone]

## 2025-02-06 NOTE — PATIENT INSTRUCTIONS
We discussed your recent diagnosis of recurrent prostate cancer after surgery. This is based off an elevated PSA and PSMA PET imaging suspicious for recurrence in pelvic lymph node. Recommend curative intent salvage therapy along with 2 years of hormonal therapy with testosterone lowering shots (ADT or 'hormone shots') along with abiraterone and prednisone pills.    We discussed typical side effects of hormone therapy including fatigue, weight gain, loss libido, forgetfulness, hot flashes, and muscle loss. Other longer term risks can include increased risk of cardiovascular disease and osteoporosis.      Please start taking vitamin D 1000 IU daily; I called a prescription into your local pharmacy. Please get 4 servings of calcium daily; if you cannot get 4 servings of calcium you can take a supplement. We will schedule a bone mineral density test    We will schedule you an appointment with our genetics counseling clinic to discuss screening for the presence of a high risk cancer gene    - Prescription for abiraterone 250mg by mouth daily with a low fat meal and prednisone 5mg by mouth daily called into [facility]; they will contact you to arrange delivery in the next few days  - Plan to start first Lupron shot in 1-2 weeks  - Wait to start abiraterone and prednisone until you see us in clinic for your Lupron shot  - We will need to check your labs every two weeks for 2 months after starting abiraterone. Afterwards we can space out how frequently we check your labs  - Finish out current course of bicalutamide    Excellent patient resources for living with prostate cancer can be found at [email or web address]

## 2025-02-06 NOTE — ASSESSMENT & PLAN NOTE
PSMA PET with somewhat equivocal perirectal PMSA avid node. Favor treating agressively as pelvic berta relapse given his young age and otherwise good health. Plan for 2 years ADT + AAP with concurrent RT.  - Start ADT + /5  - First Lupron next week  - Start AAP on day of Lupron  - Can stop bicalutamide when starts AAP  - Safety labs q2 weeks x 2 months  - FU in clinic 4 weeks after start  - DEXA; Ca/D  - Genetics referral

## 2025-02-06 NOTE — PROGRESS NOTES
MEDICAL ONCOLOGY - NEW PATIENT VISIT    Reason for visit: Prostate Cancer     Best Contact Phone Number(s): [phone] (home)      Cancer/Stage/TNM:    Cancer Staging   Prostate cancer  Staging form: Prostate, AJCC 8th Edition  - Pathologic stage from 10/24/2024: Stage IIIB (pT3b, pN0, cM0, PSA: 5.1, Grade Group: 2) - Signed by Antoine Warner Jr., MD on 10/24/2024       Oncology History   Prostate cancer   11/11/2020 Initial Diagnosis    Prostate cancer     10/24/2024 Cancer Staged    Staging form: Prostate, AJCC 8th Edition  - Pathologic stage from 10/24/2024: Stage IIIB (pT3b, pN0, cM0, PSA: 5.1, Grade Group: 2)     2/6/2025 -  Chemotherapy    Treatment Summary   Plan Name: OP ABIRATERONE DAILY PREDNISONE BID  Treatment Goal: Curative  Status: Active  Start Date: 2/6/2025  End Date: 2/6/2025  Provider: Andrew Chin MD  Chemotherapy: abiraterone (ZYTIGA) 250 mg Tab, 250 mg, Oral, Daily, 1 of 1 cycle, Start date: 2/6/2025, End date: 2/6/2026          HPI:   59M with pathologic stage IIIB (T3b, N0, M0, R1, GG2, PSA < 10) prostate cancer. He initially presented in 2020 with an elevated PSA of 5.1 ng/ml. Biopsies revealed [place] 6 (3+3) involving 6/6 cores from the Lt. lobe of the prostate. He subsequently underwent RALP with bilateral node dissection on 1/20/21. Pathology revealed a 57 g prostate measuring 5.5 x 4 x 3.5 cm . There was [place] 7 (3+4) adenocarcinoma involving 10% of the prostate. The Paolo pattern 4 accounted for 20% of the tumor. There was involvement of the Lt. seminal vesicle and margin at the apex. There was no extraprostatic extension or bladder neck or lymphovascular invasion. There was perineural invasion. Eleven lymph nodes (10 Rt., 1 Lt.) were negative. Postoperative PSA remained < 0.01 ng/ml until May of 2022 when it returned at 0.05 ng/ml. Repeat PSA in March of 2024 was 0.73 ng/ml. The patient's wife works in [employer] [facility] and he spends half the year there. Repeat PSA at the [facility]  in [place] on 9/24/24 was 1.01 ng/ml. PSA 1/13/2025 was 0.84.    PSMA scan 01/08/2025 showed tracer avid soft tissue lesion, possible lymph node or inflammation, in the pelvis adjacent to the left side of the rectum.  Was seen by Dr. Warner of radiation oncology on 1/13/25 who discussed salvage radiation. He was started on Casodex. Will see Dr. Warner again in about 4 months to start RT.      Subjective:    2 nocturnal awakenings to urinate. Occasional small leakage, wears small pad. Has penile implant due to erectile issues after total prostatectomy and sling operation. No difficulty with urinary retention. No other urinary symptoms. No new back pain, weakness, hot flashes, fevers, chills.       ROS:   Review of Systems   Constitutional:  Negative for chills, fever and weight loss.   HENT:  Negative for nosebleeds and sore throat.    Eyes:  Negative for blurred vision and double vision.   Respiratory:  Negative for cough and shortness of breath.    Cardiovascular:  Negative for chest pain and palpitations.   Gastrointestinal:  Negative for abdominal pain, nausea and vomiting.   Genitourinary:  Negative for dysuria, frequency and urgency.   Musculoskeletal:  Negative for back pain and neck pain.   Neurological:  Negative for dizziness and headaches.   Psychiatric/Behavioral:  Negative for depression. The patient does not have insomnia.        Past Medical History:   Past Medical History:   Diagnosis Date    History of colonic polyps 04/11/2017    HTN (hypertension) 03/07/2014    Hyperlipidemia 03/07/2014    patient states he does not take medication    Prostate cancer         Past Surgical History:   Past Surgical History:   Procedure Laterality Date    COLONOSCOPY N/A 12/11/2015    Procedure: COLONOSCOPY;  Surgeon: Balta Farah MD;  Location: [facility];  Service: Endoscopy;  Laterality: N/A;    COLONOSCOPY N/A 3/10/2022    Procedure: COLONOSCOPY;  Surgeon: Natalia Mcqueen MD;  Location: [facility];  Service:  Endoscopy;  Laterality: N/A;  1/26 covid test 3/7 @ [place]; instructions to portal-st    CYSTOSCOPY WITH URODYNAMIC TESTING N/A 11/28/2022    Procedure: CYSTOSCOPY, WITH URODYNAMIC TESTING;  Surgeon: Jason Castaneda MD;  Location: [facility] OR 1ST FLR;  Service: Urology;  Laterality: N/A;  1hr    INSERTION OF SLING FOR MALE URINARY INCONTINENCE N/A 10/18/2022    Procedure: INSERTION, BLADDER SLING, MALE, FOR URINARY INCONTINENCE;  Surgeon: Jason Castaneda MD;  Location: [facility] OR 2ND FLR;  Service: Urology;  Laterality: N/A;    PENILE PROSTHESIS IMPLANT N/A 10/18/2022    Procedure: INSERTION, PENILE PROSTHESIS;  Surgeon: Td El MD;  Location: [facility] OR 2ND FLR;  Service: Urology;  Laterality: N/A;  1.5  hours    PROSTATE BIOPSY      ROBOT-ASSISTED LAPAROSCOPIC PELVIC LYMPHADENECTOMY USING DA ERNIE XI Bilateral 1/20/2021    Procedure: XI ROBOTIC LYMPHADENECTOMY, PELVIC;  Surgeon: BOOGIE Hammond MD;  Location: [facility];  Service: Urology;  Laterality: Bilateral;    ROBOT-ASSISTED LAPAROSCOPIC PROSTATECTOMY USING DA ERNIE XI N/A 1/20/2021    Procedure: XI ROBOTIC PROSTATECTOMY;  Surgeon: BOOGIE Hammond MD;  Location: [facility];  Service: Urology;  Laterality: N/A;    trus/bx 2020      TUMOR REMOVAL      back of head        Family History:   Family History   Problem Relation Name Age of Onset    No Known Problems Mother      Hypertension Father          Social History:   Social History     Tobacco Use    Smoking status: Never    Smokeless tobacco: Never   Substance Use Topics    Alcohol use: No        I have reviewed and updated the patient's past medical, surgical, family and social histories.    Allergies:   Review of patient's allergies indicates:  No Known Allergies     Medications:   Current Outpatient Medications   Medication Sig Dispense Refill    abiraterone (ZYTIGA) 250 mg Tab Take 1 tablet (250 mg total) by mouth once daily Take with a low fat meal. 30 tablet 11    amLODIPine (NORVASC) 10 MG  "tablet Take 1 tablet (10 mg total) by mouth once daily. 90 tablet 12    bicalutamide (CASODEX) 50 MG Tab Take 1 tablet (50 mg total) by mouth once daily. 30 tablet 3    cholecalciferol, vitamin D3, (VITAMIN D3) 25 mcg (1,000 unit) capsule Take 1 capsule (1,000 Units total) by mouth once daily. 30 capsule 11    hydroCHLOROthiazide (HYDRODIURIL) 25 MG tablet Take 1 tablet (25 mg total) by mouth once daily. 90 tablet 11    irbesartan (AVAPRO) 300 MG tablet Take 1 tablet (300 mg total) by mouth every evening. 90 tablet 12    ketoconazole (NIZORAL) 2 % shampoo Apply topically twice a week. 500 mL 12    predniSONE (DELTASONE) 5 MG tablet Take 1 tablet (5 mg total) by mouth once daily. Take as directed with water on an empty stomach. 30 tablet 5    tadalafiL (CIALIS) 20 MG Tab Take 1 tablet (20 mg total) by mouth every other day. Take 1 hour before intercourse 20 tablet 11    traZODone (DESYREL) 50 MG tablet Take 1 tablet (50 mg total) by mouth nightly as needed for Insomnia. 90 tablet 1     Current Facility-Administered Medications   Medication Dose Route Frequency Provider Last Rate Last Admin    alprostadiL (PROSTIN VR) 50 mcg, phentolamine (REGITINE) 1 mg, papaverine 0.03 mg / 5 mL  0.1 mL Intracavernosal 1 time in Clinic/HOD Dale James MD        PHENYLephrine injection 800 mcg  800 mcg Intracavernosal Once Dale James MD            Physical Exam:   /79 (BP Location: Left arm, Patient Position: Sitting)   Pulse (!) 59   Temp 98 °F (36.7 °C) (Temporal)   Resp 18   Ht 6' 2" (1.88 m)   Wt 113 kg (249 lb 1.9 oz)   SpO2 98%   BMI 31.99 kg/m²                Physical Exam  Constitutional:       Appearance: Normal appearance.   HENT:      Head: Normocephalic and atraumatic.   Eyes:      Extraocular Movements: Extraocular movements intact.      Pupils: Pupils are equal, round, and reactive to light.   Cardiovascular:      Rate and Rhythm: Normal rate and regular rhythm.   Pulmonary:      Effort: " Pulmonary effort is normal.      Breath sounds: Normal breath sounds.   Abdominal:      General: Abdomen is flat.      Palpations: Abdomen is soft.   Musculoskeletal:         General: No swelling. Normal range of motion.      Cervical back: Normal range of motion and neck supple.   Skin:     General: Skin is warm and dry.   Neurological:      General: No focal deficit present.      Mental Status: He is alert and oriented to person, place, and time.   Psychiatric:         Mood and Affect: Mood normal.         Behavior: Behavior normal.           Labs:   No results found for this or any previous visit (from the past 48 hours).     Imaging:    EXAMINATION:  NM PET CT GA68 PSMA, MIDTHIGH TO VERTEX 01/08/2025     CLINICAL HISTORY:  Malignant neoplasm of prostate     59-year-old male with history of stage III B prostate adenocarcinoma (diagnosed 09/14/2020) status post laparoscopic prostatectomy 01/20/2021.     PSA 1.0 (10/24/2024).     TECHNIQUE:  Approximately 60 minutes following the intravenous injection of 7.41 mCi Ga-68 gozetotide (Ga-68 PSMA-11), PET images were acquired from the proximal thighs to the vertex.  CT images were also acquired for attenuation correction and anatomic localization and performed without oral or IV contrast.     COMPARISON:  CT pelvis 03/15/2021     FINDINGS:  In the head and neck, there is physiologic uptake in the lacrimal and salivary glands, and there are no tracer avid lesions suspicious for malignancy.     In the chest, there are no tracer avid lesions suspicious for malignancy.     In the abdomen and pelvis, there is a soft tissue density with increased radiotracer uptake adjacent to the left side of the rectum measuring 1.5 x 1.0 cm, SUV max 4.6 (axial image 262), possibly representing pelvic lymph node.  There is physiologic distribution in the liver, spleen, bowel, and genitourinary tract.  Postoperative changes from previous prostatectomy.     There is nonspecific uptake in the  inguinal lymph nodes.     In the bones, there is physiologic uptake in the bone marrow, and there are no tracer avid lesions suspicious for malignancy.     Additional CT findings: Mild calcific atherosclerosis of the coronary arteries.  Right lung middle lobe nodule measuring 0.5 x 0.5 cm (axial image 144), as well as 2 left lung micro nodules, all of which are too small to characterize by PET.  Penile implant in place with fluid reservoir in left lower quadrant and pump bulb in scrotum.     Impression:     Postoperative changes with no tracer avid foci in the prostate bed .     Tracer avid soft tissue lesion, possible lymph node or inflammation, in the pelvis adjacent to the left side of the rectum.  Attention to follow    Path:   Reviewed      Assessment:       1. Prostate cancer    2. Long term current use of therapeutic drug    3. Androgen deprivation therapy          Plan:             # Prostate Cancer (3+4) with PSA Relapse  Pathologic stage IIIB (T3b, N0, M0, R1, GG2, PSA < 10) prostate cancer. He subsequently underwent RALP with bilateral node dissection on 1/20/21. [place] 7 (3+4) adenocarcinoma involving 10% of the prostate. The Paolo pattern 4 accounted for 20% of the tumor. Now with PSA relapse. PSMA scan 01/08/2025 showed tracer avid soft tissue lesion, possible lymph node or inflammation, in the pelvis adjacent to the left side of the rectum. Was seen by Dr. Warner of radiation oncology on 1/13/25 who discussed salvage radiation. He was started on Casodex.   - Start ADT and Abiraterone + prednisone  - Stop Casodex after starting Tatiana/pred  - Lupron in 1-2 weeks, baseline labs at that time  - Follow up with radiation oncology  - DEXA  - Vitamin D  - Medical genetics referral discussed with patient and placed  - CMP 2 weeks after Lupron; full labs in 6 weeks and physician visit          Med Onc Chart Routing      Follow up with physician 6 weeks. With labs   Follow up with MANASA 2 weeks. With Lupron  and labs   Infusion scheduling note    Injection scheduling note Lupron in 1-2 weeks with labs; CMP 2 weeks after Lupron; full labs in 6 weeks   Labs CBC, CMP, vitamin D, PSA and other   Scheduling:  Preferred lab:  Lab interval:  Schedule same day as Lupron   Imaging DXA scan      Pharmacy appointment    Other referrals       Additional referrals needed  Genetics             The above information has been reviewed with the patient and all questions have been answered to their apparent satisfaction.  They understand that they can call the clinic with any questions.      Naresh Negrete DO  Hematology/Oncology Fellow, PGY-IV  [facility] [place]

## 2025-02-10 ENCOUNTER — PATIENT MESSAGE (OUTPATIENT)
Dept: HEMATOLOGY/ONCOLOGY | Facility: CLINIC | Age: 60
End: 2025-02-10
Payer: COMMERCIAL

## 2025-02-13 ENCOUNTER — LAB VISIT (OUTPATIENT)
Dept: LAB | Facility: HOSPITAL | Age: 60
End: 2025-02-13
Attending: HOSPITALIST
Payer: COMMERCIAL

## 2025-02-13 DIAGNOSIS — C61 PROSTATE CANCER: ICD-10-CM

## 2025-02-13 LAB
ALBUMIN SERPL BCP-MCNC: 4.3 G/DL (ref 3.5–5.2)
ALP SERPL-CCNC: 50 U/L (ref 40–150)
ALT SERPL W/O P-5'-P-CCNC: 33 U/L (ref 10–44)
ANION GAP SERPL CALC-SCNC: 7 MMOL/L (ref 8–16)
AST SERPL-CCNC: 39 U/L (ref 10–40)
BILIRUB SERPL-MCNC: 0.6 MG/DL (ref 0.1–1)
BUN SERPL-MCNC: 20 MG/DL (ref 6–20)
CALCIUM SERPL-MCNC: 9.6 MG/DL (ref 8.7–10.5)
CHLORIDE SERPL-SCNC: 103 MMOL/L (ref 95–110)
CO2 SERPL-SCNC: 28 MMOL/L (ref 23–29)
CREAT SERPL-MCNC: 1 MG/DL (ref 0.5–1.4)
EST. GFR  (NO RACE VARIABLE): >60 ML/MIN/1.73 M^2
GLUCOSE SERPL-MCNC: 93 MG/DL (ref 70–110)
POTASSIUM SERPL-SCNC: 3.7 MMOL/L (ref 3.5–5.1)
PROT SERPL-MCNC: 7.8 G/DL (ref 6–8.4)
SODIUM SERPL-SCNC: 138 MMOL/L (ref 136–145)

## 2025-02-13 PROCEDURE — 36415 COLL VENOUS BLD VENIPUNCTURE: CPT | Performed by: HOSPITALIST

## 2025-02-13 PROCEDURE — 80053 COMPREHEN METABOLIC PANEL: CPT | Performed by: HOSPITALIST

## 2025-02-17 ENCOUNTER — TELEPHONE (OUTPATIENT)
Dept: HEMATOLOGY/ONCOLOGY | Facility: CLINIC | Age: 60
End: 2025-02-17
Payer: COMMERCIAL

## 2025-02-17 ENCOUNTER — HOSPITAL ENCOUNTER (OUTPATIENT)
Dept: RADIOLOGY | Facility: CLINIC | Age: 60
Discharge: HOME | End: 2025-02-17
Attending: HOSPITALIST
Payer: COMMERCIAL

## 2025-02-17 DIAGNOSIS — C61 PROSTATE CANCER: ICD-10-CM

## 2025-02-17 DIAGNOSIS — Z79.818 ANDROGEN DEPRIVATION THERAPY: ICD-10-CM

## 2025-02-17 DIAGNOSIS — Z79.899 LONG TERM CURRENT USE OF THERAPEUTIC DRUG: ICD-10-CM

## 2025-02-17 PROCEDURE — 77080 DXA BONE DENSITY AXIAL: CPT | Mod: TC,PO

## 2025-02-17 NOTE — TELEPHONE ENCOUNTER
Called patient and left message to confirm the in office appointment on 2/24/25 and to complete the genetic questionnaire

## 2025-02-24 ENCOUNTER — OFFICE VISIT (OUTPATIENT)
Dept: HEMATOLOGY/ONCOLOGY | Facility: CLINIC | Age: 60
End: 2025-02-24
Payer: COMMERCIAL

## 2025-02-24 ENCOUNTER — LAB VISIT (OUTPATIENT)
Dept: LAB | Facility: HOSPITAL | Age: 60
End: 2025-02-24
Attending: HOSPITALIST
Payer: COMMERCIAL

## 2025-02-24 ENCOUNTER — INFUSION (OUTPATIENT)
Dept: INFUSION THERAPY | Facility: HOSPITAL | Age: 60
End: 2025-02-24
Payer: COMMERCIAL

## 2025-02-24 VITALS
TEMPERATURE: 98 F | HEIGHT: 74 IN | BODY MASS INDEX: 33.27 KG/M2 | SYSTOLIC BLOOD PRESSURE: 144 MMHG | DIASTOLIC BLOOD PRESSURE: 82 MMHG | RESPIRATION RATE: 18 BRPM | OXYGEN SATURATION: 98 % | WEIGHT: 259.25 LBS | HEART RATE: 70 BPM

## 2025-02-24 DIAGNOSIS — Z79.899 LONG TERM CURRENT USE OF THERAPEUTIC DRUG: ICD-10-CM

## 2025-02-24 DIAGNOSIS — C61 PROSTATE CANCER: Primary | ICD-10-CM

## 2025-02-24 DIAGNOSIS — Z79.818 ANDROGEN DEPRIVATION THERAPY: ICD-10-CM

## 2025-02-24 DIAGNOSIS — I10 PRIMARY HYPERTENSION: ICD-10-CM

## 2025-02-24 DIAGNOSIS — Z80.42 FAMILY HISTORY OF PROSTATE CANCER: ICD-10-CM

## 2025-02-24 DIAGNOSIS — C61 PROSTATE CANCER: ICD-10-CM

## 2025-02-24 DIAGNOSIS — T38.7X5A ANDROGEN-INDUCED OSTEOPOROSIS: ICD-10-CM

## 2025-02-24 DIAGNOSIS — M81.8 ANDROGEN-INDUCED OSTEOPOROSIS: ICD-10-CM

## 2025-02-24 DIAGNOSIS — Z80.42 FAMILY HISTORY OF PROSTATE CANCER: Primary | ICD-10-CM

## 2025-02-24 LAB
25(OH)D3+25(OH)D2 SERPL-MCNC: 24 NG/ML (ref 30–96)
COMPLEXED PSA SERPL-MCNC: 0.23 NG/ML (ref 0–4)
MISCELLANEOUS GENETIC TEST NAME: NORMAL
TESTOST SERPL-MCNC: 1036 NG/DL (ref 304–1227)

## 2025-02-24 PROCEDURE — 1159F MED LIST DOCD IN RCRD: CPT | Mod: CPTII,S$GLB,, | Performed by: NURSE PRACTITIONER

## 2025-02-24 PROCEDURE — 4010F ACE/ARB THERAPY RXD/TAKEN: CPT | Mod: CPTII,S$GLB,, | Performed by: NURSE PRACTITIONER

## 2025-02-24 PROCEDURE — 3077F SYST BP >= 140 MM HG: CPT | Mod: CPTII,S$GLB,, | Performed by: NURSE PRACTITIONER

## 2025-02-24 PROCEDURE — 99999 PR PBB SHADOW E&M-EST. PATIENT-LVL IV: CPT | Mod: PBBFAC,,, | Performed by: NURSE PRACTITIONER

## 2025-02-24 PROCEDURE — 99215 OFFICE O/P EST HI 40 MIN: CPT | Mod: S$GLB,,, | Performed by: NURSE PRACTITIONER

## 2025-02-24 PROCEDURE — 63600175 PHARM REV CODE 636 W HCPCS: Mod: JZ,TB | Performed by: NURSE PRACTITIONER

## 2025-02-24 PROCEDURE — 99999 PR PBB SHADOW E&M-EST. PATIENT-LVL III: CPT | Mod: PBBFAC,,, | Performed by: GENETIC COUNSELOR, MS

## 2025-02-24 PROCEDURE — 3008F BODY MASS INDEX DOCD: CPT | Mod: CPTII,S$GLB,, | Performed by: NURSE PRACTITIONER

## 2025-02-24 PROCEDURE — 96041 GENETIC COUNSELING SVC EA 30: CPT | Mod: S$GLB,,, | Performed by: GENETIC COUNSELOR, MS

## 2025-02-24 PROCEDURE — 82306 VITAMIN D 25 HYDROXY: CPT | Performed by: HOSPITALIST

## 2025-02-24 PROCEDURE — G2211 COMPLEX E/M VISIT ADD ON: HCPCS | Mod: S$GLB,,, | Performed by: NURSE PRACTITIONER

## 2025-02-24 PROCEDURE — 96402 CHEMO HORMON ANTINEOPL SQ/IM: CPT

## 2025-02-24 PROCEDURE — 84403 ASSAY OF TOTAL TESTOSTERONE: CPT | Performed by: HOSPITALIST

## 2025-02-24 PROCEDURE — 84153 ASSAY OF PSA TOTAL: CPT | Performed by: HOSPITALIST

## 2025-02-24 PROCEDURE — 3079F DIAST BP 80-89 MM HG: CPT | Mod: CPTII,S$GLB,, | Performed by: NURSE PRACTITIONER

## 2025-02-24 RX ORDER — CALCIUM CARBONATE 200(500)MG
2 TABLET,CHEWABLE ORAL DAILY
Qty: 180 TABLET | Refills: 3 | Status: SHIPPED | OUTPATIENT
Start: 2025-02-24 | End: 2026-02-24

## 2025-02-24 RX ADMIN — LEUPROLIDE ACETATE 22.5 MG: KIT at 02:02

## 2025-02-24 NOTE — ASSESSMENT & PLAN NOTE
- stopped bicalutamide  - leuprolide injection today, next due 5/24/25  - start abiraterone/prednisone regimen today  - PSA responding, 0.23 today

## 2025-02-24 NOTE — NURSING
Pt given IM Lupron injection to left buttock. Tolerated well. Care explained, all questions answered.

## 2025-02-24 NOTE — PROGRESS NOTES
"Cancer Genetics  Hereditary and High-Risk Clinic  Department of Hematology and Oncology  [facility]    [facility]    Date of Service:  25  Visit Provider:  Libra Moya, [facility] [place]    Patient ID  Name: Chapo Ohara    : 1965    MRN: [id number]      Referring Provider  Andrew Chin MD  [address] [place] [address]    IMPRESSION      Chapo is a pleasant 60yo male with a diagnosis of high risk prostate cancer and family history of prostate cancer, for which he meets NCCN criteria for genetic testing. His family history is not extremely suspicious of a hereditary cause, but there is a significant amount of information missing for relatives on both side of the family. Therefore, a sample was submitted on 25 to [employer] for CancerNext +RNAinsight panel testing.    FOCUSED PERSONAL HISTORY     Chief Complaint: Genetic Evaluation (Personal and Family History of Prostate Cancer)    History of Present Illness (HPI):  Chapo Ohara ("Chapo"), 59 y.o., assigned male sex at birth, is established with the [facility] but new to me.  He was referred by Dr. Chin with Medical Oncology for hereditary cancer risk assessment given his diagnosis of high-risk prostate cancer in 2020 at 56yo, for which he underwent prostatectomy on 21 (dR2gbA0).  More recently, he was found to have a berta relapse and is beginning treatment with abiraterone and Lupron.     -NM PET/CT on 25:  Postoperative changes with no tracer avid foci in the prostate bed .  Tracer avid soft tissue lesion, possible lymph node or inflammation, in the pelvis adjacent to the left side of the rectum.  Attention to follow-up.    Tobacco Use  Tobacco Use: Low Risk  (2025)    Patient History     Smoking Tobacco Use: Never     Smokeless Tobacco Use: Never     Passive Exposure: Not on file     Review of Systems   Patient's Distress Score today was 0/10 (with " 10 being the worst).      FAMILY HISTORY         Chapo reported his family history of cancer as follows:  Father: diagnosed with prostate cancer in his 50s and  in his 70s due to unrelated causes  Paternal half-brother: 53yo who was diagnosed with prostate cancer at 47yo; no genetic testing    Mother: diagnosed with cancer and  at 63yo, but could not recall the primary cancer site/type (just that it was a rare type of cancer that was found after a mass/lump was found on her face by her under ear/jaw area    A family history of birth defects, intellectual disability, SIDS, sudden early death, multiple miscarriages and consanguinity were denied. Please refer to above pedigree for further details. A larger copy is available for review in the Media tab.     DISCUSSION     Approximately 5-10% of prostate cancers are due to hereditary causes, with high-risk and metastatic prostate cancers having a higher likelihood of a hereditary cause. Hereditary prostate cancer can be caused by mutations in genes such as LAURE, BRCA1, BRCA2, CHEK2, HOXB13, PALB2 and others. Chapo meets NCCN criteria for genetic testing based on his own diagnosis of high-risk prostate cancer. He also has a family history of prostate and an unknown type of cancer. Therefore, he was offered phenotype-driven and broad panel testing. Chapo opted for the CancerNext +Dataloop.IO panel through [employer] of the following 36 genes associated with hereditary breast, gastrointestinal, gynecologic, kidney, pancreatic, prostate, skin and other cancers:    APC, LAURE, AXIN2, BAP1, BARD1, BMPR1A, BRCA1, BRCA2, BRIP1, CDH1, CDKN2A, CHEK2, EPCAM, FH, FLCN, GREM1, HOXB13, MBD4, MET, MLH1, MSH2, MSH3, MSH6, MUTYH, NF1, NTHL1, PALB2, PMS2, POLD1, POLE, PTEN, RAD51C, RAD51D, SMAD4, STK11, TP53, TSC1, TSC2, VHL     We reviewed that mutations in the highly penetrant genes put an individual at a significantly increased risk of prostate and/or other cancers.  There  are established screening and surgery guidelines for these syndromes. Mutations in the moderately penetrant genes increase the risk of prostate and other cancers, but less is understood regarding their role in cancer risk. There may not be standard screening or management guidelines for individuals who have mutations in these genes. We discussed the autosomal dominant inheritance of most of these conditions, and that if Chapo tests positive for a mutation in one of these genes, then there would be a 50% chance his first degree relatives (parents, siblings, children) could also have inherited the same mutation. Individuals in his family could consider undergoing predictive genetic testing at an appropriate age to determine their mutation status and their lifetime risk of cancer. Furthermore, we discussed the psychosocial implications of a positive result, including anxiety, fear and guilt if a mutation is passed on to a child. Chapo did not express concern.    The potential outcomes of testing, including the high VUS (variant of unknown significance) rate seen in panel testing, were reviewed and implications were discussed. There is also a possibility for the patient to incur out-of-pocket costs related to this testing. Issues regarding insurance discrimination were discussed. [place] protects against employment and health insurance discrimination, but it does not apply to life insurance, long term care or disability policies. It also does not apply to  personnel or employers with less than 15 employees. Chapo appeared to have a good understanding of the information as he asked appropriate questions.  A sample was submitted on 2/24/25 to [employer].  Chapo's results should be available in approximately 3 weeks.  In the meantime, he is welcome to contact me if he has any questions, concerns, or updates to his family history.     Chapo received comprehensive counseling regarding panel testing and has  "elected to proceed with this testing.     ASSESSMENT / PLAN      Chapo Ohara ("Chapo"), 59 y.o., presented today for hereditary cancer risk assessment and genetic counseling given his diagnosis of high risk prostate cancer and family history of prostate cancer, for which he meets NCCN criteria for genetic testing. His family history is not extremely suspicious of a hereditary cause, but there is a significant amount of information missing for relatives on both side of the family. Therefore, a sample was submitted today for germline genetic testing, and I will call Chapo once his results are available. A follow-up genetic counseling appointment will be scheduled if results are positive for a pathogenic mutation.      1. Prostate cancer  - Ambulatory referral/consult to Genetics  - Genetic Misc Sendout Test, Blood; Future    2. Family history of prostate cancer  - Genetic Misc Sendout Test, Blood; Future       Genetic Test Information  Testing lab: [facility]   Test panel: CancerNext +RNAinsight  (Core:  BRCA1/BRCA2)   ICD-10 code(s): C61, Z80.42   Verbal informed consent: Obtained   Specimen type: Blood  (Patient denies blood disorders that would necessitate a skin fibroblast specimen)   Specimen collection by: [facility]   Specimen collection date: 02/24/2025   Results expected by: Approximately 2-3 weeks after the genetic testing lab receives the specimen   Results disclosure plan: Post-test visit if positive or complex result; otherwise, results will be communicated through phone call       Follow-up:  Follow up once genetic test results are available.    Questions were encouraged and answered to the patient's satisfaction, and he verbalized understanding of the information and agreement with the plan.         Approximately 30 minutes were spent face-to-face with the patient.  Approximately 50 minutes in total were spent on this encounter, which includes face-to-face time and non-face-to-face time " preparing to see the patient (e.g., review of tests), obtaining and/or reviewing separately obtained history, documenting clinical information in the electronic or other health record, independently interpreting results (not separately reported) and communicating results to the patient/family/caregiver, or care coordination (not separately reported).     This assessment is based on the history and reports provided, as well as the current scientific knowledge regarding cancer genetics.         Libra Moya, [facility] [place]  Senior Genetic Counselor, Hereditary and High-Risk Clinic  Department of Hematology and Oncology  [facility]    [facility]        CC:  Dr. Andrew Chin

## 2025-02-24 NOTE — PROGRESS NOTES
[facility]: New patient visit  Best Contact Phone Number(s): [phone] (home)       Cancer/Stage/TNM:    Cancer Staging   Prostate cancer  Staging form: Prostate, AJCC 8th Edition  - Pathologic stage from 10/24/2024: Stage IIIB (pT3b, pN0, cM0, PSA: 5.1, Grade Group: 2) - Signed by Antoine Warner Jr., MD on 10/24/2024        Reason for visit:  Pelvic berta relapse of of high risk prostate cancer    Molecular:  Germline Testing: N/A  Somatic Testing: N/A    Treatment History:   Bicalutamide start 1/13/25 - 2/13/25  Leuprolide q 3 months - start 2/24/25  Abiraterone + prednisone - start 2/24/25     HPI:   Chapo Ohara is a 59 y.o. male with biochemical and pelvic berta relapse of high risk prostate cancer. Initially underwent radical prostatectomy 01/20/2021 with pT3bN0 disease. PSA malcolm was <0.01. Had Rising PSA as of 05/2022, up to 1.0 10/2024. PSMA PET CT 01/2025 with tracer avid chuy-rectal soft tissue lesion c/f berta relapse. He presents to medical oncology clinic for initial evaluation.    Interval history:  Presents to clinic alone today for follow up of prostate cancer.  Current treatment plan includes both ADT + AAP.  Will receive first leuprolide injection and start AAP today.  No longer taking bicalutamide.  Reports no change energy level.  Denies hot flashes    Works as a [employer].    History has been obtained by chart review and discussion with the patient.     Oncology History   Prostate cancer   11/11/2020 Initial Diagnosis    Prostate cancer     1/20/2021 Surgery    01/20/21 Radical prostatectomy. Prostate adenocarcinoma. [place] 3+4. Positive surgical margin. No EPE. Left SV involved. Additional PNI. 0/10LN. pT3bN0     12/2021 Tumor Markers    12/14/21: PSA malcolm <0.01     8/2022 Tumor Markers    08/02/22: PSA 0.05     10/24/2024 Cancer Staged    Staging form: Prostate, AJCC 8th Edition  - Pathologic stage from 10/24/2024: Stage IIIB (pT3b, pN0, cM0, PSA: 5.1, Grade  Group: 2)     10/2024 Tumor Markers    10/24/24: PSA 1.0     1/8/2025 Imaging Significant Findings    01/08/25 PSMA PET CT  Impression:  - Postoperative changes with no tracer avid foci in the prostate bed .  - Tracer avid soft tissue lesion, possible lymph node or inflammation, in the pelvis adjacent to the left side of the rectum.  Attention to follow     2/6/2025 -  Chemotherapy    Treatment Summary   Plan Name: OP ABIRATERONE DAILY PREDNISONE BID  Treatment Goal: Curative  Status: Active  Start Date: 2/6/2025  End Date: 2/6/2025  Provider: Andrew Chin MD  Chemotherapy: abiraterone (ZYTIGA) 250 mg Tab, 250 mg, Oral, Daily, 1 of 1 cycle, Start date: 2/6/2025, End date: 2/6/2026           Past Medical History:   Diagnosis Date    History of colonic polyps 04/11/2017    HTN (hypertension) 03/07/2014    Hyperlipidemia 03/07/2014    patient states he does not take medication    Prostate cancer          Past Surgical History:   Procedure Laterality Date    COLONOSCOPY N/A 12/11/2015    Procedure: COLONOSCOPY;  Surgeon: Balta Farah MD;  Location: [facility] ENDO;  Service: Endoscopy;  Laterality: N/A;    COLONOSCOPY N/A 3/10/2022    Procedure: COLONOSCOPY;  Surgeon: Natalia Mcqueen MD;  Location: [facility] ENDO;  Service: Endoscopy;  Laterality: N/A;  1/26 covid test 3/7 @ [place]; instructions to portal-st    CYSTOSCOPY WITH URODYNAMIC TESTING N/A 11/28/2022    Procedure: CYSTOSCOPY, WITH URODYNAMIC TESTING;  Surgeon: Jason Castaneda MD;  Location: [facility] OR [facility];  Service: Urology;  Laterality: N/A;  1hr    INSERTION OF SLING FOR MALE URINARY INCONTINENCE N/A 10/18/2022    Procedure: INSERTION, BLADDER SLING, MALE, FOR URINARY INCONTINENCE;  Surgeon: Jason Castaneda MD;  Location: [facility] OR 2ND FLR;  Service: Urology;  Laterality: N/A;    PENILE PROSTHESIS IMPLANT N/A 10/18/2022    Procedure: INSERTION, PENILE PROSTHESIS;  Surgeon: Td El MD;  Location: [facility] OR 2ND FLR;  Service: Urology;  Laterality:  N/A;  1.5  hours    PROSTATE BIOPSY      ROBOT-ASSISTED LAPAROSCOPIC PELVIC LYMPHADENECTOMY USING DA ERNIE XI Bilateral 1/20/2021    Procedure: XI ROBOTIC LYMPHADENECTOMY, PELVIC;  Surgeon: BOOGIE Hammond MD;  Location: [facility] OR;  Service: Urology;  Laterality: Bilateral;    ROBOT-ASSISTED LAPAROSCOPIC PROSTATECTOMY USING DA ERNIE XI N/A 1/20/2021    Procedure: XI ROBOTIC PROSTATECTOMY;  Surgeon: BOOGIE Hammond MD;  Location: [facility] OR;  Service: Urology;  Laterality: N/A;    trus/bx 2020      TUMOR REMOVAL      back of head         Review of patient's allergies indicates:  No Known Allergies      Current Outpatient Medications   Medication Sig Dispense Refill    abiraterone (ZYTIGA) 250 mg Tab Take 1 tablet (250 mg total) by mouth once daily Take with a low fat meal. 30 tablet 11    amLODIPine (NORVASC) 10 MG tablet Take 1 tablet (10 mg total) by mouth once daily. 90 tablet 12    bicalutamide (CASODEX) 50 MG Tab Take 1 tablet (50 mg total) by mouth once daily. 30 tablet 3    cholecalciferol, vitamin D3, (VITAMIN D3) 25 mcg (1,000 unit) capsule Take 1 capsule (1,000 Units total) by mouth once daily. 30 capsule 11    hydroCHLOROthiazide (HYDRODIURIL) 25 MG tablet Take 1 tablet (25 mg total) by mouth once daily. 90 tablet 11    irbesartan (AVAPRO) 300 MG tablet Take 1 tablet (300 mg total) by mouth every evening. 90 tablet 12    ketoconazole (NIZORAL) 2 % shampoo Apply topically twice a week. 500 mL 12    predniSONE (DELTASONE) 5 MG tablet Take 1 tablet (5 mg total) by mouth once daily. Take as directed with water on an empty stomach. 30 tablet 5    tadalafiL (CIALIS) 20 MG Tab Take 1 tablet (20 mg total) by mouth every other day. Take 1 hour before intercourse 20 tablet 11    traZODone (DESYREL) 50 MG tablet Take 1 tablet (50 mg total) by mouth nightly as needed for Insomnia. 90 tablet 1    calcium carbonate (TUMS) 200 mg calcium (500 mg) chewable tablet Take 2 tablets (1,000 mg total) by mouth once daily. 180  "tablet 3     Current Facility-Administered Medications   Medication Dose Route Frequency Provider Last Rate Last Admin    alprostadiL (PROSTIN VR) 50 mcg, phentolamine (REGITINE) 1 mg, papaverine 0.03 mg / 5 mL  0.1 mL Intracavernosal 1 time in Clinic/HOD Dale James MD        PHENYLephrine injection 800 mcg  800 mcg Intracavernosal Once Dale James MD            Objective:      Physical Exam:   BP (!) 144/82 (BP Location: Left arm, Patient Position: Sitting)   Pulse 70   Temp 97.9 °F (36.6 °C) (Oral)   Resp 18   Ht 6' 2" (1.88 m)   Wt 117.6 kg (259 lb 4.2 oz)   SpO2 98%   BMI 33.29 kg/m²       ECOG Performance status: (0) Fully active, able to carry on all predisease performance without restriction     Physical Exam  Constitutional:       General: He is not in acute distress.     Appearance: Normal appearance.   HENT:      Head: Normocephalic.   Eyes:      General: No scleral icterus.     Extraocular Movements: Extraocular movements intact.      Conjunctiva/sclera: Conjunctivae normal.   Cardiovascular:      Rate and Rhythm: Normal rate.   Pulmonary:      Effort: Pulmonary effort is normal. No respiratory distress.   Abdominal:      General: There is no distension.      Palpations: Abdomen is soft.   Skin:     General: Skin is warm and dry.   Neurological:      Mental Status: He is alert and oriented to person, place, and time.      Motor: No weakness.   Psychiatric:         Mood and Affect: Mood normal.         Behavior: Behavior normal.         Thought Content: Thought content normal.          Recent Labs:   Lab Results   Component Value Date    WBC 5.46 03/28/2024    RBC 5.28 03/28/2024    HGB 15.3 03/28/2024    HCT 46.6 03/28/2024     03/28/2024     02/13/2025    K 3.7 02/13/2025     02/13/2025    CO2 28 02/13/2025    GLU 93 02/13/2025    BUN 20 02/13/2025    CREATININE 1.0 02/13/2025    CALCIUM 9.6 02/13/2025    BILITOT 0.6 02/13/2025    AST 39 02/13/2025    ALT 33 " 02/13/2025          Lab Results   Component Value Date    PSADIAG 0.23 02/24/2025    PSADIAG 0.84 01/13/2025    PSADIAG 1.0 10/24/2024    PSADIAG 0.73 03/28/2024    PSADIAG 0.05 08/02/2022    PSADIAG 0.05 05/04/2022    PSADIAG <0.01 12/14/2021    PSADIAG <0.01 09/09/2021    PSADIAG <0.01 06/16/2021    PSADIAG 0.01 02/25/2021    PSA 5.1 (H) 06/26/2020    PSA 3.9 04/11/2018    PSA 3.1 04/11/2017    PSA 2.6 11/02/2015    PSA 2.0 03/10/2014    PSA 1.5 03/23/2006        Cardiovascular Screening:  Primary care physician: Prashanth Torres MD      The 10-year ASCVD risk score (Geoffrey IBARRA, et al., 2019) is: 15.7%    Values used to calculate the score:      Age: 59 years      Sex: Male      Is Non- : Yes      Diabetic: No      Tobacco smoker: No      Systolic Blood Pressure: 144 mmHg      Is BP treated: Yes      HDL Cholesterol: 45 mg/dL      Total Cholesterol: 226 mg/dL    EKG:   Results for orders placed or performed during the hospital encounter of 11/06/20   EKG 12-lead    Collection Time: 11/06/20  9:29 AM    Narrative    Test Reason : PRE-OP    Vent. Rate : 054 BPM     Atrial Rate : 054 BPM     P-R Int : 156 ms          QRS Dur : 098 ms      QT Int : 418 ms       P-R-T Axes : 023 068 045 degrees     QTc Int : 396 ms    Sinus bradycardia  Otherwise normal ECG    Confirmed by Shane ESCALANTE, Tao OSMAN ([id number]) on 11/7/2020 12:56:59 PM    Referred By:             Confirmed By:Tao Lynn MD       Body mass index is 33.29 kg/m².    Lab Results   Component Value Date    CHOL 226 (H) 03/28/2024    LDLCALC 170.4 (H) 03/28/2024    HDL 45 03/28/2024    TRIG 53 03/28/2024    HGBA1C 5.4 08/20/2021          Bone Health    Lab Results   Component Value Date    [id number] 24 (L) 02/24/2025        No results found for this or any previous visit.      PSMA PET IMAGING+     No results found for this or any previous visit.       I have personally reviewed the above imaging.     Path:   Reviewed pathology  as documented above.      Diagnoses:     1. Prostate cancer    2. Long term current use of therapeutic drug    3. Androgen deprivation therapy    4. Androgen-induced osteoporosis    5. Primary hypertension            Assessment and Plan:     1. Prostate cancer  Overview:  Biochemical and pelvic berta relapse of high risk prostate cancer. Initially underwent radical prostatectomy 01/20/2021 with pT3bN0 disease. PSA malcolm was <0.01. Had Rising PSA as of 05/2022, up to 1.0 10/2024. PSMA PET CT 01/2025 with tracer avid chuy-rectal soft tissue lesion c/f berta relapse.    Assessment & Plan:  - stopped bicalutamide  - leuprolide injection today, next due 5/24/25  - start abiraterone/prednisone regimen today  - PSA responding, 0.23 today      2. Long term current use of therapeutic drug    3. Androgen deprivation therapy    4. Androgen-induced osteoporosis  Assessment & Plan:  - DEXA reviewed  - continue Ca/Vit D supplements    Orders:  -     calcium carbonate (TUMS) 200 mg calcium (500 mg) chewable tablet; Take 2 tablets (1,000 mg total) by mouth once daily.  Dispense: 180 tablet; Refill: 3    5. Primary hypertension  Overview:  - home meds include amlodipine, hydrochlorothiazide, and irbesartan      Other orders  -     Cancel: leuprolide (LUPRON) injection 22.5 mg           Melissa Voltz, APRN [place] [address]  Phone: (o) [phone]          Follow up:   Route Chart for Scheduling    Med Onc Chart Routing      Follow up with physician . As scheduled   Follow up with MANASA    Infusion scheduling note    Injection scheduling note leuprolide in 3 months   Labs   Scheduling:  Preferred lab:  Lab interval:  As scheduled   Imaging    Pharmacy appointment    Other referrals                   Treatment Plan Information   OP ABIRATERONE DAILY PREDNISONE BID Andrew Chin MD   Associated diagnosis: Prostate cancer Stage IIIB pT3b, pN0, cM0, PSA: 5.1, Grade Group: 2  noted on 11/11/2020   Line of treatment: First Line  Treatment Goal: Curative     Upcoming Treatment Dates - OP ABIRATERONE DAILY PREDNISONE BID    No upcoming days in selected categories.    Supportive Plan Information  OP PROSTATE LEUPROLIDE Q3MO Andrew Chin MD   Associated Diagnosis: Prostate cancer Stage IIIB pT3b, pN0, cM0, PSA: 5.1, Grade Group: 2 noted on 11/11/2020   Line of treatment: First Line   Treatment goal: Curative     Upcoming Treatment Dates - OP PROSTATE LEUPROLIDE Q3MO    5/8/2025       Chemotherapy       leuprolide (LUPRON) injection 22.5 mg  7/31/2025       Chemotherapy       leuprolide (LUPRON) injection 22.5 mg  10/23/2025       Chemotherapy       leuprolide (LUPRON) injection 22.5 mg  1/15/2026       Chemotherapy       leuprolide (LUPRON) injection 22.5 mg         The above information has been reviewed with the patient and all questions have been answered to their apparent satisfaction.  They understand that they can call the clinic with any questions.     code applied: patient requires or will require a continuous, longitudinal, and active collaborative plan of care related to this patient's health condition, prostate cancer --the management of which requires the direction of a practitioner with specialized clinical knowledge, skill, and expertise.

## 2025-03-10 ENCOUNTER — LAB VISIT (OUTPATIENT)
Dept: LAB | Facility: HOSPITAL | Age: 60
End: 2025-03-10
Attending: HOSPITALIST
Payer: COMMERCIAL

## 2025-03-10 ENCOUNTER — TELEPHONE (OUTPATIENT)
Dept: HEMATOLOGY/ONCOLOGY | Facility: CLINIC | Age: 60
End: 2025-03-10
Payer: COMMERCIAL

## 2025-03-10 DIAGNOSIS — C61 PROSTATE CANCER: ICD-10-CM

## 2025-03-10 LAB
ALBUMIN SERPL BCP-MCNC: 4.1 G/DL (ref 3.5–5.2)
ALP SERPL-CCNC: 47 U/L (ref 40–150)
ALT SERPL W/O P-5'-P-CCNC: 53 U/L (ref 10–44)
ANION GAP SERPL CALC-SCNC: 7 MMOL/L (ref 8–16)
AST SERPL-CCNC: 63 U/L (ref 10–40)
BILIRUB SERPL-MCNC: 0.9 MG/DL (ref 0.1–1)
BUN SERPL-MCNC: 17 MG/DL (ref 6–20)
CALCIUM SERPL-MCNC: 9.1 MG/DL (ref 8.7–10.5)
CHLORIDE SERPL-SCNC: 102 MMOL/L (ref 95–110)
CO2 SERPL-SCNC: 28 MMOL/L (ref 23–29)
CREAT SERPL-MCNC: 1 MG/DL (ref 0.5–1.4)
EST. GFR  (NO RACE VARIABLE): >60 ML/MIN/1.73 M^2
GLUCOSE SERPL-MCNC: 101 MG/DL (ref 70–110)
POTASSIUM SERPL-SCNC: 3.8 MMOL/L (ref 3.5–5.1)
PROT SERPL-MCNC: 7.4 G/DL (ref 6–8.4)
SODIUM SERPL-SCNC: 137 MMOL/L (ref 136–145)

## 2025-03-10 PROCEDURE — 36415 COLL VENOUS BLD VENIPUNCTURE: CPT | Performed by: HOSPITALIST

## 2025-03-10 PROCEDURE — 80053 COMPREHEN METABOLIC PANEL: CPT | Performed by: HOSPITALIST

## 2025-03-10 NOTE — TELEPHONE ENCOUNTER
Left Chapo GRAVES on 3/10/25 to call me back so we can review the results of his hereditary cancer panel genetic testing. -Libra Moya, [facility] [place], [place]

## 2025-03-24 ENCOUNTER — OFFICE VISIT (OUTPATIENT)
Dept: HEMATOLOGY/ONCOLOGY | Facility: CLINIC | Age: 60
End: 2025-03-24
Payer: COMMERCIAL

## 2025-03-24 ENCOUNTER — LAB VISIT (OUTPATIENT)
Dept: LAB | Facility: HOSPITAL | Age: 60
End: 2025-03-24
Attending: HOSPITALIST
Payer: COMMERCIAL

## 2025-03-24 VITALS
RESPIRATION RATE: 18 BRPM | WEIGHT: 240.31 LBS | HEART RATE: 62 BPM | DIASTOLIC BLOOD PRESSURE: 74 MMHG | TEMPERATURE: 98 F | OXYGEN SATURATION: 99 % | BODY MASS INDEX: 30.84 KG/M2 | SYSTOLIC BLOOD PRESSURE: 120 MMHG | HEIGHT: 74 IN

## 2025-03-24 DIAGNOSIS — C61 PROSTATE CANCER: ICD-10-CM

## 2025-03-24 DIAGNOSIS — G62.9 NEUROPATHY: ICD-10-CM

## 2025-03-24 DIAGNOSIS — G62.9 NEUROPATHY: Primary | ICD-10-CM

## 2025-03-24 DIAGNOSIS — N39.3 MALE STRESS INCONTINENCE: ICD-10-CM

## 2025-03-24 DIAGNOSIS — C61 PROSTATE CANCER: Primary | ICD-10-CM

## 2025-03-24 DIAGNOSIS — M79.89 HAND SWELLING: ICD-10-CM

## 2025-03-24 LAB
ABSOLUTE NEUTROPHIL COUNT (OHS): 4.01 K/UL (ref 1.8–7.7)
ALBUMIN SERPL BCP-MCNC: 3.9 G/DL (ref 3.5–5.2)
ALP SERPL-CCNC: 53 UNIT/L (ref 40–150)
ALT SERPL W/O P-5'-P-CCNC: 35 UNIT/L (ref 10–44)
ANION GAP (OHS): 9 MMOL/L (ref 8–16)
AST SERPL-CCNC: 39 UNIT/L (ref 11–45)
BILIRUB SERPL-MCNC: 0.5 MG/DL (ref 0.1–1)
BUN SERPL-MCNC: 17 MG/DL (ref 6–20)
CALCIUM SERPL-MCNC: 9.4 MG/DL (ref 8.7–10.5)
CHLORIDE SERPL-SCNC: 104 MMOL/L (ref 95–110)
CO2 SERPL-SCNC: 23 MMOL/L (ref 23–29)
CREAT SERPL-MCNC: 0.9 MG/DL (ref 0.5–1.4)
ERYTHROCYTE [DISTWIDTH] IN BLOOD BY AUTOMATED COUNT: 14 % (ref 11.5–14.5)
GFR SERPLBLD CREATININE-BSD FMLA CKD-EPI: >60 ML/MIN/1.73/M2
GLUCOSE SERPL-MCNC: 106 MG/DL (ref 70–110)
HCT VFR BLD AUTO: 39.4 % (ref 40–54)
HGB BLD-MCNC: 12.8 GM/DL (ref 14–18)
IMM GRANULOCYTES # BLD AUTO: 0.01 K/UL (ref 0–0.04)
MCH RBC QN AUTO: 29.4 PG (ref 27–50)
MCHC RBC AUTO-ENTMCNC: 32.5 G/DL (ref 32–36)
MCV RBC AUTO: 91 FL (ref 82–98)
PLATELET # BLD AUTO: 252 K/UL (ref 150–450)
PMV BLD AUTO: 9.9 FL (ref 9.2–12.9)
POTASSIUM SERPL-SCNC: 4.3 MMOL/L (ref 3.5–5.1)
PROT SERPL-MCNC: 6.9 GM/DL (ref 6–8.4)
PSA SERPL-MCNC: 0.01 NG/ML
RBC # BLD AUTO: 4.35 M/UL (ref 4.6–6.2)
SODIUM SERPL-SCNC: 136 MMOL/L (ref 136–145)
WBC # BLD AUTO: 5.55 K/UL (ref 3.9–12.7)

## 2025-03-24 PROCEDURE — 84153 ASSAY OF PSA TOTAL: CPT

## 2025-03-24 PROCEDURE — 4010F ACE/ARB THERAPY RXD/TAKEN: CPT | Mod: CPTII,S$GLB,, | Performed by: HOSPITALIST

## 2025-03-24 PROCEDURE — 3078F DIAST BP <80 MM HG: CPT | Mod: CPTII,S$GLB,, | Performed by: HOSPITALIST

## 2025-03-24 PROCEDURE — G2211 COMPLEX E/M VISIT ADD ON: HCPCS | Mod: S$GLB,,, | Performed by: HOSPITALIST

## 2025-03-24 PROCEDURE — 36415 COLL VENOUS BLD VENIPUNCTURE: CPT

## 2025-03-24 PROCEDURE — 3008F BODY MASS INDEX DOCD: CPT | Mod: CPTII,S$GLB,, | Performed by: HOSPITALIST

## 2025-03-24 PROCEDURE — 99215 OFFICE O/P EST HI 40 MIN: CPT | Mod: S$GLB,,, | Performed by: HOSPITALIST

## 2025-03-24 PROCEDURE — 99999 PR PBB SHADOW E&M-EST. PATIENT-LVL IV: CPT | Mod: PBBFAC,,, | Performed by: HOSPITALIST

## 2025-03-24 PROCEDURE — 85027 COMPLETE CBC AUTOMATED: CPT

## 2025-03-24 PROCEDURE — 84075 ASSAY ALKALINE PHOSPHATASE: CPT

## 2025-03-24 PROCEDURE — 3074F SYST BP LT 130 MM HG: CPT | Mod: CPTII,S$GLB,, | Performed by: HOSPITALIST

## 2025-03-24 PROCEDURE — 1159F MED LIST DOCD IN RCRD: CPT | Mod: CPTII,S$GLB,, | Performed by: HOSPITALIST

## 2025-03-24 NOTE — PROGRESS NOTES
[facility]: New patient visit  Best Contact Phone Number(s): [phone] (home)       Cancer/Stage/TNM:    Cancer Staging   Prostate cancer  Staging form: Prostate, AJCC 8th Edition  - Pathologic stage from 10/24/2024: Stage IIIB (pT3b, pN0, cM0, PSA: 5.1, Grade Group: 2) - Signed by Antoine Warner Jr., MD on 10/24/2024        Reason for visit:  Pelvic berta relapse of of high risk prostate cancer    Molecular:  Germline Testing: N/A  Somatic Testing: N/A    Treatment History:   01/13/25 - 2/13/25 Bicalutamide  02/24/25 -   Leuprolide q 3 months  02/24/24 -   Abiraterone + prednisone     HPI:   Chapo Ohara is a 59 y.o. male with biochemical and pelvic berta relapse of high risk prostate cancer. Initially underwent radical prostatectomy 01/20/2021 with pT3bN0 disease. PSA malcolm was <0.01. Had Rising PSA as of 05/2022, up to 1.0 10/2024. PSMA PET CT 01/2025 with tracer avid chuy-rectal soft tissue lesion c/f berta relapse. He started ADT + AAP 02/2025. He presents to medical oncology clinic for routine follow up.    Interval history:    Started ADT + AAP 1 month ago.    Energy is good. Has modest hot flashes up to 3x per day. Continues to have stress incontinence. Wears a shield - doesn't have to change throughout the day. No signficant urgency or obstructive symtpoms. Nocturia 1x per night.    Biggest issue after starting treatment has been new onset hand swelling overnight with associated persistent numbness.  Also with new onset cold intolerably. Reports normal bowel movements. No new rash. No CP, SOB or cough. No palptaitions. No leg swelling.      Oncology History   Prostate cancer   11/11/2020 Initial Diagnosis    Prostate cancer     1/20/2021 Surgery    01/20/21 Radical prostatectomy. Prostate adenocarcinoma. Bergholz 3+4. Positive surgical margin. No EPE. Left SV involved. Additional PNI. 0/10LN. pT3bN0     12/2021 Tumor Markers    12/14/21: PSA malcolm <0.01     8/2022 Tumor Markers     08/02/22: PSA 0.05     10/24/2024 Cancer Staged    Staging form: Prostate, AJCC 8th Edition  - Pathologic stage from 10/24/2024: Stage IIIB (pT3b, pN0, cM0, PSA: 5.1, Grade Group: 2)     10/2024 Tumor Markers    10/24/24: PSA 1.0     1/8/2025 Imaging Significant Findings    01/08/25 PSMA PET CT  Impression:  - Postoperative changes with no tracer avid foci in the prostate bed .  - Tracer avid soft tissue lesion, possible lymph node or inflammation, in the pelvis adjacent to the left side of the rectum.  Attention to follow     2/6/2025 -  Chemotherapy    Treatment Summary   Plan Name: OP ABIRATERONE DAILY PREDNISONE BID  Treatment Goal: Curative  Status: Active  Start Date: 2/6/2025  End Date: 2/6/2025  Provider: Andrew Chin MD  Chemotherapy: abiraterone (ZYTIGA) 250 mg Tab, 250 mg, Oral, Daily, 1 of 1 cycle, Start date: 2/6/2025, End date: 2/6/2026           Past Medical History:   Diagnosis Date    History of colonic polyps 04/11/2017    HTN (hypertension) 03/07/2014    Hyperlipidemia 03/07/2014    patient states he does not take medication    Prostate cancer          Past Surgical History:   Procedure Laterality Date    COLONOSCOPY N/A 12/11/2015    Procedure: COLONOSCOPY;  Surgeon: Balta Farah MD;  Location: [facility];  Service: Endoscopy;  Laterality: N/A;    COLONOSCOPY N/A 3/10/2022    Procedure: COLONOSCOPY;  Surgeon: Natalia Mcqueen MD;  Location: [facility];  Service: Endoscopy;  Laterality: N/A;  1/26 covid test 3/7 @ [place]; instructions to portal-st    CYSTOSCOPY WITH URODYNAMIC TESTING N/A 11/28/2022    Procedure: CYSTOSCOPY, WITH URODYNAMIC TESTING;  Surgeon: Jason Castaneda MD;  Location: [facility] OR 1ST FLR;  Service: Urology;  Laterality: N/A;  1hr    INSERTION OF SLING FOR MALE URINARY INCONTINENCE N/A 10/18/2022    Procedure: INSERTION, BLADDER SLING, MALE, FOR URINARY INCONTINENCE;  Surgeon: Jason Castaneda MD;  Location: [facility] OR 2ND FLR;  Service: Urology;  Laterality: N/A;     PENILE PROSTHESIS IMPLANT N/A 10/18/2022    Procedure: INSERTION, PENILE PROSTHESIS;  Surgeon: Td El MD;  [facility];  Service: Urology;  Laterality: N/A;  1.5  hours    PROSTATE BIOPSY      ROBOT-ASSISTED LAPAROSCOPIC PELVIC LYMPHADENECTOMY USING DA ERNIE XI Bilateral 1/20/2021    Procedure: XI ROBOTIC LYMPHADENECTOMY, PELVIC;  Surgeon: BOOGIE Hammond MD;  Location: [facility] OR;  Service: Urology;  Laterality: Bilateral;    ROBOT-ASSISTED LAPAROSCOPIC PROSTATECTOMY USING DA ERNIE XI N/A 1/20/2021    Procedure: XI ROBOTIC PROSTATECTOMY;  Surgeon: BOOGIE Hammond MD;  Location: [facility] OR;  Service: Urology;  Laterality: N/A;    trus/bx 2020      TUMOR REMOVAL      back of head         Review of patient's allergies indicates:  No Known Allergies      Current Outpatient Medications   Medication Sig Dispense Refill    abiraterone (ZYTIGA) 250 mg Tab Take 1 tablet (250 mg total) by mouth once daily Take with a low fat meal. 30 tablet 11    amLODIPine (NORVASC) 10 MG tablet Take 1 tablet (10 mg total) by mouth once daily. 90 tablet 12    calcium carbonate (TUMS) 200 mg calcium (500 mg) chewable tablet Take 2 tablets (1,000 mg total) by mouth once daily. 180 tablet 3    cholecalciferol, vitamin D3, (VITAMIN D3) 25 mcg (1,000 unit) capsule Take 1 capsule (1,000 Units total) by mouth once daily. 30 capsule 11    hydroCHLOROthiazide (HYDRODIURIL) 25 MG tablet Take 1 tablet (25 mg total) by mouth once daily. 90 tablet 11    irbesartan (AVAPRO) 300 MG tablet Take 1 tablet (300 mg total) by mouth every evening. 90 tablet 12    ketoconazole (NIZORAL) 2 % shampoo Apply topically twice a week. 500 mL 12    predniSONE (DELTASONE) 5 MG tablet Take 1 tablet (5 mg total) by mouth once daily. Take as directed with water on an empty stomach. 30 tablet 5    tadalafiL (CIALIS) 20 MG Tab Take 1 tablet (20 mg total) by mouth every other day. Take 1 hour before intercourse 20 tablet 11    traZODone (DESYREL) 50 MG  "tablet Take 1 tablet (50 mg total) by mouth nightly as needed for Insomnia. 90 tablet 1     Current Facility-Administered Medications   Medication Dose Route Frequency Provider Last Rate Last Admin    alprostadiL (PROSTIN VR) 50 mcg, phentolamine (REGITINE) 1 mg, papaverine 0.03 mg / 5 mL  0.1 mL Intracavernosal 1 time in Clinic/HOD Dale James MD        PHENYLephrine injection 800 mcg  800 mcg Intracavernosal Once Dale James MD            Objective:      Physical Exam:   /74 (BP Location: Left arm, Patient Position: Sitting)   Pulse 62   Temp 97.6 °F (36.4 °C) (Temporal)   Resp 18   Ht 6' 2" (1.88 m)   Wt 109 kg (240 lb 4.8 oz)   SpO2 99%   BMI 30.85 kg/m²       ECOG Performance status: (0) Fully active, able to carry on all predisease performance without restriction     Physical Exam  Constitutional:       General: He is not in acute distress.     Appearance: Normal appearance.   HENT:      Head: Normocephalic.   Eyes:      General: No scleral icterus.     Extraocular Movements: Extraocular movements intact.      Conjunctiva/sclera: Conjunctivae normal.   Cardiovascular:      Rate and Rhythm: Normal rate.   Pulmonary:      Effort: Pulmonary effort is normal. No respiratory distress.   Abdominal:      General: There is no distension.      Palpations: Abdomen is soft.   Skin:     General: Skin is warm and dry.   Neurological:      Mental Status: He is alert and oriented to person, place, and time.      Motor: No weakness.   Psychiatric:         Mood and Affect: Mood normal.         Behavior: Behavior normal.         Thought Content: Thought content normal.          Recent Labs:   Lab Results   Component Value Date    WBC 5.55 03/24/2025    RBC 4.35 (L) 03/24/2025    HGB 12.8 (L) 03/24/2025    HCT 39.4 (L) 03/24/2025     03/24/2025     03/24/2025    K 4.3 03/24/2025     03/24/2025    CO2 23 03/24/2025     03/10/2025    BUN 17 03/24/2025    CREATININE 0.9 " 03/24/2025    CALCIUM 9.4 03/24/2025    BILITOT 0.5 03/24/2025    AST 39 03/24/2025    ALT 35 03/24/2025          Lab Results   Component Value Date    PSADIAG 0.23 02/24/2025    PSADIAG 0.84 01/13/2025    PSADIAG 1.0 10/24/2024    PSADIAG 0.73 03/28/2024    PSADIAG 0.05 08/02/2022    PSADIAG 0.05 05/04/2022    PSADIAG <0.01 12/14/2021    PSADIAG <0.01 09/09/2021    PSADIAG <0.01 06/16/2021    PSADIAG 0.01 02/25/2021    PSA 0.01 03/24/2025    PSA 5.1 (H) 06/26/2020    PSA 3.9 04/11/2018    PSA 3.1 04/11/2017    PSA 2.6 11/02/2015    PSA 2.0 03/10/2014    PSA 1.5 03/23/2006        Cardiovascular Screening:  Primary care physician: Prashanth Torres MD      The 10-year ASCVD risk score (Geoffrey DK, et al., 2019) is: 11.3%    Values used to calculate the score:      Age: 59 years      Sex: Male      Is Non- : Yes      Diabetic: No      Tobacco smoker: No      Systolic Blood Pressure: 120 mmHg      Is BP treated: Yes      HDL Cholesterol: 45 mg/dL      Total Cholesterol: 226 mg/dL    EKG:   Results for orders placed or performed during the hospital encounter of 11/06/20   EKG 12-lead    Collection Time: 11/06/20  9:29 AM    Narrative    Test Reason : PRE-OP    Vent. Rate : 054 BPM     Atrial Rate : 054 BPM     P-R Int : 156 ms          QRS Dur : 098 ms      QT Int : 418 ms       P-R-T Axes : 023 068 045 degrees     QTc Int : 396 ms    Sinus bradycardia  Otherwise normal ECG    Confirmed by Shane ESCALANTE, Tao OSMAN ([id number]) on 11/7/2020 12:56:59 PM    Referred By:             Confirmed By:Tao Lynn MD       Body mass index is 30.85 kg/m².    Lab Results   Component Value Date    CHOL 226 (H) 03/28/2024    LDLCALC 170.4 (H) 03/28/2024    HDL 45 03/28/2024    TRIG 53 03/28/2024    HGBA1C 5.4 08/20/2021          Bone Health    Lab Results   Component Value Date    WEMAIFBM87AS 24 (L) 02/24/2025        No results found for this or any previous visit.      PSMA PET IMAGING+     No results found  for this or any previous visit.       I have personally reviewed the above imaging.     Path:   Reviewed pathology as documented above.      Diagnoses:     1. Prostate cancer    2. Neuropathy    3. Male stress incontinence    4. Hand swelling              Assessment and Plan:     1. Prostate cancer  Overview:  Biochemical and pelvic berta relapse of high risk prostate cancer. Initially underwent radical prostatectomy 01/20/2021 with pT3bN0 disease. PSA malcolm was <0.01. Had Rising PSA as of 05/2022, up to 1.0 10/2024. PSMA PET CT 01/2025 with tracer avid chuy-rectal soft tissue lesion c/f berta relapse.    Assessment & Plan:  Has developed new onset hand swelling and persistent neuropathy in his hands since starting ADT + AAP. Admittedly neuropathy not a common side effect of AAP, and edema much more likely in the legs.  Not clear to me this is his hormonal therapy, but will pause AAP to investigate  - Con't ADT  - Next Lupron due 5/08/25  - HOLD abiraterone for the next 2 weeks  - FU 2 weeks; likely resume AAP at that time ... If symptoms initially improve then worsen back on AAP, would permanently DC  - FU with rad onc as planned        2. Neuropathy  -     Hemoglobin A1C; Future; Expected date: 03/24/2025  -     TSH; Future; Expected date: 03/24/2025  -     Protein/Creatinine Ratio, Urine; Future; Expected date: 03/24/2025    3. Male stress incontinence  Assessment & Plan:  Mild. Hoping for some ongoing improvement prior to radiation therapy      4. Hand swelling  -     Protein/Creatinine Ratio, Urine; Future; Expected date: 03/24/2025          Andrew Chin MD MPH  Staff Physician     [facility]  [address] [place] [id number]  Email: [email or web address]  Phone: o) [phone] (c) [phone]             Follow up:   Route Chart for Scheduling    Med Onc Chart Routing      Follow up with physician . 5/8/25   Follow up with MANASA 2 weeks.   Infusion scheduling note     Injection scheduling note Lupron 5/8/25   Labs CBC, CMP and PSA   Scheduling:  Preferred lab:  Lab interval:     Imaging    Pharmacy appointment    Other referrals                   Treatment Plan Information   OP ABIRATERONE DAILY PREDNISONE BID Andrew Chin MD   Associated diagnosis: Prostate cancer Stage IIIB pT3b, pN0, cM0, PSA: 5.1, Grade Group: 2 noted on 11/11/2020   Line of treatment: First Line  Treatment Goal: Curative     Upcoming Treatment Dates - OP ABIRATERONE DAILY PREDNISONE BID    No upcoming days in selected categories.    Supportive Plan Information  OP PROSTATE LEUPROLIDE Q3MO Andrew Chin MD   Associated Diagnosis: Prostate cancer Stage IIIB pT3b, pN0, cM0, PSA: 5.1, Grade Group: 2 noted on 11/11/2020   Line of treatment: First Line   Treatment goal: Curative     Upcoming Treatment Dates - OP PROSTATE LEUPROLIDE Q3MO    5/8/2025       Chemotherapy       leuprolide (LUPRON) injection 22.5 mg  7/31/2025       Chemotherapy       leuprolide (LUPRON) injection 22.5 mg  10/23/2025       Chemotherapy       leuprolide (LUPRON) injection 22.5 mg  1/15/2026       Chemotherapy       leuprolide (LUPRON) injection 22.5 mg         The above information has been reviewed with the patient and all questions have been answered to their apparent satisfaction.  They understand that they can call the clinic with any questions.     code applied: patient requires or will require a continuous, longitudinal, and active collaborative plan of care related to this patient's health condition, prostate cancer --the management of which requires the direction of a practitioner with specialized clinical knowledge, skill, and expertise.

## 2025-03-24 NOTE — PATIENT INSTRUCTIONS
PSA is under good control, but unclear reason for your hand swelling and numbness.    Please stop the abiraterone. Keep taking prednisone x 5 days. Then stop prednisone. Give us a call in 2 weeks to let us know how you are feeling. I will also check thyroid levels, B12 levels, and a diabetes screen.    Can restart abiraterone around that time as well. If symptoms improve, then worsen again will plan to permanently stop abiraterone. If symptoms don't get better, then can restart abiraterone without worrying about the medication.

## 2025-03-24 NOTE — ASSESSMENT & PLAN NOTE
Has developed new onset hand swelling and persistent neuropathy in his hands since starting ADT + AAP. Admittedly neuropathy not a common side effect of AAP, and edema much more likely in the legs.  Not clear to me this is his hormonal therapy, but will pause AAP to investigate  - Con't ADT  - Next Lupron due 5/08/25  - HOLD abiraterone for the next 2 weeks  - FU 2 weeks; likely resume AAP at that time ... If symptoms initially improve then worsen back on AAP, would permanently DC  - FU with rad onc as planned

## 2025-03-25 ENCOUNTER — TELEPHONE (OUTPATIENT)
Dept: HEMATOLOGY/ONCOLOGY | Facility: CLINIC | Age: 60
End: 2025-03-25
Payer: COMMERCIAL

## 2025-03-25 DIAGNOSIS — C61 PROSTATE CANCER: Primary | ICD-10-CM

## 2025-03-25 DIAGNOSIS — Z80.42 FAMILY HISTORY OF PROSTATE CANCER: ICD-10-CM

## 2025-03-25 NOTE — TELEPHONE ENCOUNTER
Cancer Genetics  Hereditary and High-Risk Clinic  Department of Hematology and Oncology  [facility]    [facility]    Patient ID  Name: Chapo Ohara    : 1965    MRN: [id number]      IMPRESSION     Chapo's panel genetic testing was negative for actionable mutations in 39 genes associated with hereditary breast, gastrointestinal, gynecologic, kidney, pancreatic, prostate and skin cancers. Results were disclosed over the phone on 3/25/25.    DISCUSSION      GENETIC TEST RESULTS    Chapo had a sample submitted on 25 to [facility] for CancerNext +RNAinsight panel testing. This panel includes sequencing and/or deletion/duplication analysis of the following 39 genes:    APC, LAURE, AXIN2, BAP1, BARD1, BMPR1A, BRCA1, BRCA2, BRIP1, CDH1, CDKN2A, CHEK2, EPCAM, FH, FLCN, GREM1, HOXB13, MBD4, MET, MLH1, MSH2, MSH3, MSH6, MUTYH, NF1, NTHL1, PALB2, PMS2, POLD1, POLE, PTEN, RAD51C, RAD51D, SMAD4, STK11, TP53, TSC1, TSC2, VHL     The results were negative for actionable mutations in any of these genes. This is considered a negative result, but it does not completely rule out a hereditary form of cancer in her family. Some individuals/families with cancer may have a mutation in a gene that is not included in this panel, and some may have mutations in one of these genes that is not detectable with our current technology. It could also be that Chapo's personal and family history of cancer is not due to a hereditary cause.     Given his negative results, the likelihood of a hereditary form of cancer has been drastically reduced for Chapo and his family. He has undergone comprehensive hereditary cancer genetic testing, and no further genetic testing is recommended at this time. However, the family history remains somewhat suspicious of a hereditary cause given three close affected relatives and early-onset, so increased prostate cancer screening is still recommended for close male relatives as  detailed below.        CANCER RISKS    We have no reason to believe Chapo has an increased risk of other cancers based on his genetic test results. He should continue to undergo treatment for his prostate cancer per Dr. Chin's recommendations. He should also continue with other cancer screenings, including repeat colonoscopy due in 2029 (per gastroenterologist).    FAMILY MEMBERS    Chapo's close male relatives may still be at increased risk of prostate cancer given the number of relatives with prostate cancer. Specifically, his paternal half-brothers and son should speak with their physician about their prostate cancer risks and appropriate screening (including starting by 35-41yo for his son). However, it is not recommended for unaffected individuals to undergo genetic testing at this time.    Chapo received comprehensive genetic counseling regarding his negative genetic test results. Benefits and limitations were discussed, and he was provided with an electronic copy of his results report. Chapo is encouraged to contact cancer genetics if there are any updates to his personal or family history, or if he has any questions or concerns.    This assessment is based on the history and reports provided, as well as the current scientific knowledge regarding cancer genetics.    Libra Moya, [facility] [place] [facility], [facility]  Senior Genetic Counselor, Hereditary and High-Risk Clinic  Department of Hematology and Oncology  [facility]    [facility]        CC:  Dr. Andrew Chin

## 2025-04-10 ENCOUNTER — OFFICE VISIT (OUTPATIENT)
Dept: HEMATOLOGY/ONCOLOGY | Facility: CLINIC | Age: 60
End: 2025-04-10
Payer: COMMERCIAL

## 2025-04-10 ENCOUNTER — LAB VISIT (OUTPATIENT)
Dept: LAB | Facility: HOSPITAL | Age: 60
End: 2025-04-10
Attending: HOSPITALIST
Payer: COMMERCIAL

## 2025-04-10 VITALS
HEIGHT: 74 IN | HEART RATE: 59 BPM | WEIGHT: 242.31 LBS | OXYGEN SATURATION: 96 % | SYSTOLIC BLOOD PRESSURE: 130 MMHG | TEMPERATURE: 98 F | BODY MASS INDEX: 31.1 KG/M2 | DIASTOLIC BLOOD PRESSURE: 81 MMHG

## 2025-04-10 DIAGNOSIS — C61 PROSTATE CANCER: ICD-10-CM

## 2025-04-10 DIAGNOSIS — C61 PROSTATE CANCER: Primary | ICD-10-CM

## 2025-04-10 DIAGNOSIS — T38.7X5A ANDROGEN-INDUCED OSTEOPOROSIS: ICD-10-CM

## 2025-04-10 DIAGNOSIS — I10 PRIMARY HYPERTENSION: ICD-10-CM

## 2025-04-10 DIAGNOSIS — M81.8 ANDROGEN-INDUCED OSTEOPOROSIS: ICD-10-CM

## 2025-04-10 LAB
ABSOLUTE NEUTROPHIL COUNT (OHS): 5.05 K/UL (ref 1.8–7.7)
ALBUMIN SERPL BCP-MCNC: 3.8 G/DL (ref 3.5–5.2)
ALP SERPL-CCNC: 54 UNIT/L (ref 40–150)
ALT SERPL W/O P-5'-P-CCNC: 26 UNIT/L (ref 10–44)
ANION GAP (OHS): 9 MMOL/L (ref 8–16)
AST SERPL-CCNC: 30 UNIT/L (ref 11–45)
BILIRUB SERPL-MCNC: 0.3 MG/DL (ref 0.1–1)
BUN SERPL-MCNC: 23 MG/DL (ref 6–20)
CALCIUM SERPL-MCNC: 9.6 MG/DL (ref 8.7–10.5)
CHLORIDE SERPL-SCNC: 105 MMOL/L (ref 95–110)
CO2 SERPL-SCNC: 26 MMOL/L (ref 23–29)
CREAT SERPL-MCNC: 0.9 MG/DL (ref 0.5–1.4)
ERYTHROCYTE [DISTWIDTH] IN BLOOD BY AUTOMATED COUNT: 14.4 % (ref 11.5–14.5)
GFR SERPLBLD CREATININE-BSD FMLA CKD-EPI: >60 ML/MIN/1.73/M2
GLUCOSE SERPL-MCNC: 82 MG/DL (ref 70–110)
HCT VFR BLD AUTO: 41.5 % (ref 40–54)
HGB BLD-MCNC: 13.4 GM/DL (ref 14–18)
IMM GRANULOCYTES # BLD AUTO: 0.03 K/UL (ref 0–0.04)
MCH RBC QN AUTO: 29.1 PG (ref 27–31)
MCHC RBC AUTO-ENTMCNC: 32.3 G/DL (ref 32–36)
MCV RBC AUTO: 90 FL (ref 82–98)
PLATELET # BLD AUTO: 248 K/UL (ref 150–450)
PMV BLD AUTO: 10.4 FL (ref 9.2–12.9)
POTASSIUM SERPL-SCNC: 4.1 MMOL/L (ref 3.5–5.1)
PROT SERPL-MCNC: 7.1 GM/DL (ref 6–8.4)
PSA SERPL-MCNC: <0.01 NG/ML
RBC # BLD AUTO: 4.61 M/UL (ref 4.6–6.2)
SODIUM SERPL-SCNC: 140 MMOL/L (ref 136–145)
WBC # BLD AUTO: 7.26 K/UL (ref 3.9–12.7)

## 2025-04-10 PROCEDURE — 84075 ASSAY ALKALINE PHOSPHATASE: CPT

## 2025-04-10 PROCEDURE — G2211 COMPLEX E/M VISIT ADD ON: HCPCS | Mod: S$GLB,,, | Performed by: NURSE PRACTITIONER

## 2025-04-10 PROCEDURE — 36415 COLL VENOUS BLD VENIPUNCTURE: CPT

## 2025-04-10 PROCEDURE — 84153 ASSAY OF PSA TOTAL: CPT

## 2025-04-10 PROCEDURE — 3044F HG A1C LEVEL LT 7.0%: CPT | Mod: CPTII,S$GLB,, | Performed by: NURSE PRACTITIONER

## 2025-04-10 PROCEDURE — 99999 PR PBB SHADOW E&M-EST. PATIENT-LVL III: CPT | Mod: PBBFAC,,, | Performed by: NURSE PRACTITIONER

## 2025-04-10 PROCEDURE — 4010F ACE/ARB THERAPY RXD/TAKEN: CPT | Mod: CPTII,S$GLB,, | Performed by: NURSE PRACTITIONER

## 2025-04-10 PROCEDURE — 99214 OFFICE O/P EST MOD 30 MIN: CPT | Mod: S$GLB,,, | Performed by: NURSE PRACTITIONER

## 2025-04-10 PROCEDURE — 3079F DIAST BP 80-89 MM HG: CPT | Mod: CPTII,S$GLB,, | Performed by: NURSE PRACTITIONER

## 2025-04-10 PROCEDURE — 3008F BODY MASS INDEX DOCD: CPT | Mod: CPTII,S$GLB,, | Performed by: NURSE PRACTITIONER

## 2025-04-10 PROCEDURE — 1159F MED LIST DOCD IN RCRD: CPT | Mod: CPTII,S$GLB,, | Performed by: NURSE PRACTITIONER

## 2025-04-10 PROCEDURE — 3075F SYST BP GE 130 - 139MM HG: CPT | Mod: CPTII,S$GLB,, | Performed by: NURSE PRACTITIONER

## 2025-04-10 PROCEDURE — 85027 COMPLETE CBC AUTOMATED: CPT

## 2025-04-10 NOTE — PROGRESS NOTES
[facility]: New patient visit  Best Contact Phone Number(s): [phone] (home)       Cancer/Stage/TNM:    Cancer Staging   Prostate cancer  Staging form: Prostate, AJCC 8th Edition  - Pathologic stage from 10/24/2024: Stage IIIB (pT3b, pN0, cM0, PSA: 5.1, Grade Group: 2) - Signed by Antoine Warner Jr., MD on 10/24/2024        Reason for visit:  Pelvic berta relapse of of high risk prostate cancer    Molecular:  Germline Testing: N/A  Somatic Testing: N/A    Treatment History:   01/13/25 - 2/13/25 Bicalutamide  02/24/25 -   Leuprolide q 3 months  02/24/24 -   Abiraterone + prednisone     HPI:   Chapo Ohara is a 59 y.o. male with biochemical and pelvic berta relapse of high risk prostate cancer. Initially underwent radical prostatectomy 01/20/2021 with pT3bN0 disease. PSA malcolm was <0.01. Had Rising PSA as of 05/2022, up to 1.0 10/2024. PSMA PET CT 01/2025 with tracer avid chuy-rectal soft tissue lesion c/f berta relapse. He started ADT + AAP 02/2025. He presents to medical oncology clinic for routine follow up.    Interval history:  Started ADT + AAP 1 month ago. Biggest issue after starting treatment has been new onset hand swelling overnight with associated persistent numbness.  Also with new onset cold intolerably. Abiraterone held - numbness persists to left middle and ring fingers. Patient does not want to restart oral medications at this time.  Willing to continue ADT.  Has follow up with radiation oncology scheduled. PSA currently undetectable.         Oncology History   Prostate cancer   11/11/2020 Initial Diagnosis    Prostate cancer     1/20/2021 Surgery    01/20/21 Radical prostatectomy. Prostate adenocarcinoma. Paolo 3+4. Positive surgical margin. No EPE. Left SV involved. Additional PNI. 0/10LN. pT3bN0     12/2021 Tumor Markers    12/14/21: PSA malcolm <0.01     8/2022 Tumor Markers    08/02/22: PSA 0.05     10/24/2024 Cancer Staged    Staging form: Prostate, AJCC 8th  Edition  - Pathologic stage from 10/24/2024: Stage IIIB (pT3b, pN0, cM0, PSA: 5.1, Grade Group: 2)     10/2024 Tumor Markers    10/24/24: PSA 1.0     1/8/2025 Imaging Significant Findings    01/08/25 PSMA PET CT  Impression:  - Postoperative changes with no tracer avid foci in the prostate bed .  - Tracer avid soft tissue lesion, possible lymph node or inflammation, in the pelvis adjacent to the left side of the rectum.  Attention to follow     2/6/2025 -  Chemotherapy    Treatment Summary   Plan Name: OP ABIRATERONE DAILY PREDNISONE BID  Treatment Goal: Curative  Status: Active  Start Date: 2/6/2025  End Date: 2/6/2025  Provider: Andrew Chin MD  Chemotherapy: abiraterone (ZYTIGA) 250 mg Tab, 250 mg, Oral, Daily, 1 of 1 cycle, Start date: 2/6/2025, End date: 2/6/2026           Past Medical History:   Diagnosis Date    History of colonic polyps 04/11/2017    HTN (hypertension) 03/07/2014    Hyperlipidemia 03/07/2014    patient states he does not take medication    Prostate cancer          Past Surgical History:   Procedure Laterality Date    COLONOSCOPY N/A 12/11/2015    Procedure: COLONOSCOPY;  Surgeon: Balta Farah MD;  Location: [facility];  Service: Endoscopy;  Laterality: N/A;    COLONOSCOPY N/A 3/10/2022    Procedure: COLONOSCOPY;  Surgeon: Natalia Mcqueen MD;  Location: [facility];  Service: Endoscopy;  Laterality: N/A;  1/26 covid test 3/7 @ [place]; instructions to portal-st    CYSTOSCOPY WITH URODYNAMIC TESTING N/A 11/28/2022    Procedure: CYSTOSCOPY, WITH URODYNAMIC TESTING;  Surgeon: Jason Castaneda MD;  Location: [facility] OR 1ST FLR;  Service: Urology;  Laterality: N/A;  1hr    INSERTION OF SLING FOR MALE URINARY INCONTINENCE N/A 10/18/2022    Procedure: INSERTION, BLADDER SLING, MALE, FOR URINARY INCONTINENCE;  Surgeon: Jason Castaneda MD;  Location: [facility] OR 2ND FLR;  Service: Urology;  Laterality: N/A;    PENILE PROSTHESIS IMPLANT N/A 10/18/2022    Procedure: INSERTION, PENILE PROSTHESIS;   Surgeon: Td El MD;  [address];  Service: Urology;  Laterality: N/A;  1.5  hours    PROSTATE BIOPSY      ROBOT-ASSISTED LAPAROSCOPIC PELVIC LYMPHADENECTOMY USING DA ERNIE XI Bilateral 1/20/2021    Procedure: XI ROBOTIC LYMPHADENECTOMY, PELVIC;  Surgeon: BOOGIE Hammond MD;  Location: [facility];  Service: Urology;  Laterality: Bilateral;    ROBOT-ASSISTED LAPAROSCOPIC PROSTATECTOMY USING DA ERNIE XI N/A 1/20/2021    Procedure: XI ROBOTIC PROSTATECTOMY;  Surgeon: BOOGIE Hammond MD;  Location: [facility];  Service: Urology;  Laterality: N/A;    trus/bx 2020      TUMOR REMOVAL      back of head         Review of patient's allergies indicates:  No Known Allergies      Current Outpatient Medications   Medication Sig Dispense Refill    abiraterone (ZYTIGA) 250 mg Tab Take 1 tablet (250 mg total) by mouth once daily Take with a low fat meal. 30 tablet 11    amLODIPine (NORVASC) 10 MG tablet Take 1 tablet (10 mg total) by mouth once daily. 90 tablet 12    calcium carbonate (TUMS) 200 mg calcium (500 mg) chewable tablet Take 2 tablets (1,000 mg total) by mouth once daily. 180 tablet 3    cholecalciferol, vitamin D3, (VITAMIN D3) 25 mcg (1,000 unit) capsule Take 1 capsule (1,000 Units total) by mouth once daily. 30 capsule 11    hydroCHLOROthiazide (HYDRODIURIL) 25 MG tablet Take 1 tablet (25 mg total) by mouth once daily. 90 tablet 11    irbesartan (AVAPRO) 300 MG tablet Take 1 tablet (300 mg total) by mouth every evening. 90 tablet 12    ketoconazole (NIZORAL) 2 % shampoo Apply topically twice a week. 500 mL 12    predniSONE (DELTASONE) 5 MG tablet Take 1 tablet (5 mg total) by mouth once daily. Take as directed with water on an empty stomach. 30 tablet 5    tadalafiL (CIALIS) 20 MG Tab Take 1 tablet (20 mg total) by mouth every other day. Take 1 hour before intercourse 20 tablet 11    traZODone (DESYREL) 50 MG tablet Take 1 tablet (50 mg total) by mouth nightly as needed for Insomnia. 90 tablet 1  "    Current Facility-Administered Medications   Medication Dose Route Frequency Provider Last Rate Last Admin    alprostadiL (PROSTIN VR) 50 mcg, phentolamine (REGITINE) 1 mg, papaverine 0.03 mg / 5 mL  0.1 mL Intracavernosal 1 time in Clinic/HOD Dale James MD        PHENYLephrine injection 800 mcg  800 mcg Intracavernosal Once Dale James MD         Review of Systems   Constitutional:  Positive for chills. Negative for fever, malaise/fatigue and weight loss.        Hot flashes at night   Respiratory:  Negative for cough and shortness of breath.    Cardiovascular:  Negative for chest pain, palpitations and leg swelling.   Gastrointestinal:  Negative for abdominal pain, constipation and diarrhea.   Genitourinary:  Negative for dysuria, flank pain, frequency, hematuria and urgency.   Musculoskeletal:  Negative for back pain, joint pain and neck pain.   Neurological:  Positive for tingling (numbness remains left middle/ring fingers). Negative for dizziness and weakness.          Objective:      Physical Exam:   /81 (BP Location: Left arm, Patient Position: Sitting)   Pulse (!) 59   Temp 98 °F (36.7 °C) (Oral)   Ht 6' 2" (1.88 m)   Wt 109.9 kg (242 lb 4.6 oz)   SpO2 96%   BMI 31.11 kg/m²       ECOG Performance status: (0) Fully active, able to carry on all predisease performance without restriction     Physical Exam  Constitutional:       General: He is not in acute distress.     Appearance: Normal appearance.   HENT:      Head: Normocephalic.   Eyes:      General: No scleral icterus.     Extraocular Movements: Extraocular movements intact.      Conjunctiva/sclera: Conjunctivae normal.   Cardiovascular:      Rate and Rhythm: Normal rate.   Pulmonary:      Effort: Pulmonary effort is normal. No respiratory distress.   Abdominal:      General: There is no distension.      Palpations: Abdomen is soft.   Skin:     General: Skin is warm and dry.   Neurological:      Mental Status: He is alert and " oriented to person, place, and time.      Motor: No weakness.   Psychiatric:         Mood and Affect: Mood normal.         Behavior: Behavior normal.         Thought Content: Thought content normal.          Recent Labs:   Lab Results   Component Value Date    WBC 7.26 04/10/2025    RBC 4.61 04/10/2025    HGB 13.4 (L) 04/10/2025    HCT 41.5 04/10/2025     04/10/2025     04/10/2025    K 4.1 04/10/2025     04/10/2025    CO2 26 04/10/2025     03/10/2025    BUN 23 (H) 04/10/2025    CREATININE 0.9 04/10/2025    CALCIUM 9.6 04/10/2025    BILITOT 0.3 04/10/2025    AST 30 04/10/2025    ALT 26 04/10/2025          Lab Results   Component Value Date    PSADIAG 0.23 02/24/2025    PSADIAG 0.84 01/13/2025    PSADIAG 1.0 10/24/2024    PSADIAG 0.73 03/28/2024    PSADIAG 0.05 08/02/2022    PSADIAG 0.05 05/04/2022    PSADIAG <0.01 12/14/2021    PSADIAG <0.01 09/09/2021    PSADIAG <0.01 06/16/2021    PSADIAG 0.01 02/25/2021    PSA <0.01 04/10/2025    PSA 0.01 03/24/2025    PSA 5.1 (H) 06/26/2020    PSA 3.9 04/11/2018    PSA 3.1 04/11/2017    PSA 2.6 11/02/2015    PSA 2.0 03/10/2014    PSA 1.5 03/23/2006        Cardiovascular Screening:  Primary care physician: Prashanth Torres MD      The 10-year ASCVD risk score (Geoffrey BIARRA, et al., 2019) is: 13.1%    Values used to calculate the score:      Age: 59 years      Sex: Male      Is Non- : Yes      Diabetic: No      Tobacco smoker: No      Systolic Blood Pressure: 130 mmHg      Is BP treated: Yes      HDL Cholesterol: 45 mg/dL      Total Cholesterol: 226 mg/dL    EKG:   Results for orders placed or performed during the hospital encounter of 11/06/20   EKG 12-lead    Collection Time: 11/06/20  9:29 AM    Narrative    Test Reason : PRE-OP    Vent. Rate : 054 BPM     Atrial Rate : 054 BPM     P-R Int : 156 ms          QRS Dur : 098 ms      QT Int : 418 ms       P-R-T Axes : 023 068 045 degrees     QTc Int : 396 ms    Sinus  bradycardia  Otherwise normal ECG    Confirmed by Shane ESCALANTE, Tao OSMAN ([id number]) on 11/7/2020 12:56:59 PM    Referred By:             Confirmed By:Toa Lynn MD       Body mass index is 31.11 kg/m².    Lab Results   Component Value Date    CHOL 226 (H) 03/28/2024    LDLCALC 170.4 (H) 03/28/2024    HDL 45 03/28/2024    TRIG 53 03/28/2024    HGBA1C 5.7 (H) 03/24/2025        Bone Health    Lab Results   Component Value Date    PGKBWRTH20JM 24 (L) 02/24/2025        No results found for this or any previous visit.      PSMA PET IMAGING+     No results found for this or any previous visit.       I have personally reviewed the above imaging.     Path:   Reviewed pathology as documented above.      Diagnoses:     1. Prostate cancer    2. Androgen-induced osteoporosis    3. Primary hypertension        Assessment and Plan:     1. Prostate cancer  Overview:  Biochemical and pelvic breta relapse of high risk prostate cancer. Initially underwent radical prostatectomy 01/20/2021 with pT3bN0 disease. PSA malcolm was <0.01. Had Rising PSA as of 05/2022, up to 1.0 10/2024. PSMA PET CT 01/2025 with tracer avid chuy-rectal soft tissue lesion c/f berta relapse.    Assessment & Plan:  Hand swelling has improved since stopping AAP. 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   - Con't ADT  - Next Lupron due 5/08/25  - patient not agreeable to restarting AAP, continue to HOLD, patient not interested in trying a different hormonal oral medication at this time  - FU with rad onc as planned  - PSA currently undetectable      2. Androgen-induced osteoporosis  Assessment & Plan:  - DEXA reviewed  - continue Ca/Vit D supplements      3. Primary hypertension  Overview:  - home meds include amlodipine, hydrochlorothiazide, and irbesartan           Melissa Voltz, APRN [facility] [id number] [place] [address]  Phone: o) [phone]          Follow up:   Route Chart for Scheduling    Med Onc Chart  Routing      Follow up with physician . As scheduled   Follow up with MANASA    Infusion scheduling note    Injection scheduling note as scheduled   Labs CBC, CMP and PSA   Scheduling:  Preferred lab:  Lab interval:  as scheduled   Imaging    Pharmacy appointment    Other referrals                   Treatment Plan Information   OP ABIRATERONE DAILY PREDNISONE BID Andrew Chin MD   Associated diagnosis: Prostate cancer Stage IIIB pT3b, pN0, cM0, PSA: 5.1, Grade Group: 2 noted on 11/11/2020   Line of treatment: First Line  Treatment Goal: Curative     Upcoming Treatment Dates - OP ABIRATERONE DAILY PREDNISONE BID    No upcoming days in selected categories.    Supportive Plan Information  OP PROSTATE LEUPROLIDE Q3MO Andrew Chin MD   Associated Diagnosis: Prostate cancer Stage IIIB pT3b, pN0, cM0, PSA: 5.1, Grade Group: 2 noted on 11/11/2020   Line of treatment: First Line   Treatment goal: Curative     Upcoming Treatment Dates - OP PROSTATE LEUPROLIDE Q3MO    5/8/2025       Chemotherapy       leuprolide (LUPRON) injection 22.5 mg  7/31/2025       Chemotherapy       leuprolide (LUPRON) injection 22.5 mg  10/23/2025       Chemotherapy       leuprolide (LUPRON) injection 22.5 mg  1/15/2026       Chemotherapy       leuprolide (LUPRON) injection 22.5 mg         The above information has been reviewed with the patient and all questions have been answered to their apparent satisfaction.  They understand that they can call the clinic with any questions.     code applied: patient requires or will require a continuous, longitudinal, and active collaborative plan of care related to this patient's health condition, prostate cancer --the management of which requires the direction of a practitioner with specialized clinical knowledge, skill, and expertise.

## 2025-04-11 NOTE — ASSESSMENT & PLAN NOTE
Hand swelling has improved since stopping AAP. 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   - Con't ADT  - Next Lupron due 5/08/25  - patient not agreeable to restarting AAP, continue to HOLD, patient not interested in trying a different hormonal oral medication at this time  - FU with rad onc as planned  - PSA currently undetectable

## 2025-04-19 ENCOUNTER — PATIENT MESSAGE (OUTPATIENT)
Dept: ADMINISTRATIVE | Facility: OTHER | Age: 60
End: 2025-04-19
Payer: COMMERCIAL

## 2025-04-30 DIAGNOSIS — G47.09 OTHER INSOMNIA: ICD-10-CM

## 2025-04-30 RX ORDER — TRAZODONE HYDROCHLORIDE 50 MG/1
50 TABLET ORAL NIGHTLY PRN
Qty: 90 TABLET | Refills: 0 | Status: SHIPPED | OUTPATIENT
Start: 2025-04-30

## 2025-04-30 NOTE — TELEPHONE ENCOUNTER
Provider Staff:  Action required for this patient    Requires appointment      Please see care gap opportunities below in Care Due Message.    Thanks!  [facility]     Appointments      Date Provider   Last Visit   3/19/2024 Prashanth Torres MD   Next Visit   Visit date not found Prashanth Torres MD     Refill Decision Note   Chapo Ohara  is requesting a refill authorization.  Brief Assessment and Rationale for Refill:  Approve     Medication Therapy Plan:         Comments:     Note composed:9:30 AM 04/30/2025

## 2025-04-30 NOTE — TELEPHONE ENCOUNTER
Care Due:                  Date            Visit Type   Department     Provider  --------------------------------------------------------------------------------                                [facility]                              FOLLOWUP/OF  / INTERNAL MED Prashanth Lagunas  Last Visit: 03-      FICE VISIT   / PEDS         [place]  Next Visit: None Scheduled  None         None Found                                                            Last  Test          Frequency    Reason                     Performed    Due Date  --------------------------------------------------------------------------------    Office Visit  15 months..  amLODIPine,                03- 06-                             hydroCHLOROthiazide,                             irbesartan, traZODone....    [employer] Embedded Care Due Messages. Reference number: [id number].   4/30/2025 7:40:44 AM CDT

## 2025-05-19 ENCOUNTER — INFUSION (OUTPATIENT)
Dept: INFUSION THERAPY | Facility: HOSPITAL | Age: 60
End: 2025-05-19
Payer: COMMERCIAL

## 2025-05-19 ENCOUNTER — OFFICE VISIT (OUTPATIENT)
Dept: HEMATOLOGY/ONCOLOGY | Facility: CLINIC | Age: 60
End: 2025-05-19
Payer: COMMERCIAL

## 2025-05-19 ENCOUNTER — LAB VISIT (OUTPATIENT)
Dept: LAB | Facility: OTHER | Age: 60
End: 2025-05-19
Attending: HOSPITALIST
Payer: COMMERCIAL

## 2025-05-19 ENCOUNTER — OFFICE VISIT (OUTPATIENT)
Dept: RADIATION ONCOLOGY | Facility: CLINIC | Age: 60
End: 2025-05-19
Attending: RADIOLOGY
Payer: COMMERCIAL

## 2025-05-19 VITALS
BODY MASS INDEX: 32.25 KG/M2 | HEART RATE: 63 BPM | HEIGHT: 74 IN | SYSTOLIC BLOOD PRESSURE: 118 MMHG | TEMPERATURE: 97 F | WEIGHT: 251.31 LBS | DIASTOLIC BLOOD PRESSURE: 78 MMHG | RESPIRATION RATE: 18 BRPM | OXYGEN SATURATION: 98 %

## 2025-05-19 VITALS
BODY MASS INDEX: 32.19 KG/M2 | HEART RATE: 62 BPM | SYSTOLIC BLOOD PRESSURE: 118 MMHG | DIASTOLIC BLOOD PRESSURE: 68 MMHG | OXYGEN SATURATION: 96 % | WEIGHT: 250.69 LBS

## 2025-05-19 DIAGNOSIS — C61 PROSTATE CANCER: Primary | ICD-10-CM

## 2025-05-19 DIAGNOSIS — C61 PROSTATE CANCER: ICD-10-CM

## 2025-05-19 DIAGNOSIS — I10 PRIMARY HYPERTENSION: ICD-10-CM

## 2025-05-19 DIAGNOSIS — T50.905A HOT FLASH DUE TO MEDICATION: ICD-10-CM

## 2025-05-19 DIAGNOSIS — C77.5 MALIGNANT NEOPLASM METASTATIC TO INTRAPELVIC LYMPH NODE: ICD-10-CM

## 2025-05-19 DIAGNOSIS — R23.2 HOT FLASH DUE TO MEDICATION: ICD-10-CM

## 2025-05-19 PROBLEM — T38.7X5A: Status: RESOLVED | Noted: 2025-02-24 | Resolved: 2025-05-19

## 2025-05-19 PROBLEM — M81.8: Status: RESOLVED | Noted: 2025-02-24 | Resolved: 2025-05-19

## 2025-05-19 PROBLEM — C77.9 METASTASIS TO LYMPH NODES: Status: ACTIVE | Noted: 2025-05-19

## 2025-05-19 LAB
ABSOLUTE NEUTROPHIL COUNT (OHS): 2.43 K/UL (ref 1.8–7.7)
ALBUMIN SERPL BCP-MCNC: 4.1 G/DL (ref 3.5–5.2)
ALP SERPL-CCNC: 47 UNIT/L (ref 40–150)
ALT SERPL W/O P-5'-P-CCNC: 24 UNIT/L (ref 10–44)
ANION GAP (OHS): 6 MMOL/L (ref 8–16)
AST SERPL-CCNC: 28 UNIT/L (ref 11–45)
BILIRUB SERPL-MCNC: 0.3 MG/DL (ref 0.1–1)
BUN SERPL-MCNC: 13 MG/DL (ref 6–20)
CALCIUM SERPL-MCNC: 9.7 MG/DL (ref 8.7–10.5)
CHLORIDE SERPL-SCNC: 105 MMOL/L (ref 95–110)
CO2 SERPL-SCNC: 27 MMOL/L (ref 23–29)
CREAT SERPL-MCNC: 0.9 MG/DL (ref 0.5–1.4)
ERYTHROCYTE [DISTWIDTH] IN BLOOD BY AUTOMATED COUNT: 14.4 % (ref 11.5–14.5)
GFR SERPLBLD CREATININE-BSD FMLA CKD-EPI: >60 ML/MIN/1.73/M2
GLUCOSE SERPL-MCNC: 110 MG/DL (ref 70–110)
HCT VFR BLD AUTO: 40.5 % (ref 40–54)
HGB BLD-MCNC: 13.5 GM/DL (ref 14–18)
IMM GRANULOCYTES # BLD AUTO: 0.01 K/UL (ref 0–0.04)
MCH RBC QN AUTO: 29.9 PG (ref 27–31)
MCHC RBC AUTO-ENTMCNC: 33.3 G/DL (ref 32–36)
MCV RBC AUTO: 90 FL (ref 82–98)
PLATELET # BLD AUTO: 250 K/UL (ref 150–450)
PMV BLD AUTO: 10.5 FL (ref 9.2–12.9)
POTASSIUM SERPL-SCNC: 3.9 MMOL/L (ref 3.5–5.1)
PROT SERPL-MCNC: 7.7 GM/DL (ref 6–8.4)
PSA SERPL-MCNC: <0.01 NG/ML
RBC # BLD AUTO: 4.51 M/UL (ref 4.6–6.2)
SODIUM SERPL-SCNC: 138 MMOL/L (ref 136–145)
WBC # BLD AUTO: 4.69 K/UL (ref 3.9–12.7)

## 2025-05-19 PROCEDURE — 80053 COMPREHEN METABOLIC PANEL: CPT

## 2025-05-19 PROCEDURE — 36415 COLL VENOUS BLD VENIPUNCTURE: CPT

## 2025-05-19 PROCEDURE — 99999 PR PBB SHADOW E&M-EST. PATIENT-LVL IV: CPT | Mod: PBBFAC,,, | Performed by: HOSPITALIST

## 2025-05-19 PROCEDURE — 1159F MED LIST DOCD IN RCRD: CPT | Mod: CPTII,S$GLB,, | Performed by: RADIOLOGY

## 2025-05-19 PROCEDURE — 1160F RVW MEDS BY RX/DR IN RCRD: CPT | Mod: CPTII,S$GLB,, | Performed by: RADIOLOGY

## 2025-05-19 PROCEDURE — 3008F BODY MASS INDEX DOCD: CPT | Mod: CPTII,S$GLB,, | Performed by: HOSPITALIST

## 2025-05-19 PROCEDURE — 99212 OFFICE O/P EST SF 10 MIN: CPT | Mod: S$GLB,,, | Performed by: RADIOLOGY

## 2025-05-19 PROCEDURE — 3008F BODY MASS INDEX DOCD: CPT | Mod: CPTII,S$GLB,, | Performed by: RADIOLOGY

## 2025-05-19 PROCEDURE — 63600175 PHARM REV CODE 636 W HCPCS: Mod: JZ,TB | Performed by: HOSPITALIST

## 2025-05-19 PROCEDURE — 96402 CHEMO HORMON ANTINEOPL SQ/IM: CPT

## 2025-05-19 PROCEDURE — 99215 OFFICE O/P EST HI 40 MIN: CPT | Mod: S$GLB,,, | Performed by: HOSPITALIST

## 2025-05-19 PROCEDURE — 3078F DIAST BP <80 MM HG: CPT | Mod: CPTII,S$GLB,, | Performed by: RADIOLOGY

## 2025-05-19 PROCEDURE — 84153 ASSAY OF PSA TOTAL: CPT

## 2025-05-19 PROCEDURE — G2211 COMPLEX E/M VISIT ADD ON: HCPCS | Mod: S$GLB,,, | Performed by: HOSPITALIST

## 2025-05-19 PROCEDURE — 99999 PR PBB SHADOW E&M-EST. PATIENT-LVL III: CPT | Mod: PBBFAC,,, | Performed by: RADIOLOGY

## 2025-05-19 PROCEDURE — 85027 COMPLETE CBC AUTOMATED: CPT

## 2025-05-19 PROCEDURE — 3044F HG A1C LEVEL LT 7.0%: CPT | Mod: CPTII,S$GLB,, | Performed by: HOSPITALIST

## 2025-05-19 PROCEDURE — 3078F DIAST BP <80 MM HG: CPT | Mod: CPTII,S$GLB,, | Performed by: HOSPITALIST

## 2025-05-19 PROCEDURE — 4010F ACE/ARB THERAPY RXD/TAKEN: CPT | Mod: CPTII,S$GLB,, | Performed by: RADIOLOGY

## 2025-05-19 PROCEDURE — 3074F SYST BP LT 130 MM HG: CPT | Mod: CPTII,S$GLB,, | Performed by: RADIOLOGY

## 2025-05-19 PROCEDURE — 4010F ACE/ARB THERAPY RXD/TAKEN: CPT | Mod: CPTII,S$GLB,, | Performed by: HOSPITALIST

## 2025-05-19 PROCEDURE — 3044F HG A1C LEVEL LT 7.0%: CPT | Mod: CPTII,S$GLB,, | Performed by: RADIOLOGY

## 2025-05-19 PROCEDURE — 3074F SYST BP LT 130 MM HG: CPT | Mod: CPTII,S$GLB,, | Performed by: HOSPITALIST

## 2025-05-19 RX ORDER — DESVENLAFAXINE 50 MG/1
50 TABLET, FILM COATED, EXTENDED RELEASE ORAL DAILY
Qty: 30 TABLET | Refills: 11 | Status: SHIPPED | OUTPATIENT
Start: 2025-05-19 | End: 2026-05-19

## 2025-05-19 RX ADMIN — LEUPROLIDE ACETATE 22.5 MG: KIT at 02:05

## 2025-05-19 NOTE — NURSING
Pt given IM Lupron injection to right buttock. Tolerated well. Care explained, all questions answered.

## 2025-05-19 NOTE — ASSESSMENT & PLAN NOTE
- Con't ADT  - Next Lupron due 5/08/25  - Patient not agreeable to restarting AAP, continue to HOLD, patient not interested in trying a different hormonal oral medication at this time  - FU with rad onc as planned  - PSA currently undetectable   - FU 12 weeks

## 2025-05-19 NOTE — PROGRESS NOTES
[facility] - Radiation Oncology     Patient ID: Chapo Ohara is a 60 y.o. male.    Chief Complaint: Follow-up      Mr. Ohara pathologic stage IIIB (T3b, N0, M0, R1, GG2, PSA < 10) prostate cancer with berta relapse.  He initially presented in 2020 with an elevated PSA of 5.1 ng/ml.  Biopsies revealed Paolo 6 (3+3) involving 6/6 cores from the Lt. lobe of the prostate.  He subsequently underwent RALP with bilateral node dissection on  1/20/21.  Pathology revealed a 57 g prostate measuring 5,5 x 4 x 3.5 cm . There was Paolo 7 (3+4) adenocarcinoma involving 10% of the prostate.  The Orient pattern 4 accounted for 20% of the tumor.  There was involvement of the Lt. seminal vesicle and margin at the apex.  There was no extraprostatic extension or bladder neck or lymphovascular invasion.  There was perineural invasion.  Eleven lymph nodes (10 Rt., 1 Lt.) were negative.  Postoperative PSA remained < 0.01 ng/ml until May of 2022 when it returned at 0.05 ng/ml. Repeat PSA in March of 2024 was 0.73 ng/ml.  PSA in October increased to 1.0 ng.ml.  PSMA scan revealed a soft tissue density with increased radiotracer uptake adjacent to the left side of the rectum measuring 1.5 x 1.0 cm, SUV max 4.6 (axial image 262), possibly representing pelvic lymph node. He began hormonal therapy.  Today the patient states he feels fairly well.  His abiraterone was discontinued secondary to hand swelling and numbness.        Review of Systems   Constitutional:  Negative for activity change, appetite change, chills and fatigue. Fever: notes hot flashes..  Gastrointestinal:  Negative for constipation, diarrhea and fecal incontinence.   Genitourinary:  Negative for bladder incontinence, difficulty urinating, dysuria, frequency and hematuria.       Physical Exam  Constitutional:       General: He is not in acute distress.     Appearance: Normal appearance.   Neurological:      Mental Status: He is alert and oriented  to person, place, and time.   Psychiatric:         Mood and Affect: Mood normal.         Judgment: Judgment normal.        Latest Reference Range & Units 02/24/25 12:02 03/24/25 12:05 04/10/25 08:12   Prostate Specific Antigen <=4.00 ng/mL  0.01 <0.01   PSA Diagnostic 0.00 - 4.00 ng/mL 0.23            Assessment and Plan   Recurrent prostate cancer - reviewed the rational for salvage radiotherapy to the prostate bed and pelvic nodes.  Discussed the procedures, risks and benefits of therapy.   Discussed the acute and long term side effects of therapy.  The patient was agreeable to proceed with radiotherapy.  Plan simulation.

## 2025-05-19 NOTE — PROGRESS NOTES
[facility]: Established patient visit  Best Contact Phone Number(s): [phone] (home)       Cancer/Stage/TNM:    Cancer Staging   Prostate cancer  Staging form: Prostate, AJCC 8th Edition  - Pathologic stage from 10/24/2024: Stage IIIB (pT3b, pN0, cM0, PSA: 5.1, Grade Group: 2) - Signed by Antoine Warner Jr., MD on 10/24/2024        Reason for visit:  Pelvic berta relapse of of high risk prostate cancer    Molecular:  Germline Testing: Negative    Densitometry:  02/17/25: Normal    Treatment History:   01/20/2021  Radical prostatectomy  01/13/25 - 2/13/25 Bicalutamide  02/24/25 -   Leuprolide q 3 months  02/24/24 - 04/2025 Abiraterone + prednisone     HPI:   Chapo Ohara is a 60 y.o. male with biochemical and pelvic berta relapse of high risk prostate cancer. Initially underwent radical prostatectomy 01/20/2021 with pT3bN0 disease. PSA malcolm was <0.01. Had rising PSA as of 05/2022, up to 1.0 10/2024. PSMA PET CT 01/2025 with tracer avid chuy-rectal soft tissue lesion c/f berta relapse. He started ADT + AAP 02/2025; later stopped . He presents to medical oncology clinic for routine follow up.    Interval history:    Stopped abiraterone 4/21/25 and prednisone 5/5/25. Had severe neuropathic pain in both hands. Since stopping abiraterone pain has signficantly improved aside from persistent neuropathy in thrid and fourth fingsree on the left.     Hot flashes have signficantly worsened since stopping abiraterone - affecting his sleep.  Occur 2-3x per night and preventing him from sleep. Continues during the day, but more tolerable. Remains not interested in starting alteranate ARPI.    Planning on starting radiation this Thursday. Modest stress inctontinence. Wears a shield but doesn't have to change thrghout hthe day. Energy is lower but remains active and working full time. Reports normal bowel movements.          Oncology History   Prostate cancer   11/11/2020 Initial Diagnosis    Prostate  cancer     1/20/2021 Surgery    01/20/21 Radical prostatectomy. Prostate adenocarcinoma. [place] 3+4. Positive surgical margin. No EPE. Left SV involved. Additional PNI. 0/10LN. pT3bN0     12/2021 Tumor Markers    12/14/21: PSA malcolm <0.01     8/2022 Tumor Markers    08/02/22: PSA 0.05     10/24/2024 Cancer Staged    Staging form: Prostate, AJCC 8th Edition  - Pathologic stage from 10/24/2024: Stage IIIB (pT3b, pN0, cM0, PSA: 5.1, Grade Group: 2)     10/2024 Tumor Markers    10/24/24: PSA 1.0     1/8/2025 Imaging Significant Findings    01/08/25 PSMA PET CT  Impression:  - Postoperative changes with no tracer avid foci in the prostate bed .  - Tracer avid soft tissue lesion, possible lymph node or inflammation, in the pelvis adjacent to the left side of the rectum.  Attention to follow     2/6/2025 -  Chemotherapy    Treatment Summary   Plan Name: OP ABIRATERONE DAILY PREDNISONE BID  Treatment Goal: Curative  Status: Active  Start Date: 2/6/2025  End Date: 2/6/2025  Provider: Andrew Chin MD  Chemotherapy: abiraterone (ZYTIGA) 250 mg Tab, 250 mg, Oral, Daily, 1 of 1 cycle, Start date: 2/6/2025, End date: 2/6/2026           Past Medical History:   Diagnosis Date    History of colonic polyps 04/11/2017    HTN (hypertension) 03/07/2014    Hyperlipidemia 03/07/2014    patient states he does not take medication    Prostate cancer          Past Surgical History:   Procedure Laterality Date    COLONOSCOPY N/A 12/11/2015    Procedure: COLONOSCOPY;  Surgeon: Balta Farah MD;  Location: [facility];  Service: Endoscopy;  Laterality: N/A;    COLONOSCOPY N/A 3/10/2022    Procedure: COLONOSCOPY;  Surgeon: Natalia Mcqueen MD;  Location: [facility];  Service: Endoscopy;  Laterality: N/A;  1/26 covid test 3/7 @ [place]; instructions to portal-st    CYSTOSCOPY WITH URODYNAMIC TESTING N/A 11/28/2022    Procedure: CYSTOSCOPY, WITH URODYNAMIC TESTING;  Surgeon: Jason Castaneda MD;  Location: [facility] OR 1ST FLR;  Service: Urology;   Laterality: N/A;  1hr    INSERTION OF SLING FOR MALE URINARY INCONTINENCE N/A 10/18/2022    Procedure: INSERTION, BLADDER SLING, MALE, FOR URINARY INCONTINENCE;  Surgeon: Jason Castaneda MD;  Location: [facility] OR 2ND FLR;  Service: Urology;  Laterality: N/A;    PENILE PROSTHESIS IMPLANT N/A 10/18/2022    Procedure: INSERTION, PENILE PROSTHESIS;  Surgeon: Td El MD;  Location: [facility] OR 2ND FLR;  Service: Urology;  Laterality: N/A;  1.5  hours    PROSTATE BIOPSY      ROBOT-ASSISTED LAPAROSCOPIC PELVIC LYMPHADENECTOMY USING DA ERNIE XI Bilateral 1/20/2021    Procedure: XI ROBOTIC LYMPHADENECTOMY, PELVIC;  Surgeon: BOOGIE Hammond MD;  Location: [facility];  Service: Urology;  Laterality: Bilateral;    ROBOT-ASSISTED LAPAROSCOPIC PROSTATECTOMY USING DA ERNIE XI N/A 1/20/2021    Procedure: XI ROBOTIC PROSTATECTOMY;  Surgeon: BOOGIE Hammond MD;  Location: [facility];  Service: Urology;  Laterality: N/A;    trus/bx 2020      TUMOR REMOVAL      back of head         Review of patient's allergies indicates:  No Known Allergies      Current Outpatient Medications   Medication Sig Dispense Refill    amLODIPine (NORVASC) 10 MG tablet Take 1 tablet (10 mg total) by mouth once daily. 90 tablet 12    calcium carbonate (TUMS) 200 mg calcium (500 mg) chewable tablet Take 2 tablets (1,000 mg total) by mouth once daily. 180 tablet 3    cholecalciferol, vitamin D3, (VITAMIN D3) 25 mcg (1,000 unit) capsule Take 1 capsule (1,000 Units total) by mouth once daily. 30 capsule 11    desvenlafaxine succinate (PRISTIQ) 50 MG Tb24 Take 1 tablet (50 mg total) by mouth once daily. 30 tablet 11    hydroCHLOROthiazide (HYDRODIURIL) 25 MG tablet Take 1 tablet (25 mg total) by mouth once daily. 90 tablet 11    irbesartan (AVAPRO) 300 MG tablet Take 1 tablet (300 mg total) by mouth every evening. 90 tablet 12    ketoconazole (NIZORAL) 2 % shampoo Apply topically twice a week. 500 mL 12    tadalafiL (CIALIS) 20 MG Tab Take 1 tablet (20 mg  "total) by mouth every other day. Take 1 hour before intercourse 20 tablet 11    traZODone (DESYREL) 50 MG tablet TAKE 1 TABLET(50 MG) BY MOUTH EVERY NIGHT AS NEEDED FOR INSOMNIA 90 tablet 0     Current Facility-Administered Medications   Medication Dose Route Frequency Provider Last Rate Last Admin    alprostadiL (PROSTIN VR) 50 mcg, phentolamine (REGITINE) 1 mg, papaverine 0.03 mg / 5 mL  0.1 mL Intracavernosal 1 time in Clinic/HOD Dale James MD        PHENYLephrine injection 800 mcg  800 mcg Intracavernosal Once Dale James MD         Facility-Administered Medications Ordered in Other Visits   Medication Dose Route Frequency Provider Last Rate Last Admin    leuprolide (LUPRON) injection 22.5 mg  22.5 mg Intramuscular 1 time in Clinic/HOD Andrew Chin MD                Objective:      Physical Exam:   /78 (BP Location: Left arm, Patient Position: Sitting)   Pulse 63   Temp 97 °F (36.1 °C) (Temporal)   Resp 18   Ht 6' 2" (1.88 m)   Wt 114 kg (251 lb 5.2 oz)   SpO2 98%   BMI 32.27 kg/m²       ECOG Performance status: (0) Fully active, able to carry on all predisease performance without restriction     Physical Exam  Constitutional:       General: He is not in acute distress.     Appearance: Normal appearance.   HENT:      Head: Normocephalic.   Eyes:      General: No scleral icterus.     Extraocular Movements: Extraocular movements intact.      Conjunctiva/sclera: Conjunctivae normal.   Cardiovascular:      Rate and Rhythm: Normal rate.   Pulmonary:      Effort: Pulmonary effort is normal. No respiratory distress.   Abdominal:      General: There is no distension.      Palpations: Abdomen is soft.   Skin:     General: Skin is warm and dry.   Neurological:      Mental Status: He is alert and oriented to person, place, and time.      Motor: No weakness.   Psychiatric:         Mood and Affect: Mood normal.         Behavior: Behavior normal.         Thought Content: Thought content " normal.          Recent Labs:   Lab Results   Component Value Date    WBC 4.69 05/19/2025    RBC 4.51 (L) 05/19/2025    HGB 13.5 (L) 05/19/2025    HCT 40.5 05/19/2025     05/19/2025     05/19/2025    K 3.9 05/19/2025     05/19/2025    CO2 27 05/19/2025     05/19/2025    BUN 13 05/19/2025    CREATININE 0.9 05/19/2025    CALCIUM 9.7 05/19/2025    BILITOT 0.3 05/19/2025    AST 28 05/19/2025    ALT 24 05/19/2025          Lab Results   Component Value Date    PSADIAG 0.23 02/24/2025    PSADIAG 0.84 01/13/2025    PSADIAG 1.0 10/24/2024    PSADIAG 0.73 03/28/2024    PSADIAG 0.05 08/02/2022    PSADIAG 0.05 05/04/2022    PSADIAG <0.01 12/14/2021    PSADIAG <0.01 09/09/2021    PSADIAG <0.01 06/16/2021    PSADIAG 0.01 02/25/2021    PSA <0.01 04/10/2025    PSA 0.01 03/24/2025    PSA 5.1 (H) 06/26/2020    PSA 3.9 04/11/2018    PSA 3.1 04/11/2017    PSA 2.6 11/02/2015    PSA 2.0 03/10/2014    PSA 1.5 03/23/2006        Cardiovascular Screening:  Primary care physician: Prashanth Torres MD      The 10-year ASCVD risk score (Geoffrey IBARRA, et al., 2019) is: 11.4%    Values used to calculate the score:      Age: 60 years      Sex: Male      Is Non- : Yes      Diabetic: No      Tobacco smoker: No      Systolic Blood Pressure: 118 mmHg      Is BP treated: Yes      HDL Cholesterol: 45 mg/dL      Total Cholesterol: 226 mg/dL    EKG:   Results for orders placed or performed during the hospital encounter of 11/06/20   EKG 12-lead    Collection Time: 11/06/20  9:29 AM    Narrative    Test Reason : PRE-OP    Vent. Rate : 054 BPM     Atrial Rate : 054 BPM     P-R Int : 156 ms          QRS Dur : 098 ms      QT Int : 418 ms       P-R-T Axes : 023 068 045 degrees     QTc Int : 396 ms    Sinus bradycardia  Otherwise normal ECG    Confirmed by Shane ESCALANTE, Tao OSMAN ([id number]) on 11/7/2020 12:56:59 PM    Referred By:             Confirmed By:Tao Lynn MD       Body mass index is 32.27  kg/m².    Lab Results   Component Value Date    CHOL 226 (H) 03/28/2024    LDLCALC 170.4 (H) 03/28/2024    HDL 45 03/28/2024    TRIG 53 03/28/2024    HGBA1C 5.7 (H) 03/24/2025        Bone Health    Lab Results   Component Value Date    LUTMOWMB89EF 24 (L) 02/24/2025        No results found for this or any previous visit.      PSMA PET IMAGING+     No results found for this or any previous visit.       I have personally reviewed the above imaging.     Path:   Reviewed pathology as documented above.      Diagnoses:     1. Prostate cancer    2. Hot flash due to medication    3. 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 Primary hypertension          Assessment and Plan:     1. Prostate cancer  Overview:  Biochemical and pelvic berta relapse of high risk prostate cancer. Initially underwent radical prostatectomy 01/20/2021 with pT3bN0 disease. PSA malcolm was <0.01. Had Rising PSA as of 05/2022, up to 1.0 10/2024. PSMA PET CT 01/2025 with tracer avid chuy-rectal soft tissue lesion c/f berta relapse.    Assessment & Plan:  - Con't ADT  - Next Lupron due 5/08/25  - Patient not agreeable to restarting AAP, continue to HOLD, patient not interested in trying a different hormonal oral medication at this time  - FU with rad onc as planned  - PSA currently undetectable   - FU 12 weeks      2. Hot flash due to medication  -     desvenlafaxine succinate (PRISTIQ) 50 MG Tb24; Take 1 tablet (50 mg total) by mouth once daily.  Dispense: 30 tablet; Refill: 11    3. 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 Primary hypertension  Overview:  - home meds include amlodipine, hydrochlorothiazide, and irbesartan    Assessment & Plan:  - Good control goday      Other orders  -     Cancel: leuprolide (LUPRON) injection 22.5 mg          Follow up:   Route Chart for Scheduling    Med Onc Chart Routing      Follow up with physician    Follow up with MANASA 3 months.    Infusion scheduling note    Injection scheduling note Lupron 3 months   Labs CBC, CMP and PSA   Scheduling:  Preferred lab:  Lab interval:     Imaging    Pharmacy appointment    Other referrals                 Treatment Plan Information   OP ABIRATERONE DAILY PREDNISONE BID Andrew Chin MD   Associated diagnosis: Prostate cancer Stage IIIB pT3b, pN0, cM0, PSA: 5.1, Grade Group: 2 noted on 11/11/2020   Line of treatment: First Line  Treatment Goal: Curative     Upcoming Treatment Dates - OP ABIRATERONE DAILY PREDNISONE BID    No upcoming days in selected categories.    Supportive Plan Information  OP PROSTATE LEUPROLIDE Q3MO Andrew Chin MD   Associated Diagnosis: Prostate cancer Stage IIIB pT3b, pN0, cM0, PSA: 5.1, Grade Group: 2 noted on 11/11/2020   Line of treatment: First Line   Treatment goal: Curative     Upcoming Treatment Dates - OP PROSTATE LEUPROLIDE Q3MO    8/11/2025       Chemotherapy       leuprolide (LUPRON) injection 22.5 mg  11/3/2025       Chemotherapy       leuprolide (LUPRON) injection 22.5 mg  1/26/2026       Chemotherapy       leuprolide (LUPRON) injection 22.5 mg  4/20/2026       Chemotherapy       leuprolide (LUPRON) injection 22.5 mg         The above information has been reviewed with the patient and all questions have been answered to their apparent satisfaction.  They understand that they can call the clinic with any questions.     code applied: patient requires or will require a continuous, longitudinal, and active collaborative plan of care related to this patient's health condition, prostate cancer --the management of which requires the direction of a practitioner with specialized clinical knowledge, skill, and expertise.        Andrew Chin MD MPH  Staff Physician     [facility] [address] [address]  Email: [email or web address]  Phone: o) [phone] (c) [phone]

## 2025-05-22 ENCOUNTER — HOSPITAL ENCOUNTER (OUTPATIENT)
Dept: RADIATION THERAPY | Facility: HOSPITAL | Age: 60
Discharge: HOME | End: 2025-05-22
Payer: COMMERCIAL

## 2025-06-02 ENCOUNTER — HOSPITAL ENCOUNTER (OUTPATIENT)
Dept: RADIATION THERAPY | Facility: HOSPITAL | Age: 60
Discharge: HOME | End: 2025-06-02
Attending: RADIOLOGY
Payer: COMMERCIAL

## 2025-06-02 PROCEDURE — 77301 RADIOTHERAPY DOSE PLAN IMRT: CPT | Mod: TC | Performed by: RADIOLOGY

## 2025-06-02 PROCEDURE — 77301 RADIOTHERAPY DOSE PLAN IMRT: CPT | Mod: 26,,, | Performed by: RADIOLOGY

## 2025-06-03 PROCEDURE — 77338 DESIGN MLC DEVICE FOR IMRT: CPT | Mod: 26,,, | Performed by: RADIOLOGY

## 2025-06-03 PROCEDURE — 77300 RADIATION THERAPY DOSE PLAN: CPT | Mod: TC | Performed by: RADIOLOGY

## 2025-06-03 PROCEDURE — 77300 RADIATION THERAPY DOSE PLAN: CPT | Mod: 26,,, | Performed by: RADIOLOGY

## 2025-06-03 PROCEDURE — 77338 DESIGN MLC DEVICE FOR IMRT: CPT | Mod: TC | Performed by: RADIOLOGY

## 2025-06-09 PROCEDURE — 77385 HC IMRT, SIMPLE: CPT | Performed by: RADIOLOGY

## 2025-06-09 PROCEDURE — 77014 PR  CT GUIDANCE PLACEMENT RAD THERAPY FIELDS: CPT | Mod: 26,,, | Performed by: RADIOLOGY

## 2025-06-10 ENCOUNTER — DOCUMENTATION ONLY (OUTPATIENT)
Dept: RADIATION ONCOLOGY | Facility: CLINIC | Age: 60
End: 2025-06-10
Payer: COMMERCIAL

## 2025-06-10 PROCEDURE — 77385 HC IMRT, SIMPLE: CPT | Performed by: RADIOLOGY

## 2025-06-10 PROCEDURE — 77014 PR  CT GUIDANCE PLACEMENT RAD THERAPY FIELDS: CPT | Mod: 26,,, | Performed by: RADIOLOGY

## 2025-06-10 NOTE — PLAN OF CARE
Day 2 of outpatient radiation to the prostate bed. Doing well. Denies pain. No dysuria or hematuria. Nocturia x 2. LBM, today, wnl.

## 2025-06-11 PROCEDURE — 77385 HC IMRT, SIMPLE: CPT | Performed by: RADIOLOGY

## 2025-06-11 PROCEDURE — 77014 PR  CT GUIDANCE PLACEMENT RAD THERAPY FIELDS: CPT | Mod: 26,,, | Performed by: RADIOLOGY

## 2025-06-17 ENCOUNTER — DOCUMENTATION ONLY (OUTPATIENT)
Dept: RADIATION ONCOLOGY | Facility: CLINIC | Age: 60
End: 2025-06-17
Payer: COMMERCIAL

## 2025-06-17 NOTE — PLAN OF CARE
Day 7 of outpatient radiation to prostate. Denies hematuria, dysuria, nocturia and diarrhea. Tolerating treatment.

## 2025-06-24 ENCOUNTER — DOCUMENTATION ONLY (OUTPATIENT)
Dept: RADIATION ONCOLOGY | Facility: CLINIC | Age: 60
End: 2025-06-24
Payer: COMMERCIAL

## 2025-06-24 NOTE — PLAN OF CARE
Day 12 of outpatient radiation to the prostate bed. Doing well. Denies pain. No dysuria or hematuria. Nocturia x 2. No change in BM.

## 2025-07-01 ENCOUNTER — HOSPITAL ENCOUNTER (OUTPATIENT)
Dept: RADIATION THERAPY | Facility: HOSPITAL | Age: 60
Discharge: HOME | End: 2025-07-01
Attending: RADIOLOGY
Payer: COMMERCIAL

## 2025-07-01 ENCOUNTER — DOCUMENTATION ONLY (OUTPATIENT)
Dept: RADIATION ONCOLOGY | Facility: CLINIC | Age: 60
End: 2025-07-01
Payer: COMMERCIAL

## 2025-07-01 NOTE — PLAN OF CARE
Day 19 of outpatient radiation to the prostate bed. No dysuria or hematuria. Nocturia x 2. No diarrhea.

## 2025-07-08 ENCOUNTER — DOCUMENTATION ONLY (OUTPATIENT)
Dept: RADIATION ONCOLOGY | Facility: CLINIC | Age: 60
End: 2025-07-08
Payer: COMMERCIAL

## 2025-07-08 NOTE — TELEPHONE ENCOUNTER
General Sunscreen Counseling: I recommended a broad spectrum sunscreen with a SPF of 30 or higher.  I explained that SPF 30 sunscreens block approximately 97 percent of the sun's harmful rays.  Sunscreens should be applied at least 15 minutes prior to expected sun exposure and then every 2 hours after that as long as sun exposure continues. If swimming or exercising sunscreen should be reapplied every 45 minutes to an hour after getting wet or sweating.  One ounce, or the equivalent of a shot glass full of sunscreen, is adequate to protect the skin not covered by a bathing suit. I also recommended a lip balm with a sunscreen as well. Sun protective clothing can be used in lieu of sunscreen but must be worn the entire time you are exposed to the sun's rays. This patient has a referral placed with Dr kirkpatrick  Please assist with scheduling   Tks se Kumar RN   Detail Level: Detailed Products Recommended: Try zinc oxide or titanium dioxide

## 2025-07-15 ENCOUNTER — DOCUMENTATION ONLY (OUTPATIENT)
Dept: RADIATION ONCOLOGY | Facility: CLINIC | Age: 60
End: 2025-07-15
Payer: COMMERCIAL

## 2025-07-16 NOTE — PLAN OF CARE
Day 31 of outpatient radiation to the prostate bed. Doing well. Denies pain. No dysuria or hematuria. Nocturia x 8. No diarrhea.

## 2025-08-11 ENCOUNTER — INFUSION (OUTPATIENT)
Dept: INFUSION THERAPY | Facility: HOSPITAL | Age: 60
End: 2025-08-11
Payer: COMMERCIAL

## 2025-08-11 ENCOUNTER — OFFICE VISIT (OUTPATIENT)
Dept: HEMATOLOGY/ONCOLOGY | Facility: CLINIC | Age: 60
End: 2025-08-11
Payer: COMMERCIAL

## 2025-08-11 ENCOUNTER — LAB VISIT (OUTPATIENT)
Dept: LAB | Facility: HOSPITAL | Age: 60
End: 2025-08-11
Attending: HOSPITALIST
Payer: COMMERCIAL

## 2025-08-11 VITALS
OXYGEN SATURATION: 98 % | HEART RATE: 60 BPM | DIASTOLIC BLOOD PRESSURE: 83 MMHG | WEIGHT: 257.94 LBS | HEIGHT: 74 IN | BODY MASS INDEX: 33.1 KG/M2 | SYSTOLIC BLOOD PRESSURE: 140 MMHG

## 2025-08-11 DIAGNOSIS — C80.1 NEUROPATHY ASSOCIATED WITH CANCER: ICD-10-CM

## 2025-08-11 DIAGNOSIS — R23.2 HOT FLASHES: ICD-10-CM

## 2025-08-11 DIAGNOSIS — C61 PROSTATE CANCER: ICD-10-CM

## 2025-08-11 DIAGNOSIS — C61 PROSTATE CANCER: Primary | ICD-10-CM

## 2025-08-11 DIAGNOSIS — G63 NEUROPATHY ASSOCIATED WITH CANCER: ICD-10-CM

## 2025-08-11 DIAGNOSIS — R23.2 HOT FLASH DUE TO MEDICATION: ICD-10-CM

## 2025-08-11 DIAGNOSIS — T50.905A HOT FLASH DUE TO MEDICATION: ICD-10-CM

## 2025-08-11 DIAGNOSIS — C77.5 MALIGNANT NEOPLASM METASTATIC TO INTRAPELVIC LYMPH NODE: ICD-10-CM

## 2025-08-11 DIAGNOSIS — I10 PRIMARY HYPERTENSION: ICD-10-CM

## 2025-08-11 DIAGNOSIS — Z79.899 LONG TERM CURRENT USE OF THERAPEUTIC DRUG: ICD-10-CM

## 2025-08-11 DIAGNOSIS — Z79.818 ENCOUNTER FOR MONITORING ANDROGEN DEPRIVATION THERAPY: ICD-10-CM

## 2025-08-11 LAB
ABSOLUTE NEUTROPHIL COUNT (OHS): 2.27 K/UL (ref 1.8–7.7)
ALBUMIN SERPL BCP-MCNC: 4 G/DL (ref 3.5–5.2)
ALP SERPL-CCNC: 58 UNIT/L (ref 40–150)
ALT SERPL W/O P-5'-P-CCNC: 33 UNIT/L (ref 0–55)
ANION GAP (OHS): 6 MMOL/L (ref 8–16)
AST SERPL-CCNC: 30 UNIT/L (ref 0–50)
BILIRUB SERPL-MCNC: 0.3 MG/DL (ref 0.1–1)
BUN SERPL-MCNC: 14 MG/DL (ref 6–20)
CALCIUM SERPL-MCNC: 9.4 MG/DL (ref 8.7–10.5)
CHLORIDE SERPL-SCNC: 108 MMOL/L (ref 95–110)
CO2 SERPL-SCNC: 27 MMOL/L (ref 23–29)
CREAT SERPL-MCNC: 0.9 MG/DL (ref 0.5–1.4)
ERYTHROCYTE [DISTWIDTH] IN BLOOD BY AUTOMATED COUNT: 14.6 % (ref 11.5–14.5)
GFR SERPLBLD CREATININE-BSD FMLA CKD-EPI: >60 ML/MIN/1.73/M2
GLUCOSE SERPL-MCNC: 80 MG/DL (ref 70–110)
HCT VFR BLD AUTO: 36.2 % (ref 40–54)
HGB BLD-MCNC: 11.9 GM/DL (ref 14–18)
IMM GRANULOCYTES # BLD AUTO: 0.02 K/UL (ref 0–0.04)
MCH RBC QN AUTO: 29.8 PG (ref 27–31)
MCHC RBC AUTO-ENTMCNC: 32.9 G/DL (ref 32–36)
MCV RBC AUTO: 91 FL (ref 82–98)
PLATELET # BLD AUTO: 261 K/UL (ref 150–450)
PMV BLD AUTO: 9.5 FL (ref 9.2–12.9)
POTASSIUM SERPL-SCNC: 4 MMOL/L (ref 3.5–5.1)
PROT SERPL-MCNC: 6.9 GM/DL (ref 6–8.4)
PSA SERPL-MCNC: <0.01 NG/ML
RBC # BLD AUTO: 3.99 M/UL (ref 4.6–6.2)
SODIUM SERPL-SCNC: 141 MMOL/L (ref 136–145)
WBC # BLD AUTO: 3.34 K/UL (ref 3.9–12.7)

## 2025-08-11 PROCEDURE — G2211 COMPLEX E/M VISIT ADD ON: HCPCS | Mod: S$GLB,,, | Performed by: NURSE PRACTITIONER

## 2025-08-11 PROCEDURE — 3079F DIAST BP 80-89 MM HG: CPT | Mod: CPTII,S$GLB,, | Performed by: NURSE PRACTITIONER

## 2025-08-11 PROCEDURE — 99999 PR PBB SHADOW E&M-EST. PATIENT-LVL IV: CPT | Mod: PBBFAC,,, | Performed by: NURSE PRACTITIONER

## 2025-08-11 PROCEDURE — 3008F BODY MASS INDEX DOCD: CPT | Mod: CPTII,S$GLB,, | Performed by: NURSE PRACTITIONER

## 2025-08-11 PROCEDURE — 4010F ACE/ARB THERAPY RXD/TAKEN: CPT | Mod: CPTII,S$GLB,, | Performed by: NURSE PRACTITIONER

## 2025-08-11 PROCEDURE — 85027 COMPLETE CBC AUTOMATED: CPT

## 2025-08-11 PROCEDURE — 3044F HG A1C LEVEL LT 7.0%: CPT | Mod: CPTII,S$GLB,, | Performed by: NURSE PRACTITIONER

## 2025-08-11 PROCEDURE — 96402 CHEMO HORMON ANTINEOPL SQ/IM: CPT

## 2025-08-11 PROCEDURE — 36415 COLL VENOUS BLD VENIPUNCTURE: CPT

## 2025-08-11 PROCEDURE — 84153 ASSAY OF PSA TOTAL: CPT

## 2025-08-11 PROCEDURE — 63600175 PHARM REV CODE 636 W HCPCS: Mod: JZ,TB | Performed by: NURSE PRACTITIONER

## 2025-08-11 PROCEDURE — 3077F SYST BP >= 140 MM HG: CPT | Mod: CPTII,S$GLB,, | Performed by: NURSE PRACTITIONER

## 2025-08-11 PROCEDURE — 82040 ASSAY OF SERUM ALBUMIN: CPT

## 2025-08-11 PROCEDURE — 1159F MED LIST DOCD IN RCRD: CPT | Mod: CPTII,S$GLB,, | Performed by: NURSE PRACTITIONER

## 2025-08-11 PROCEDURE — 99215 OFFICE O/P EST HI 40 MIN: CPT | Mod: S$GLB,,, | Performed by: NURSE PRACTITIONER

## 2025-08-11 RX ADMIN — LEUPROLIDE ACETATE 22.5 MG: KIT at 02:08

## 2025-08-19 ENCOUNTER — DOCUMENTATION ONLY (OUTPATIENT)
Dept: RADIATION ONCOLOGY | Facility: CLINIC | Age: 60
End: 2025-08-19
Payer: COMMERCIAL

## 2025-09-05 ENCOUNTER — OFFICE VISIT (OUTPATIENT)
Dept: FAMILY MEDICINE | Facility: CLINIC | Age: 60
End: 2025-09-05
Payer: COMMERCIAL

## 2025-09-05 ENCOUNTER — TELEPHONE (OUTPATIENT)
Dept: FAMILY MEDICINE | Facility: CLINIC | Age: 60
End: 2025-09-05

## 2025-09-05 VITALS
HEIGHT: 74 IN | OXYGEN SATURATION: 97 % | DIASTOLIC BLOOD PRESSURE: 74 MMHG | TEMPERATURE: 98 F | HEART RATE: 67 BPM | SYSTOLIC BLOOD PRESSURE: 114 MMHG | BODY MASS INDEX: 32.96 KG/M2 | WEIGHT: 256.81 LBS

## 2025-09-05 DIAGNOSIS — E53.8 B12 DEFICIENCY: ICD-10-CM

## 2025-09-05 DIAGNOSIS — C61 PROSTATE CANCER: ICD-10-CM

## 2025-09-05 DIAGNOSIS — Z00.00 ROUTINE MEDICAL EXAM: Primary | ICD-10-CM

## 2025-09-05 DIAGNOSIS — T50.905A HOT FLASH DUE TO MEDICATION: ICD-10-CM

## 2025-09-05 DIAGNOSIS — E55.9 VITAMIN D DEFICIENCY DISEASE: ICD-10-CM

## 2025-09-05 DIAGNOSIS — G47.33 OSA (OBSTRUCTIVE SLEEP APNEA): ICD-10-CM

## 2025-09-05 DIAGNOSIS — Z86.0100 HISTORY OF COLONIC POLYPS: ICD-10-CM

## 2025-09-05 DIAGNOSIS — N52.31 ERECTILE DYSFUNCTION AFTER RADICAL PROSTATECTOMY: ICD-10-CM

## 2025-09-05 DIAGNOSIS — G47.09 OTHER INSOMNIA: ICD-10-CM

## 2025-09-05 DIAGNOSIS — L98.9 SKIN LESION: ICD-10-CM

## 2025-09-05 DIAGNOSIS — R23.2 HOT FLASH DUE TO MEDICATION: ICD-10-CM

## 2025-09-05 DIAGNOSIS — E78.5 HYPERLIPIDEMIA, UNSPECIFIED HYPERLIPIDEMIA TYPE: ICD-10-CM

## 2025-09-05 DIAGNOSIS — Z80.0 FAMILY HISTORY OF COLON CANCER: ICD-10-CM

## 2025-09-05 DIAGNOSIS — I10 ESSENTIAL HYPERTENSION: ICD-10-CM

## 2025-09-05 PROCEDURE — 99999 PR PBB SHADOW E&M-EST. PATIENT-LVL IV: CPT | Mod: PBBFAC,,, | Performed by: INTERNAL MEDICINE

## (undated) DEVICE — SUT PROLENE 0 MO6 30IN BLUE

## (undated) DEVICE — DRAPE ARM DAVINCI XI

## (undated) DEVICE — DEVICE ANC SW STAT FOLEY 6-24

## (undated) DEVICE — LINER GLOVE POWDERFREE 8

## (undated) DEVICE — SYR 50CC LL

## (undated) DEVICE — ELECTRODE REM PLYHSV RETURN 9

## (undated) DEVICE — GLOVE BIOGEL SKINSENSE PI 7.5

## (undated) DEVICE — SEE MEDLINE ITEM 156923

## (undated) DEVICE — Device

## (undated) DEVICE — PORT ACCESS 5MM W/120MM

## (undated) DEVICE — SUT 0 VICRYL PLUS CT-1 27IN

## (undated) DEVICE — DRAPE UINDERBUT GRAD PCH

## (undated) DEVICE — TRAY SKIN SCRUB WET PREMIUM

## (undated) DEVICE — TROCAR ENDOPATH XCEL 12X100MM

## (undated) DEVICE — SOL STERILE WATER 10ML

## (undated) DEVICE — JELLY SURGILUBE 5GR

## (undated) DEVICE — TOWEL OR DISP STRL BLUE 4/PK

## (undated) DEVICE — EVACUATOR WOUND BULB 100CC

## (undated) DEVICE — SOL WATER STRL IRR 1000ML

## (undated) DEVICE — CLIP HEMO-LOK MLX LARGE LF

## (undated) DEVICE — TIP YANKAUERS BULB NO VENT

## (undated) DEVICE — ADHESIVE DERMABOND ADVANCED

## (undated) DEVICE — SUT CTD VICRYL 0 UND BR SUT

## (undated) DEVICE — SUT MCRYL PLUS 4-0 PS2 27IN

## (undated) DEVICE — SYR 50ML CATH TIP

## (undated) DEVICE — DRAPE INCISE IOBAN 2 23X17IN

## (undated) DEVICE — GAUZE FLUFF XXLG 36X36 2 PLY

## (undated) DEVICE — SEE L#95700

## (undated) DEVICE — HEMOSTAT SURGICEL 4X8IN

## (undated) DEVICE — SEE MEDLINE ITEM 153151

## (undated) DEVICE — BNDG COFLEX FOAM LF2 ST 4X5YD

## (undated) DEVICE — GAUZE SPONGE PEANUT STRL

## (undated) DEVICE — GLOVE SURG BIOGEL LATEX SZ 7.5

## (undated) DEVICE — SOL CLEARIFY VISUALIZATION LAP

## (undated) DEVICE — CLIPPER BLADE MOD 4406 (CAREF)

## (undated) DEVICE — KIT WING PAD POSITIONING

## (undated) DEVICE — GOWN SURGICAL X-LARGE

## (undated) DEVICE — DRESSING XEROFORM FOIL PK 1X8

## (undated) DEVICE — DRAIN CHANNEL ROUND 19FR

## (undated) DEVICE — LUBRICANT SURGILUBE 2 OZ

## (undated) DEVICE — IRRIGATOR ENDOSCOPY DISP.

## (undated) DEVICE — DRAPE LAP T SHT W/ INSTR PAD

## (undated) DEVICE — SUT MONOCRYL PLUS 2-0 UR-6

## (undated) DEVICE — SEE MEDLINE ITEM 152622

## (undated) DEVICE — BAG TISSUE RETRIEVAL 225ML

## (undated) DEVICE — DRAPE COLUMN DAVINCI XI

## (undated) DEVICE — RETRACTOR LONE STAR 28.3X18.3

## (undated) DEVICE — NDL INSUF ULTRA VERESS 120MM

## (undated) DEVICE — SUT VICRYL+ 27 UR-6 VIOL

## (undated) DEVICE — CONTAINER SPECIMEN STRL 4OZ

## (undated) DEVICE — SEAL UNIVERSAL 5MM-8MM XI

## (undated) DEVICE — DRAPE STERI INSTRUMENT 1018

## (undated) DEVICE — BLADE CLIPPER SENICLIP SURGICA

## (undated) DEVICE — SUT MONOCRYL 2-0 UR6 UNDYED

## (undated) DEVICE — SYR 10CC LUER LOCK

## (undated) DEVICE — SYR IRRIGATION BULB STER 60ML

## (undated) DEVICE — BAG URINARY DRN 2000ML

## (undated) DEVICE — BOWL STERILE LARGE 32OZ

## (undated) DEVICE — TRAY CATH FOL SIL URIMTR 16FR

## (undated) DEVICE — PACK SCROTO-PAK LONE STAR

## (undated) DEVICE — SET DECANTER MEDICHOICE

## (undated) DEVICE — CUP MEDICINE GRADUATED 1OZ

## (undated) DEVICE — SUT MONOCRYL 3-0 PS-2 UND

## (undated) DEVICE — CASSETTE DRAPE

## (undated) DEVICE — SOL NS 1000CC

## (undated) DEVICE — BLADE SURG #15 CARBON STEEL

## (undated) DEVICE — SOL ELECTROLUBE ANTI-STIC

## (undated) DEVICE — PANTIES FEMININE NAPKIN LG/XLG

## (undated) DEVICE — SYR 30CC LUER LOCK

## (undated) DEVICE — FORCEP STRAIGHT DISP

## (undated) DEVICE — SET IRR URLGY 2LINE UNIV SPIKE

## (undated) DEVICE — GAUZE SPONGE 4X4 12PLY

## (undated) DEVICE — SUT 2/0 30IN SILK BLK BRAI

## (undated) DEVICE — NDL SAFETY 21G X 1 1/2 ECLPSE

## (undated) DEVICE — TRAY MINOR GEN SURG OMC

## (undated) DEVICE — SUT CTD VICRYL VIL BR SH 27

## (undated) DEVICE — DRAPE STERI-DRAPE 1000 17X11IN

## (undated) DEVICE — PACK LAPSCP/PELVSCPY III TIBRN

## (undated) DEVICE — SUT PROLENE 0 CT1 30IN BLUE

## (undated) DEVICE — SUT CHROMIC 3-0 SH 27IN GUT

## (undated) DEVICE — RETRACTOR STAY SPIRA  20MM

## (undated) DEVICE — BRIEF MESH LARGE

## (undated) DEVICE — SUT VICRYL 3-0 27 SH

## (undated) DEVICE — SPONGE DERMACEA GAUZE 4X4

## (undated) DEVICE — TAPE MEDIPORE 3 X 10YD

## (undated) DEVICE — SUT 2-0 VICRYL / SH (J417)

## (undated) DEVICE — CORD BIPOLAR 12 FOOT